# Patient Record
Sex: FEMALE | Race: WHITE | NOT HISPANIC OR LATINO | Employment: FULL TIME | URBAN - METROPOLITAN AREA
[De-identification: names, ages, dates, MRNs, and addresses within clinical notes are randomized per-mention and may not be internally consistent; named-entity substitution may affect disease eponyms.]

---

## 2017-01-11 ENCOUNTER — OFFICE VISIT (OUTPATIENT)
Dept: ORTHOPEDICS | Facility: CLINIC | Age: 42
End: 2017-01-11

## 2017-01-11 VITALS — HEIGHT: 70 IN | WEIGHT: 155 LBS | BODY MASS INDEX: 22.19 KG/M2

## 2017-01-11 DIAGNOSIS — M25.512 ACUTE PAIN OF LEFT SHOULDER: Primary | ICD-10-CM

## 2017-01-11 RX ORDER — TRAMADOL HYDROCHLORIDE 50 MG/1
50 TABLET ORAL EVERY 8 HOURS PRN
Qty: 20 TABLET | Refills: 0 | Status: SHIPPED | OUTPATIENT
Start: 2017-01-11 | End: 2017-01-31

## 2017-01-11 NOTE — PROGRESS NOTES
Subjective:   Renetta Lauren is a 41 year old female who is here for left shoulder pain since carrying son late last night felt pull in left shoulder.   Something pulled in left posterior shoulder over past 2 weeks.  3:30 a.m. Walking downstairs.  Hurts to move the arm posterior, burning at rest, took a naproxen this morning.  Helped enough for her to work.  Hurts to move the arm.  Iced this a.m.  Thought it would get better.  Mom is a doctor and thought she might have torn RC.  Son was sick and she was up a lot, children's overnight for breathing problems, 2 weeks later, whooping cough.  Out of day care for 1 month.  Left shoulder has been getting worse.  Pain killers at night.  Writing, feeling the hands are improving.  Worse if she grabs the OJ.  Taking 1 tramadol for a lot of pain.  Several nights, 1/2 tab at 2 a.m.  Wakes up and left shoulder is burning.    Background:   Date of injury: 1/11/17     PAST MEDICAL, SOCIAL, SURGICAL AND FAMILY HISTORY: She  has a past medical history of Depression (ages 18-20); Ulcer (H); UTI (lower urinary tract infection); Asthma; and Miscarriage (Nov 2011).  She  has past surgical history that includes APPENDECTOMY (1991) and Operative hysteroscopy with morcellator (N/A, 11/23/2015).  Her family history includes Coronary Artery Disease in her maternal grandfather; Family History Negative in her father and another family member; Hypertension in her mother; Other Cancer in her mother. There is no history of DIABETES, Hyperlipidemia, CEREBROVASCULAR DISEASE, Breast Cancer, Colon Cancer, Prostate Cancer, Depression, Anxiety Disorder, MENTAL ILLNESS, Substance Abuse, Anesthesia Reaction, Asthma, OSTEOPOROSIS, Genetic Disorder, Thyroid Disease, Obesity, or Unknown/Adopted.  She reports that she has never smoked. She has never used smokeless tobacco. She reports that she drinks alcohol. She reports that she does not use illicit drugs.    ALLERGIES: She is allergic to nkda.    CURRENT  "MEDICATIONS: She has a current medication list which includes the following prescription(s): tramadol, azithromycin, ibuprofen, fluoxetine, acetaminophen-codeine, naproxen sodium, diphenhydramine hcl, and fluticasone.     REVIEW OF SYSTEMS: 10 point review of systems is negative except as noted above.     Exam:   Ht 5' 10\" (1.778 m)  Wt 155 lb (70.308 kg)  BMI 22.24 kg/m2  LMP 11/21/2016           CONSTITUTIONIAL: healthy, alert, no distress and cooperative  HEAD: Normocephalic. No masses, lesions, tenderness or abnormalities  SKIN: no suspicious lesions or rashes  GAIT: normal  NEUROLOGIC: Non-focal, Normal muscle tone and strength, reflexes normal, sensation grossly normal.  PSYCHIATRIC: affect normal/bright and mentation appears normal.    MUSCULOSKELETAL: left shoulder RC    Palpation:  Non-tender SC joint, clavicle, AC joint, acromion, subacromial space, proximal bicep tendon and upper trapezius muscle   Range of Motion        Active:all normal, tenderness with arm going posterior, can do all motions        Passive: all normal, can do all motions, pain worsens if hurry movements  Strength: rotator cuff strength - painful infraspinatus, push off test  Special tests: Negative Neer's test, Negative Lyons, Negative Cross-Arm adduction         Assessment/Plan:   Pt is a 42 yo white female with PMhx of left wrist DeQuervain's tenosynovitis presenting with left shoulder pain  1. Left shoulder- early OA, tender infraspinatus, supraspinatus- PT recommended, MRI ordered  Will call with results  Refill on Tramadol #20 only  RTC 6 weeks  X-RAY INTERPRETATION:   X-Ray of the Left Shoulder: 3-view, ap, y, axillary ordered and interpreted in the office today was positive for IMPRESSION:   1. Early osteophytosis about the glenohumeral joint.  2. Prominent anterior osteophyte about the humeral head neck junction  "

## 2017-01-11 NOTE — MR AVS SNAPSHOT
After Visit Summary   1/11/2017    Renetta Lauren    MRN: [id number]           Patient Information     Date Of Birth          1975        Visit Information        Provider Department      1/11/2017 2:15 PM Orquidea Seaman MD [facility]        Today's Diagnoses     Acute pain of left shoulder    -  1        Follow-ups after your visit        Additional Services     PHYSICAL THERAPY REFERRAL (Internal)       Physical Therapy Referral                  Follow-up notes from your care team     Return in about 6 weeks (around 2/22/2017), or if symptoms worsen or fail to improve.      Future tests that were ordered for you today     Open Future Orders        Priority Expected Expires Ordered    MRI Upper extremity joint w/o contrast lt Routine  1/11/2018 1/11/2017            Who to contact     Please call your clinic at [phone] to:    Ask questions about your health    Make or cancel appointments    Discuss your medicines    Learn about your test results    Speak to your doctor   If you have compliments or concerns about an experience at your clinic, or if you wish to file a complaint, please contact [facility] Patient Relations at [phone] or email us at [email or web address]         Additional Information About Your Visit        MyChart Information     [employer] gives you secure access to your electronic health record. If you see a primary care provider, you can also send messages to your care team and make appointments. If you have questions, please call your primary care clinic.  If you do not have a primary care provider, please call [phone] and they will assist you.      [employer] is an electronic gateway that provides easy, online access to your medical records. With [employer], you can request a clinic appointment, read your test results, renew a prescription or communicate with your care team.     To access your existing  "account, please contact your [facility] or call [phone] for assistance.        Care EveryWhere ID     This is your Care EveryWhere ID. This could be used by other organizations to access your [place] medical records  [id number]        Your Vitals Were     Height BMI (Body Mass Index) Last Period             5' 10\" (1.778 m) 22.24 kg/m2 11/21/2016          Blood Pressure from Last 3 Encounters:   12/15/16 110/76   12/07/16 114/72   10/08/16 117/78    Weight from Last 3 Encounters:   01/11/17 155 lb (70.308 kg)   12/15/16 155 lb 7 oz (70.506 kg)   12/07/16 157 lb (71.215 kg)              We Performed the Following     PHYSICAL THERAPY REFERRAL (Internal)        Primary Care Provider Office Phone # Fax #    Renetta Kumar [phone]       [facility] [address] [address]        Thank you!     Thank you for choosing [facility]  for your care. Our goal is always to provide you with excellent care. Hearing back from our patients is one way we can continue to improve our services. Please take a few minutes to complete the written survey that you may receive in the mail after your visit with us. Thank you!             Your Updated Medication List - Protect others around you: Learn how to safely use, store and throw away your medicines at www.disposemymeds.org.          This list is accurate as of: 1/11/17  3:22 PM.  Always use your most recent med list.                   Brand Name Dispense Instructions for use    acetaminophen-codeine 300-30 MG per tablet    TYLENOL #3    20 tablet    Take 1-2 tablets by mouth Use as needed 1-2 times per month for migraine       azithromycin 250 MG tablet    ZITHROMAX    6 tablet    Two tablets first day, then one tablet daily for four days.       BENADRYL PO          FLUoxetine 10 MG capsule    PROzac    90 capsule    Take 1 capsule (10 mg) by mouth daily       fluticasone 50 " MCG/ACT spray    FLONASE    1 g    Spray 1-2 sprays into both nostrils nightly as needed for rhinitis or allergies       IBUPROFEN PO          NAPROXEN SODIUM PO          traMADol 50 MG tablet    ULTRAM    30 tablet    Take 1 tablet (50 mg) by mouth every 8 hours as needed for moderate pain

## 2017-01-11 NOTE — Clinical Note
1/11/2017      RE: Renetta Lauren  [address]  [place] [address]        Subjective:   Renetta Lauren is a 41 year old female who is here for left shoulder pain since carrying son late last night felt pull in left shoulder.   Something pulled in left posterior shoulder over past 2 weeks.  3:30 a.m. Walking downstairs.  Hurts to move the arm posterior, burning at rest, took a naproxen this morning.  Helped enough for her to work.  Hurts to move the arm.  Iced this a.m.  Thought it would get better.  Mom is a doctor and thought she might have torn RC.  Son was sick and she was up a lot, children's overnight for breathing problems, 2 weeks later, whooping cough.  Out of day care for 1 month.  Left shoulder has been getting worse.  Pain killers at night.  Writing, feeling the hands are improving.  Worse if she grabs the OJ.  Taking 1 tramadol for a lot of pain.  Several nights, 1/2 tab at 2 a.m.  Wakes up and left shoulder is burning.    Background:   Date of injury: 1/11/17     PAST MEDICAL, SOCIAL, SURGICAL AND FAMILY HISTORY: She  has a past medical history of Depression (ages 17-21); Ulcer (H); UTI (lower urinary tract infection); Asthma; and Miscarriage (Nov 2011).  She  has past surgical history that includes APPENDECTOMY (1991) and Operative hysteroscopy with morcellator (N/A, 11/23/2015).  Her family history includes Coronary Artery Disease in her maternal grandfather; Family History Negative in her father and another family member; Hypertension in her mother; Other Cancer in her mother. There is no history of DIABETES, Hyperlipidemia, CEREBROVASCULAR DISEASE, Breast Cancer, Colon Cancer, Prostate Cancer, Depression, Anxiety Disorder, MENTAL ILLNESS, Substance Abuse, Anesthesia Reaction, Asthma, OSTEOPOROSIS, Genetic Disorder, Thyroid Disease, Obesity, or Unknown/Adopted.  She reports that she has never smoked. She has never used smokeless tobacco. She reports that she drinks alcohol. She  "reports that she does not use illicit drugs.    ALLERGIES: She is allergic to nkda.    CURRENT MEDICATIONS: She has a current medication list which includes the following prescription(s): tramadol, azithromycin, ibuprofen, fluoxetine, acetaminophen-codeine, naproxen sodium, diphenhydramine hcl, and fluticasone.     REVIEW OF SYSTEMS: 10 point review of systems is negative except as noted above.     Exam:   Ht 5' 10\" (1.778 m)  Wt 155 lb (70.308 kg)  BMI 22.24 kg/m2  LMP 11/21/2016           CONSTITUTIONIAL: healthy, alert, no distress and cooperative  HEAD: Normocephalic. No masses, lesions, tenderness or abnormalities  SKIN: no suspicious lesions or rashes  GAIT: normal  NEUROLOGIC: Non-focal, Normal muscle tone and strength, reflexes normal, sensation grossly normal.  PSYCHIATRIC: affect normal/bright and mentation appears normal.    MUSCULOSKELETAL: left shoulder RC    Palpation:  Non-tender SC joint, clavicle, AC joint, acromion, subacromial space, proximal bicep tendon and upper trapezius muscle   Range of Motion        Active:all normal, tenderness with arm going posterior, can do all motions        Passive: all normal, can do all motions, pain worsens if hurry movements  Strength: rotator cuff strength - painful infraspinatus, push off test  Special tests: Negative Neer's test, Negative Lyons, Negative Cross-Arm adduction         Assessment/Plan:   Pt is a 40 yo white female with PMhx of left wrist DeQuervain's tenosynovitis presenting with left shoulder pain  1. Left shoulder- early OA, tender infraspinatus, supraspinatus- PT recommended, MRI ordered  Will call with results  Refill on Tramadol #20 only  RTC 6 weeks  X-RAY INTERPRETATION:   X-Ray of the Left Shoulder: 3-view, ap, y, axillary ordered and interpreted in the office today was positive for IMPRESSION:   1. Early osteophytosis about the glenohumeral joint.  2. Prominent anterior osteophyte about the humeral head neck junction    Orquidea" Eliza Seaman MD

## 2017-01-23 DIAGNOSIS — G43.009 MIGRAINE WITHOUT AURA AND WITHOUT STATUS MIGRAINOSUS, NOT INTRACTABLE: ICD-10-CM

## 2017-01-23 NOTE — TELEPHONE ENCOUNTER
SN  Controlled Substance Refill Request for Tylenol #3    Last refill: 11/30/2016 - #20    Last clinic visit: 12/15/2016     Clinic visit frequency required: Not documented  Next appt: No future OV scheduled    Controlled substance agreement on file: Yes:  Date 12/19/2016.    Documentation in problem list reviewed:  Documentation started. Needs to be completed by PCP    Processing:  Fax Rx to [address]    [place] reviewed:  reviewed- recommend provider review. Receives Tramdol from another provider ([facility])  [place]  [email or web address]  Please advise.  Thanks, Devika Canseco RN

## 2017-01-23 NOTE — TELEPHONE ENCOUNTER
acetaminophen-codeine (TYLENOL #3) 300-30 MG per tablet 20 tablet 0 12/15/2016  No      Sig: Take 1-2 tablets by mouth Use as needed 1-2 times per month for migraine     Class: Historical

## 2017-01-23 NOTE — TELEPHONE ENCOUNTER
Reason for Call:  Medication or medication refill:    Do you use a [facility]?  Name of the pharmacy and phone number for the current request:      Name of the medication requested: acetaminophen-codeine (TYLENOL #3) 300-30 MG per tablet    Other request:   Can we leave a detailed message on this number? YES    Phone number patient can be reached at: Home number on file [phone] (home)    Best Time:     Call taken on 1/23/2017 at 11:02 AM by Loreta Bill

## 2017-01-24 ENCOUNTER — THERAPY VISIT (OUTPATIENT)
Dept: PHYSICAL THERAPY | Facility: CLINIC | Age: 42
End: 2017-01-24
Payer: COMMERCIAL

## 2017-01-24 DIAGNOSIS — G89.29 CHRONIC LEFT SHOULDER PAIN: Primary | ICD-10-CM

## 2017-01-24 DIAGNOSIS — M25.512 CHRONIC LEFT SHOULDER PAIN: Primary | ICD-10-CM

## 2017-01-24 PROCEDURE — 97110 THERAPEUTIC EXERCISES: CPT | Mod: GP | Performed by: PHYSICAL THERAPIST

## 2017-01-24 PROCEDURE — 97161 PT EVAL LOW COMPLEX 20 MIN: CPT | Mod: GP | Performed by: PHYSICAL THERAPIST

## 2017-01-24 PROCEDURE — 97112 NEUROMUSCULAR REEDUCATION: CPT | Mod: GP | Performed by: PHYSICAL THERAPIST

## 2017-01-24 NOTE — PROGRESS NOTES
Subjective:    Renetta Lauren is a 41 year old female with a left shoulder condition.  Condition occurred with:  While carrying an object, repetition/overuse and lifting.  Condition occurred: at home.  This is a new and chronic condition  2016 - onset of L shoulder pain from carrying  son. Had previous R shoulder issues from carrying him on the right side, and switched to holding him on the left, which helped the right shoulder but caused the onset of the left shoulder. Then, on 17 - had 1 day of really intenst pain at night. Had been carrying son a lot more when he was sick for the month prior to that. Son is 14 months old.  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     Xray results: IMPRESSION:   1. Early osteophytosis about the glenohumeral joint.  2. Prominent anterior osteophyte about the humeral head neck junction.    Patient reports pain:  Posterior.  Radiates to:  Upper arm (occasionally down forearm into fingers).  Pain is described as aching and burning and is intermittent Pain Scale: 6-7/10 at the end of the day.  Associated with: denies N/T, no loss of motion or strength. Pain is worse in the P.M. (after doing all the activity).  Symptoms are exacerbated by carrying, lifting and other (waking up after lying on left side) and relieved by NSAID's, rest and analgesics.  Since onset symptoms are gradually improving.  Special tests:  X-ray (will have MRI in 3 days).  Previous treatment: none for this.    General health as reported by patient is excellent.                                            Objective:    Standing Alignment:      Shoulder/UE:  Rounded shoulders and superior fossa atrophy L                                       Shoulder Evaluation:  ROM:  AROM:    Flexion:  Left:  WNL    Right:  WNL    Abduction:  Left: WNL   Right:  WNL      External Rotation:  Left:  WNL, pain    Right:  WNL          Flexion/External Rotation:  Left:   T4    Right:  T4  Extension/Internal Rotation:  Left:  T7, pain    Right:  T7          Strength:  : scaption 5/5 bilat. SA: 5/5. Mid Trap: L 4/4, R 4+/5. Low Trap: L 4-/5, R 4/5.  Flexion: Left:5/5   Pain:    Right: 5/5     Pain:     Abduction:  Left: 5/5  Pain:    Right: 5/5     Pain:    Internal Rotation:  Left:5/5     Pain:    Right: 5/5     Pain:  External Rotation:   Left:5-/5     Pain:   Right:5-/5     Pain:                Special Tests:  Special tests assessed shoulder: -Empty can, -belly press, -lift off, -ER lag sign. +Lyons-Toni impingement, +coracoid impingement, -crossover impingement.  Left shoulder positive for the following special tests:  Impingement  Left shoulder negative for the following special tests:  Rotator cuff tear    Palpation:  Palpation assessed shoulder: -TTP at mm belly of supra and infra. TTP at tendon of supraspinatus.    Left shoulder tenderness not present at: Supraspinatus; Infraspinatus or Bicipital Groove                                       General     ROS    Assessment/Plan:      Patient is a 41 year old female with left side shoulder complaints.    Patient has the following significant findings with corresponding treatment plan.                Diagnosis 1:  Left Shoulder pain/RC Tendinopathy    Pain -  manual therapy, self management, education and home program  Decreased strength - therapeutic exercise, therapeutic activities and home program  Decreased function - therapeutic activities and home program  Impaired posture - neuro re-education, therapeutic activities and home program    Therapy Evaluation Codes:   1) History comprised of:   Personal factors that impact the plan of care:      Time since onset of symptoms and living environment of having to pickup and carry son.    Comorbidity factors that impact the plan of care are:      None.     Medications impacting care: None.  2) Examination of Body Systems comprised of:   Body structures and functions that impact  the plan of care:      Shoulder.   Activity limitations that impact the plan of care are:      Lifting and Reading/Computer work.  3) Clinical presentation characteristics are:   Stable/Uncomplicated.  4) Decision-Making    Low complexity using standardized patient assessment instrument and/or measureable assessment of functional outcome.  Cumulative Therapy Evaluation is: Low complexity.    Previous and current functional limitations:  (See Goal Flow Sheet for this information)    Short term and Long term goals: (See Goal Flow Sheet for this information)     Communication ability:  Patient appears to be able to clearly communicate and understand verbal and written communication and follow directions correctly.  Treatment Explanation - The following has been discussed with the patient:   RX ordered/plan of care  Anticipated outcomes  Possible risks and side effects  This patient would benefit from PT intervention to resume normal activities.   Rehab potential is good.    Frequency:  1 X week, once daily  Duration:  for 3 weeks tapering to 2 X a month over 6 weeks  Discharge Plan:  Achieve all LTG.  Independent in home treatment program.  Reach maximal therapeutic benefit.    Please refer to the daily flowsheet for treatment today, total treatment time and time spent performing 1:1 timed codes.

## 2017-01-25 NOTE — PROGRESS NOTES
Subjective:                     and reported as 2/10.                General health as reported by patient is excellent.  Pertinent medical history includes:  Migraines.    Surgical history: Appendectomy.  Current medications:  Pain medication and anti-inflammatory.  Current occupation is Writer.    Primary job tasks include:  Lifting and prolonged sitting (Computer work).                              Objective:    System    Physical Exam    General     ROS    Assessment/Plan:

## 2017-01-31 ENCOUNTER — MYC REFILL (OUTPATIENT)
Dept: ORTHOPEDICS | Facility: CLINIC | Age: 42
End: 2017-01-31

## 2017-01-31 DIAGNOSIS — M25.512 ACUTE PAIN OF LEFT SHOULDER: ICD-10-CM

## 2017-01-31 RX ORDER — TRAMADOL HYDROCHLORIDE 50 MG/1
50 TABLET ORAL EVERY 8 HOURS PRN
Qty: 20 TABLET | Refills: 0 | Status: SHIPPED | OUTPATIENT
Start: 2017-01-31 | End: 2017-02-16

## 2017-01-31 NOTE — TELEPHONE ENCOUNTER
Message from [employer]:  Original authorizing provider: Orquidea Seaman MD    Renetta LYNN Leonidas would like a refill of the following medications:  traMADol (ULTRAM) 50 MG tablet [Orquidea Seaman MD]    Preferred pharmacy: [facility]    Comment:

## 2017-02-08 ENCOUNTER — THERAPY VISIT (OUTPATIENT)
Dept: PHYSICAL THERAPY | Facility: CLINIC | Age: 42
End: 2017-02-08
Payer: COMMERCIAL

## 2017-02-08 DIAGNOSIS — M25.512 CHRONIC LEFT SHOULDER PAIN: Primary | ICD-10-CM

## 2017-02-08 DIAGNOSIS — G89.29 CHRONIC LEFT SHOULDER PAIN: Primary | ICD-10-CM

## 2017-02-08 PROCEDURE — 97110 THERAPEUTIC EXERCISES: CPT | Mod: GP | Performed by: PHYSICAL THERAPIST

## 2017-02-08 PROCEDURE — 97112 NEUROMUSCULAR REEDUCATION: CPT | Mod: GP | Performed by: PHYSICAL THERAPIST

## 2017-02-16 ENCOUNTER — MYC REFILL (OUTPATIENT)
Dept: ORTHOPEDICS | Facility: CLINIC | Age: 42
End: 2017-02-16

## 2017-02-16 DIAGNOSIS — M25.512 ACUTE PAIN OF LEFT SHOULDER: ICD-10-CM

## 2017-02-21 ENCOUNTER — TELEPHONE (OUTPATIENT)
Dept: ORTHOPEDICS | Facility: CLINIC | Age: 42
End: 2017-02-21

## 2017-02-21 RX ORDER — TRAMADOL HYDROCHLORIDE 50 MG/1
50 TABLET ORAL EVERY 8 HOURS PRN
Qty: 20 TABLET | Refills: 0 | Status: SHIPPED | OUTPATIENT
Start: 2017-02-21 | End: 2017-04-12

## 2017-02-21 NOTE — TELEPHONE ENCOUNTER
----- Message from Jeanette Bynum RN sent at 2/21/2017 12:24 PM CST -----  Regarding: Tramadol  Hi,  Pt called triage to say she would like to  her Tramadol Rx from the first floor locked box today.  brittney Reid

## 2017-02-21 NOTE — TELEPHONE ENCOUNTER
Requesting another script for Tramadol, will allow this time but we are not continuing, will need to have discussion with PMD regarding continuation.  If the wrist pain is not improving please make a follow-up appt.

## 2017-02-21 NOTE — TELEPHONE ENCOUNTER
Returned call to patient, to inform her that Dr. Seaman is working out of a different clinic today and that we wouldn't have the script until tomorrow when Dr. Seaman has clinic here at the [facility] [place]. She was okay with this and will  the script tomorrow (2/22/17) from the 1st floor lock box.

## 2017-02-21 NOTE — TELEPHONE ENCOUNTER
Left voice message stating the the tramadol was filled and Dr. Seaman currently has the script. Let patient know that she can come here to pick it up or we can mail it to her address. Also gave her the option letting her know if she is still having wrist pain to come in for a follow up appointment with Dr. Seaman. Gave patient number to contact us regarding those decisions.

## 2017-02-22 ENCOUNTER — TELEPHONE (OUTPATIENT)
Dept: ORTHOPEDICS | Facility: CLINIC | Age: 42
End: 2017-02-22

## 2017-03-01 ENCOUNTER — THERAPY VISIT (OUTPATIENT)
Dept: PHYSICAL THERAPY | Facility: CLINIC | Age: 42
End: 2017-03-01
Payer: COMMERCIAL

## 2017-03-01 DIAGNOSIS — M25.512 CHRONIC LEFT SHOULDER PAIN: ICD-10-CM

## 2017-03-01 DIAGNOSIS — G89.29 CHRONIC LEFT SHOULDER PAIN: ICD-10-CM

## 2017-03-01 PROCEDURE — 97110 THERAPEUTIC EXERCISES: CPT | Mod: GP | Performed by: PHYSICAL THERAPIST

## 2017-03-01 PROCEDURE — 97112 NEUROMUSCULAR REEDUCATION: CPT | Mod: GP | Performed by: PHYSICAL THERAPIST

## 2017-03-15 ENCOUNTER — OFFICE VISIT (OUTPATIENT)
Dept: ORTHOPEDICS | Facility: CLINIC | Age: 42
End: 2017-03-15

## 2017-03-15 VITALS — BODY MASS INDEX: 22.19 KG/M2 | WEIGHT: 155 LBS | HEIGHT: 70 IN

## 2017-03-15 DIAGNOSIS — M25.512 CHRONIC LEFT SHOULDER PAIN: ICD-10-CM

## 2017-03-15 DIAGNOSIS — M65.4 RADIAL STYLOID TENOSYNOVITIS: Primary | ICD-10-CM

## 2017-03-15 DIAGNOSIS — G89.29 CHRONIC LEFT SHOULDER PAIN: ICD-10-CM

## 2017-03-15 RX ORDER — TRAMADOL HYDROCHLORIDE 50 MG/1
50 TABLET ORAL EVERY 8 HOURS PRN
Qty: 30 TABLET | Refills: 0 | Status: SHIPPED | OUTPATIENT
Start: 2017-03-15 | End: 2017-05-25

## 2017-03-15 NOTE — MR AVS SNAPSHOT
After Visit Summary   3/15/2017    Renetta Lauren    MRN: [id number]           Patient Information     Date Of Birth          1975        Visit Information        Provider Department      3/15/2017 11:30 AM Orquidea Seaman MD [facility]        Today's Diagnoses     Radial styloid tenosynovitis    -  1    Chronic left shoulder pain           Follow-ups after your visit        Follow-up notes from your care team     Return in about 6 weeks (around 4/26/2017), or if symptoms worsen or fail to improve.      Your next 10 appointments already scheduled     Mar 23, 2017 12:50 PM CDT   ANA MARIA Extremity with Cat Pierson PT   [facility] [place] ([address] [address]                 Who to contact     Please call your clinic at [phone] to:    Ask questions about your health    Make or cancel appointments    Discuss your medicines    Learn about your test results    Speak to your doctor   If you have compliments or concerns about an experience at your clinic, or if you wish to file a complaint, please contact [facility] Patient Relations at [phone] or email us at [email or web address]         Additional Information About Your Visit        MyChart Information     [employer] gives you secure access to your electronic health record. If you see a primary care provider, you can also send messages to your care team and make appointments. If you have questions, please call your primary care clinic.  If you do not have a primary care provider, please call [phone] and they will assist you.      Hall is an electronic gateway that provides easy, online access to your medical records. With Hall, you can request a clinic appointment, read your test results, renew a prescription or communicate with your care team.     To access your existing account, please  "contact your [facility] or call [phone] for assistance.        Care EveryWhere ID     This is your Care EveryWhere ID. This could be used by other organizations to access your [facility] medical records  [id number]        Your Vitals Were     Height BMI (Body Mass Index)                5' 10\" (1.778 m) 22.24 kg/m2           Blood Pressure from Last 3 Encounters:   12/15/16 110/76   12/07/16 114/72   10/08/16 117/78    Weight from Last 3 Encounters:   03/15/17 155 lb (70.3 kg)   01/11/17 155 lb (70.3 kg)   12/15/16 155 lb 7 oz (70.5 kg)              Today, you had the following     No orders found for display         Today's Medication Changes          These changes are accurate as of: 3/15/17 12:16 PM.  If you have any questions, ask your nurse or doctor.               Stop taking these medicines if you haven't already. Please contact your care team if you have questions.     azithromycin 250 MG tablet   Commonly known as:  ZITHROMAX                Where to get your medicines      Some of these will need a paper prescription and others can be bought over the counter.  Ask your nurse if you have questions.     Bring a paper prescription for each of these medications     traMADol 50 MG tablet                Primary Care Provider Office Phone # Fax #    Renetta Kumar [phone]       [facility] [address] [address]        Thank you!     Thank you for choosing [facility]  for your care. Our goal is always to provide you with excellent care. Hearing back from our patients is one way we can continue to improve our services. Please take a few minutes to complete the written survey that you may receive in the mail after your visit with us. Thank you!             Your Updated Medication List - Protect others around you: Learn how to safely use, store and throw away your medicines at www.disposemymeds.org.        "   This list is accurate as of: 3/15/17 12:16 PM.  Always use your most recent med list.                   Brand Name Dispense Instructions for use    acetaminophen-codeine 300-30 MG per tablet    TYLENOL #3    20 tablet    Use 1-2 tablets daily as needed 1-2 times per month for migraine.  This is a 4 month supply       BENADRYL PO          FLUoxetine 10 MG capsule    PROzac    90 capsule    Take 1 capsule (10 mg) by mouth daily       fluticasone 50 MCG/ACT spray    FLONASE    1 g    Spray 1-2 sprays into both nostrils nightly as needed for rhinitis or allergies       IBUPROFEN PO          NAPROXEN SODIUM PO          * traMADol 50 MG tablet    ULTRAM    20 tablet    Take 1 tablet (50 mg) by mouth every 8 hours as needed for moderate pain       * traMADol 50 MG tablet    ULTRAM    30 tablet    Take 1 tablet (50 mg) by mouth every 8 hours as needed for moderate pain       * Notice:  This list has 2 medication(s) that are the same as other medications prescribed for you. Read the directions carefully, and ask your doctor or other care provider to review them with you.

## 2017-03-15 NOTE — LETTER
3/15/2017      RE: Renetta Lauren  [address]  [address]  [place] [address]        Subjective:   Renetta Lauren is a 41 year old female who is here for left shoulder pain.  Axillary tenderness is improving.  Working with PT.  RC- partial tear.  Worried about being in the scanner for over 5-10 min.  Son with Fever.  Cough that is keeping her up.  Up at night coughing.  Day Care germs.    Wrists have 2-3 days- advil, then 4 nights of daggers in her wrists.  The other night she sliced her fingers.  Daggers pain in left wrist, plate dropped and without thinking tried to catch the plate.  Bone spurs, thinking about elimination diet.  Carrying on occasion.  Now picking up and redirecting.  No wearing the braces, rubbing getting worse.  Left off the braces and they are fine.  Nervous about Tramadol, some dependence, nights with pain doesn't find advil effective.  Has taken for 8-10 days.  No withdrawal symptoms.  Parents are psychologists.  Needed the Tramadol 5 nights a week.  Son is 16 months now.    Background:   Date of injury: 1/11/17     PAST MEDICAL, SOCIAL, SURGICAL AND FAMILY HISTORY: She  has a past medical history of Asthma; Depression (ages 17-21); Miscarriage (Nov 2011); Ulcer (H); and UTI (lower urinary tract infection).  She  has a past surgical history that includes APPENDECTOMY (1991) and Operative hysteroscopy with morcellator (N/A, 11/23/2015).  Her family history includes Coronary Artery Disease in her maternal grandfather; Family History Negative in her father; Hypertension in her mother; Other Cancer in her mother. There is no history of DIABETES, Hyperlipidemia, CEREBROVASCULAR DISEASE, Breast Cancer, Colon Cancer, Prostate Cancer, Depression, Anxiety Disorder, MENTAL ILLNESS, Substance Abuse, Anesthesia Reaction, Asthma, OSTEOPOROSIS, Genetic Disorder, Thyroid Disease, Obesity, or Unknown/Adopted.  She reports that she has never smoked. She has never used smokeless tobacco. She reports  "that she drinks alcohol. She reports that she does not use illicit drugs.    ALLERGIES: She is allergic to nkda [no known drug allergies].    CURRENT MEDICATIONS: She has a current medication list which includes the following prescription(s): tramadol, acetaminophen-codeine, ibuprofen, fluoxetine, naproxen sodium, diphenhydramine hcl, and fluticasone.     REVIEW OF SYSTEMS: 10 point review of systems is negative except as noted above.     Exam:   Ht 5' 10\" (1.778 m)  Wt 155 lb (70.3 kg)  BMI 22.24 kg/m2           CONSTITUTIONIAL: healthy, alert, no distress and cooperative  HEAD: Normocephalic. No masses, lesions, tenderness or abnormalities  SKIN: no suspicious lesions or rashes  GAIT: normal  NEUROLOGIC: Non-focal, Normal muscle tone and strength, reflexes normal, sensation grossly normal.  PSYCHIATRIC: affect normal/bright and mentation appears normal.    MUSCULOSKELETAL: left Dequervain's, improved left shoulder pain with PT    WRIST:  Inspection: swelling - mild 1st wrist dorsal compartment  Palpation: Tender: 1st dorsal compartment  Non-tender: distal radius, distal ulna  Range of Motion: normal  Strength: no deficits  Special tests: negative Tinel's at carpal tunnel.  , negative Phalen's.  , negative Finkelstein's.      ELBOW:  elbow exam not done           Assessment/Plan:   Pt is a 40 yo white female with PMhx of left wrist DeQuervain's tenosynovitis presenting with left shoulder pain  1. Left shoulder- early OA, tender infraspinatus, supraspinatus- PT recommended  And it's improving so she's holding off on MRI  2. Left DeQuervain's- flares off and on, depending on demands for child, bracing as needed    Refill on Tramadol #30 only, has decreased overall need  RTC 6 weeks  X-RAY INTERPRETATION:   X-Ray of the Left Shoulder: 3-view, ap, y, axillary ordered and interpreted in the office today was positive for IMPRESSION:   1. Early osteophytosis about the glenohumeral joint.  2. Prominent anterior osteophyte " about the humeral head neck junction    Orquidea Seaman MD

## 2017-03-15 NOTE — PROGRESS NOTES
Subjective:   Renetta Lauren is a 41 year old female who is here for left shoulder pain.  Axillary tenderness is improving.  Working with PT.  RC- partial tear.  Worried about being in the scanner for over 5-10 min.  Son with Fever.  Cough that is keeping her up.  Up at night coughing.  Day Care germs.    Wrists have 2-3 days- advil, then 4 nights of daggers in her wrists.  The other night she sliced her fingers.  Daggers pain in left wrist, plate dropped and without thinking tried to catch the plate.  Bone spurs, thinking about elimination diet.  Carrying on occasion.  Now picking up and redirecting.  No wearing the braces, rubbing getting worse.  Left off the braces and they are fine.  Nervous about Tramadol, some dependence, nights with pain doesn't find advil effective.  Has taken for 8-10 days.  No withdrawal symptoms.  Parents are psychologists.  Needed the Tramadol 5 nights a week.  Son is 16 months now.    Background:   Date of injury: 1/11/17     PAST MEDICAL, SOCIAL, SURGICAL AND FAMILY HISTORY: She  has a past medical history of Asthma; Depression (ages 17-21); Miscarriage (Nov 2011); Ulcer (H); and UTI (lower urinary tract infection).  She  has a past surgical history that includes APPENDECTOMY (1991) and Operative hysteroscopy with morcellator (N/A, 11/23/2015).  Her family history includes Coronary Artery Disease in her maternal grandfather; Family History Negative in her father; Hypertension in her mother; Other Cancer in her mother. There is no history of DIABETES, Hyperlipidemia, CEREBROVASCULAR DISEASE, Breast Cancer, Colon Cancer, Prostate Cancer, Depression, Anxiety Disorder, MENTAL ILLNESS, Substance Abuse, Anesthesia Reaction, Asthma, OSTEOPOROSIS, Genetic Disorder, Thyroid Disease, Obesity, or Unknown/Adopted.  She reports that she has never smoked. She has never used smokeless tobacco. She reports that she drinks alcohol. She reports that she does not use illicit drugs.    ALLERGIES: She is  "allergic to nkda [no known drug allergies].    CURRENT MEDICATIONS: She has a current medication list which includes the following prescription(s): tramadol, acetaminophen-codeine, ibuprofen, fluoxetine, naproxen sodium, diphenhydramine hcl, and fluticasone.     REVIEW OF SYSTEMS: 10 point review of systems is negative except as noted above.     Exam:   Ht 5' 10\" (1.778 m)  Wt 155 lb (70.3 kg)  BMI 22.24 kg/m2           CONSTITUTIONIAL: healthy, alert, no distress and cooperative  HEAD: Normocephalic. No masses, lesions, tenderness or abnormalities  SKIN: no suspicious lesions or rashes  GAIT: normal  NEUROLOGIC: Non-focal, Normal muscle tone and strength, reflexes normal, sensation grossly normal.  PSYCHIATRIC: affect normal/bright and mentation appears normal.    MUSCULOSKELETAL: left Dequervain's, improved left shoulder pain with PT    WRIST:  Inspection: swelling - mild 1st wrist dorsal compartment  Palpation: Tender: 1st dorsal compartment  Non-tender: distal radius, distal ulna  Range of Motion: normal  Strength: no deficits  Special tests: negative Tinel's at carpal tunnel.  , negative Phalen's.  , negative Finkelstein's.      ELBOW:  elbow exam not done           Assessment/Plan:   Pt is a 40 yo white female with PMhx of left wrist DeQuervain's tenosynovitis presenting with left shoulder pain  1. Left shoulder- early OA, tender infraspinatus, supraspinatus- PT recommended  And it's improving so she's holding off on MRI  2. Left DeQuervain's- flares off and on, depending on demands for child, bracing as needed    Refill on Tramadol #30 only, has decreased overall need  RTC 6 weeks  X-RAY INTERPRETATION:   X-Ray of the Left Shoulder: 3-view, ap, y, axillary ordered and interpreted in the office today was positive for IMPRESSION:   1. Early osteophytosis about the glenohumeral joint.  2. Prominent anterior osteophyte about the humeral head neck junction  "

## 2017-03-27 ENCOUNTER — THERAPY VISIT (OUTPATIENT)
Dept: PHYSICAL THERAPY | Facility: CLINIC | Age: 42
End: 2017-03-27
Payer: COMMERCIAL

## 2017-03-27 DIAGNOSIS — G89.29 CHRONIC LEFT SHOULDER PAIN: ICD-10-CM

## 2017-03-27 DIAGNOSIS — M25.512 CHRONIC LEFT SHOULDER PAIN: ICD-10-CM

## 2017-03-27 PROCEDURE — 97112 NEUROMUSCULAR REEDUCATION: CPT | Mod: GP | Performed by: PHYSICAL THERAPIST

## 2017-03-27 PROCEDURE — 97110 THERAPEUTIC EXERCISES: CPT | Mod: GP | Performed by: PHYSICAL THERAPIST

## 2017-03-27 NOTE — MR AVS SNAPSHOT
After Visit Summary   3/27/2017    Renetta Lauren    MRN: [id number]           Patient Information     Date Of Birth          1975        Visit Information        Provider Department      3/27/2017 1:30 PM Cat Pierson PT [facility]        Today's Diagnoses     Chronic left shoulder pain           Follow-ups after your visit        Who to contact     If you have questions or need follow up information about today's clinic visit or your schedule please contact [facility] directly at No information on file..  Normal or non-critical lab and imaging results will be communicated to you by [employer], letter or phone within 4 business days after the clinic has received the results. If you do not hear from us within 7 days, please contact the clinic through [employer] or phone. If you have a critical or abnormal lab result, we will notify you by phone as soon as possible.  Submit refill requests through [employer] or call your pharmacy and they will forward the refill request to us. Please allow 3 business days for your refill to be completed.          Additional Information About Your Visit        MyChart Information     Z[employer] gives you secure access to your electronic health record. If you see a primary care provider, you can also send messages to your care team and make appointments. If you have questions, please call your primary care clinic.  If you do not have a primary care provider, please call [phone] and they will assist you.        Care EveryWhere ID     This is your Care EveryWhere ID. This could be used by other organizations to access your [place] medical records  [id number]         Blood Pressure from Last 3 Encounters:   12/15/16 110/76   12/07/16 114/72   10/08/16 117/78    Weight from Last 3 Encounters:   03/15/17 70.3 kg (155 lb)   01/11/17 70.3 kg (155 lb)   12/15/16 70.5 kg (155 lb 7 oz)              We Performed the  Following     NEUROMUSCULAR RE-EDUCATION     THERAPEUTIC EXERCISES        Primary Care Provider Office Phone # Fax #    Renetta Kumar [phone]       [facility] [address] [address]        Thank you!     Thank you for choosing [facility] [place]  for your care. Our goal is always to provide you with excellent care. Hearing back from our patients is one way we can continue to improve our services. Please take a few minutes to complete the written survey that you may receive in the mail after your visit with us. Thank you!             Your Updated Medication List - Protect others around you: Learn how to safely use, store and throw away your medicines at www.disposemymeds.org.          This list is accurate as of: 3/27/17  2:16 PM.  Always use your most recent med list.                   Brand Name Dispense Instructions for use    acetaminophen-codeine 300-30 MG per tablet    TYLENOL #3    20 tablet    Use 1-2 tablets daily as needed 1-2 times per month for migraine.  This is a 4 month supply       BENADRYL PO          FLUoxetine 10 MG capsule    PROzac    90 capsule    Take 1 capsule (10 mg) by mouth daily       fluticasone 50 MCG/ACT spray    FLONASE    1 g    Spray 1-2 sprays into both nostrils nightly as needed for rhinitis or allergies       IBUPROFEN PO          NAPROXEN SODIUM PO          * traMADol 50 MG tablet    ULTRAM    20 tablet    Take 1 tablet (50 mg) by mouth every 8 hours as needed for moderate pain       * traMADol 50 MG tablet    ULTRAM    30 tablet    Take 1 tablet (50 mg) by mouth every 8 hours as needed for moderate pain       * Notice:  This list has 2 medication(s) that are the same as other medications prescribed for you. Read the directions carefully, and ask your doctor or other care provider to review them with you.

## 2017-04-04 DIAGNOSIS — G43.009 MIGRAINE WITHOUT AURA AND WITHOUT STATUS MIGRAINOSUS, NOT INTRACTABLE: ICD-10-CM

## 2017-04-04 NOTE — TELEPHONE ENCOUNTER
Reason for call:  Patient reporting a symptom    Symptom or request: Migraine    Duration (how long have symptoms been present): 18 hours or so     Have you been treated for this before? Yes    Additional comments: Renetta LYNN Leonidas would like to speak with a nurse and see if she can get her migraine medication refilled.       Phone Number patient can be reached at:  Home number on file [phone] (home)    Best Time:  ASAP     Can we leave a detailed message on this number:  YES    Call taken on 4/4/2017 at 1:24 PM by Kassie Thompson

## 2017-04-04 NOTE — TELEPHONE ENCOUNTER
AS  Please address due to SN's absence.  Patient saw you 8/2016 for migraine  Patient states she has had a migraine since last evening.  States has vertigo with headache this time  and can not drive.  Has no one to drive her here.     Asking for refill of Tylenol #3.  Was given #20 pills 1/24/17.  Per SN notes (problem list)  #20 should last 4 months   States she ran out of the medication last week and usually will take when she feels migraine coming on.  Also has Tramadol on her medication list.  Last Rx 3/15/17 for #30 from another provider    Please advise.  Thanks, Devika Canseco, RN

## 2017-04-04 NOTE — TELEPHONE ENCOUNTER
AS  Routing back to you for Rx change to #10.  Will put [facility] report on your desk.  Thanks, Devika Canseco RN

## 2017-04-10 NOTE — TELEPHONE ENCOUNTER
Patient informed Rx for #10 will be faxed today.  Verbalizes understanding that she needs an OV for further refills.  Devika Canseco RN

## 2017-04-12 ENCOUNTER — TELEPHONE (OUTPATIENT)
Dept: ORTHOPEDICS | Facility: CLINIC | Age: 42
End: 2017-04-12

## 2017-04-12 DIAGNOSIS — M25.512 ACUTE PAIN OF LEFT SHOULDER: ICD-10-CM

## 2017-04-12 RX ORDER — TRAMADOL HYDROCHLORIDE 50 MG/1
50 TABLET ORAL EVERY 8 HOURS PRN
Qty: 20 TABLET | Refills: 0 | Status: SHIPPED | OUTPATIENT
Start: 2017-04-12 | End: 2017-05-03

## 2017-05-03 ENCOUNTER — TELEPHONE (OUTPATIENT)
Dept: ORTHOPEDICS | Facility: CLINIC | Age: 42
End: 2017-05-03

## 2017-05-03 DIAGNOSIS — M25.512 ACUTE PAIN OF LEFT SHOULDER: ICD-10-CM

## 2017-05-03 RX ORDER — TRAMADOL HYDROCHLORIDE 50 MG/1
50 TABLET ORAL EVERY 8 HOURS PRN
Qty: 20 TABLET | Refills: 0 | Status: SHIPPED | OUTPATIENT
Start: 2017-05-03 | End: 2017-05-17

## 2017-05-03 NOTE — TELEPHONE ENCOUNTER
Left voicemail for patient in regards to her Tramadol prescription request. Script is in Dr. Seaman's box in Sports Medicine clinic. Callback number was left.

## 2017-05-04 NOTE — TELEPHONE ENCOUNTER
Tried to call patient back. No answer so left a voice message stating that we can call the tramadol Rx into the pharmacy at [id number] - [place], [place] - [address] [place] [address] if she prefers. Gave call back number to let us know what her decision is.

## 2017-05-04 NOTE — TELEPHONE ENCOUNTER
----- Message from Khushboo Delaney RN sent at 5/3/2017  5:01 PM CDT -----  Regarding: call back/rx  [phone]  Please call her about her prescription.  She is returning Mattie's call

## 2017-05-08 DIAGNOSIS — G43.109 MIGRAINE WITH AURA AND WITHOUT STATUS MIGRAINOSUS, NOT INTRACTABLE: ICD-10-CM

## 2017-05-08 RX ORDER — FLUOXETINE 10 MG/1
CAPSULE ORAL
Start: 2017-05-08

## 2017-05-08 NOTE — TELEPHONE ENCOUNTER
Prozac      Last Written Prescription Date: 12/15/2016  Last Fill Quantity: 90, # refills: 3  Last Office Visit with G primary care provider:  12/15/2016        Last PHQ-9 score on record=   PHQ-9 SCORE 12/18/2015   Total Score 3

## 2017-05-17 ENCOUNTER — MYC REFILL (OUTPATIENT)
Dept: ORTHOPEDICS | Facility: CLINIC | Age: 42
End: 2017-05-17

## 2017-05-17 ENCOUNTER — MYC MEDICAL ADVICE (OUTPATIENT)
Dept: ORTHOPEDICS | Facility: CLINIC | Age: 42
End: 2017-05-17

## 2017-05-17 DIAGNOSIS — M25.512 ACUTE PAIN OF LEFT SHOULDER: ICD-10-CM

## 2017-05-18 RX ORDER — TRAMADOL HYDROCHLORIDE 50 MG/1
50 TABLET ORAL EVERY 8 HOURS PRN
Qty: 20 TABLET | Refills: 0
Start: 2017-05-18 | End: 2017-06-05

## 2017-05-22 ASSESSMENT — ENCOUNTER SYMPTOMS
MUSCLE WEAKNESS: 1
HEADACHES: 1
STIFFNESS: 1
ARTHRALGIAS: 1

## 2017-05-25 ENCOUNTER — OFFICE VISIT (OUTPATIENT)
Dept: ORTHOPEDICS | Facility: CLINIC | Age: 42
End: 2017-05-25

## 2017-05-25 VITALS
DIASTOLIC BLOOD PRESSURE: 81 MMHG | HEIGHT: 70 IN | WEIGHT: 160.3 LBS | HEART RATE: 52 BPM | RESPIRATION RATE: 18 BRPM | BODY MASS INDEX: 22.95 KG/M2 | SYSTOLIC BLOOD PRESSURE: 126 MMHG | OXYGEN SATURATION: 98 %

## 2017-05-25 DIAGNOSIS — M65.4 RADIAL STYLOID TENOSYNOVITIS OF RIGHT HAND: Primary | ICD-10-CM

## 2017-05-25 DIAGNOSIS — M25.531 RIGHT WRIST PAIN: Primary | ICD-10-CM

## 2017-05-25 NOTE — LETTER
5/25/2017       RE: Renetta Lauren  [address]  [address]  [place] [address]     Dear Colleague,    Thank you for referring your patient, Renetta Lauren, to the [facility] at [facility]. Please see a copy of my visit note below.    SUBJECTIVE: Renetta Lauren is a 41 year old female who reports right wrist pain.  Son on a walk and trying to park the stroller and foot was trapped into a grill grate.  Pt had pain last spring with DeQuervain's.  Has thumb spica brace at home.  Pt reports doesn't think it's fractured.  Irritation  Tramadol used about 1.5 tablets  Experiences migraines only around time of her period.    PAST MEDICAL, SOCIAL, SURGICAL AND FAMILY HISTORY: She  has a past medical history of Asthma; Depression (ages 18-21); Depressive disorder (ages 18-21); Miscarriage (Nov 2011); Ulcer (H); and UTI (lower urinary tract infection).  She  has a past surgical history that includes APPENDECTOMY (1991); Operative hysteroscopy with morcellator (N/A, 11/23/2015); and STOMACH SURGERY PROCEDURE UNLISTED (1990).  Her family history includes CANCER in her mother; Coronary Artery Disease in her maternal grandfather; Family History Negative in her father and another family member; Hypertension in her mother; Other Cancer in her mother. There is no history of DIABETES, Hyperlipidemia, CEREBROVASCULAR DISEASE, Breast Cancer, Colon Cancer, Prostate Cancer, Depression, Anxiety Disorder, MENTAL ILLNESS, Substance Abuse, Anesthesia Reaction, Asthma, OSTEOPOROSIS, Genetic Disorder, Thyroid Disease, Obesity, or Unknown/Adopted.  She reports that she has never smoked. She has never used smokeless tobacco. She reports that she drinks alcohol. She reports that she does not use illicit drugs.    ALLERGIES: She is allergic to nkda [no known drug allergies].    CURRENT MEDICATIONS: She has a current medication list which includes the following prescription(s): tramadol,  "acetaminophen-codeine, ibuprofen, fluoxetine, fluticasone, naproxen sodium, and diphenhydramine hcl.     REVIEW OF SYSTEMS: 10 point review of systems is negative except as noted above.    EXAM:  /81 (BP Location: Right arm, Patient Position: Chair, Cuff Size: Adult Regular)  Pulse 52  Resp 18  Ht 1.778 m (5' 10\")  Wt 72.7 kg (160 lb 4.8 oz)  SpO2 98%  BMI 23 kg/m2  CONSTITUTIONIAL: healthy, alert, no distress and cooperative  HEAD: Normocephalic. No masses, lesions, tenderness or abnormalities  ENT: ENT exam normal, no neck nodes or sinus tenderness  SKIN: no suspicious lesions or rashes  GAIT: normal  Stance: normal  NEUROLOGIC: Normal muscle tone and strength, reflexes normal, sensation grossly normal.  PSYCHIATRIC: affect normal/bright and mentation appears normal.    MUSCULOSKELETAL: right wrist pain  WRIST:  Inspection: no swelling  no effusion  Palpation: Tender: diffusely around wrist, distal radius, 1st dorsal compartment, extensor tendons  Non-tender: distal ulna, TFCC, ECU, scaphoid  Range of Motion: painful  Strength: no deficits  Special tests: negative Tinel's at carpal tunnel, negative Phalen's, positive Finkelstein's.      ELBOW:  elbow exam not done    ASSESSMENT/PLAN:  Pt is a 42 yo white female with PMHx of DeQuervain's presenting with right wrist pain after new FOOSH  1. Right wrist pain- deQuervain's exacerbated, Tramadol script filled previously, using ice and Ibuprofen, encouraged thumb spica brace  Pt met Ave, she'll call hand OT if needs brace adjustments, hand orders placed    RTC 3-4 weeks  X-RAY INTERPRETATION:   X-Ray of the Right Wrist: 3-view, ap, lateral, oblique  ordered and interpreted in the office today was negative for fracture, subluxation or joint space abnormality.    Again, thank you for allowing me to participate in the care of your patient.    Orquidea Seaman MD  "

## 2017-05-25 NOTE — NURSING NOTE
"Reason For Visit:   Chief Complaint   Patient presents with     Musculoskeletal Problem     continued right wrist pain with recent fall last week \"Tuesday or Wednesday\"       Primary MD: Renetta Kumar  Ref. MD: established patient    ?  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  Work status?  Full time.  Date of injury: most recent fall last week  Type of injury: tripped over grill.  Date of surgery: none  Smoker: No      /81 (BP Location: Right arm, Patient Position: Chair, Cuff Size: Adult Regular)  Pulse 52  Resp 18  Ht 1.778 m (5' 10\")  Wt 72.7 kg (160 lb 4.8 oz)  SpO2 98%  BMI 23 kg/m2    Pain Assessment  Patient Currently in Pain: Yes  0-10 Pain Scale: 2  Primary Pain Location: Wrist  Pain Orientation: Right  Pain Descriptors: Tightness, Pressure, Numbness  Alleviating Factors: Ice, Rest, Pain medication  Aggravating Factors: Other (comment) (lifting, use, worse at night)    "

## 2017-05-25 NOTE — MR AVS SNAPSHOT
After Visit Summary   5/25/2017    Renetta Lauren    MRN: [id number]           Patient Information     Date Of Birth          1975        Visit Information        Provider Department      5/25/2017 1:30 PM Orquidea Seaman MD [facility]        Today's Diagnoses     Radial styloid tenosynovitis of right hand    -  1       Follow-ups after your visit        Additional Services     HAND THERAPY       Hand Therapy Referral: meet briefly with Ave                  Follow-up notes from your care team     Return in about 4 weeks (around 6/22/2017), or if symptoms worsen or fail to improve.      Who to contact     Please call your clinic at [phone] to:    Ask questions about your health    Make or cancel appointments    Discuss your medicines    Learn about your test results    Speak to your doctor   If you have compliments or concerns about an experience at your clinic, or if you wish to file a complaint, please contact [facility] Patient Relations at [phone] or email us at [email or web address]         Additional Information About Your Visit        MyChart Information     [employer] gives you secure access to your electronic health record. If you see a primary care provider, you can also send messages to your care team and make appointments. If you have questions, please call your primary care clinic.  If you do not have a primary care provider, please call [phone] and they will assist you.      [employer] is an electronic gateway that provides easy, online access to your medical records. With [employer], you can request a clinic appointment, read your test results, renew a prescription or communicate with your care team.     To access your existing account, please contact your [facility] or call [phone] for assistance.        Care EveryWhere ID     This is your Care EveryWhere ID. This could be  "used by other organizations to access your [facility] medical records  [id number]        Your Vitals Were     Pulse Respirations Height Pulse Oximetry BMI (Body Mass Index)       52 18 1.778 m (5' 10\") 98% 23 kg/m2        Blood Pressure from Last 3 Encounters:   05/25/17 126/81   12/15/16 110/76   12/07/16 114/72    Weight from Last 3 Encounters:   05/25/17 72.7 kg (160 lb 4.8 oz)   03/15/17 70.3 kg (155 lb)   01/11/17 70.3 kg (155 lb)              We Performed the Following     HAND THERAPY        Primary Care Provider Office Phone # Fax #    Renetta Kumar [phone]       [facility] [address] [address]        Thank you!     Thank you for choosing [facility]  for your care. Our goal is always to provide you with excellent care. Hearing back from our patients is one way we can continue to improve our services. Please take a few minutes to complete the written survey that you may receive in the mail after your visit with us. Thank you!             Your Updated Medication List - Protect others around you: Learn how to safely use, store and throw away your medicines at www.disposemymeds.org.          This list is accurate as of: 5/25/17  2:58 PM.  Always use your most recent med list.                   Brand Name Dispense Instructions for use    acetaminophen-codeine 300-30 MG per tablet    TYLENOL #3    10 tablet    Use 1-2 tablets daily as needed 1-2 times per month for migraine.  This is a 4 month supply       BENADRYL PO          FLUoxetine 10 MG capsule    PROzac    90 capsule    Take 1 capsule (10 mg) by mouth daily       fluticasone 50 MCG/ACT spray    FLONASE    1 g    Spray 1-2 sprays into both nostrils nightly as needed for rhinitis or allergies       IBUPROFEN PO          NAPROXEN SODIUM PO          traMADol 50 MG tablet    ULTRAM    20 tablet    Take 1 tablet (50 mg) by mouth every 8 hours as needed for moderate pain         "

## 2017-05-25 NOTE — PROGRESS NOTES
SUBJECTIVE: Renetta Lauren is a 41 year old female who reports right wrist pain.  Son on a walk and trying to park the stroller and foot was trapped into a grill grate.  Pt had pain last spring with DeQuervain's.  Has thumb spica brace at home.  Pt reports doesn't think it's fractured.  Irritation  Tramadol used about 1.5 tablets  Experiences migraines only around time of her period.    PAST MEDICAL, SOCIAL, SURGICAL AND FAMILY HISTORY: She  has a past medical history of Asthma; Depression (ages 18-21); Depressive disorder (ages 18-21); Miscarriage (Nov 2011); Ulcer (H); and UTI (lower urinary tract infection).  She  has a past surgical history that includes APPENDECTOMY (1991); Operative hysteroscopy with morcellator (N/A, 11/23/2015); and STOMACH SURGERY PROCEDURE UNLISTED (1990).  Her family history includes CANCER in her mother; Coronary Artery Disease in her maternal grandfather; Family History Negative in her father and another family member; Hypertension in her mother; Other Cancer in her mother. There is no history of DIABETES, Hyperlipidemia, CEREBROVASCULAR DISEASE, Breast Cancer, Colon Cancer, Prostate Cancer, Depression, Anxiety Disorder, MENTAL ILLNESS, Substance Abuse, Anesthesia Reaction, Asthma, OSTEOPOROSIS, Genetic Disorder, Thyroid Disease, Obesity, or Unknown/Adopted.  She reports that she has never smoked. She has never used smokeless tobacco. She reports that she drinks alcohol. She reports that she does not use illicit drugs.      ALLERGIES: She is allergic to nkda [no known drug allergies].    CURRENT MEDICATIONS: She has a current medication list which includes the following prescription(s): tramadol, acetaminophen-codeine, ibuprofen, fluoxetine, fluticasone, naproxen sodium, and diphenhydramine hcl.     REVIEW OF SYSTEMS: 10 point review of systems is negative except as noted above.    EXAM:  /81 (BP Location: Right arm, Patient Position: Chair, Cuff Size: Adult Regular)  Pulse 52   "Resp 18  Ht 1.778 m (5' 10\")  Wt 72.7 kg (160 lb 4.8 oz)  SpO2 98%  BMI 23 kg/m2  CONSTITUTIONIAL: healthy, alert, no distress and cooperative  HEAD: Normocephalic. No masses, lesions, tenderness or abnormalities  ENT: ENT exam normal, no neck nodes or sinus tenderness  SKIN: no suspicious lesions or rashes  GAIT: normal  Stance: normal  NEUROLOGIC: Normal muscle tone and strength, reflexes normal, sensation grossly normal.  PSYCHIATRIC: affect normal/bright and mentation appears normal.    MUSCULOSKELETAL: right wrist pain  WRIST:  Inspection: no swelling  no effusion  Palpation: Tender: diffusely around wrist, distal radius, 1st dorsal compartment, extensor tendons  Non-tender: distal ulna, TFCC, ECU, scaphoid  Range of Motion: painful  Strength: no deficits  Special tests: negative Tinel's at carpal tunnel, negative Phalen's, positive Finkelstein's.      ELBOW:  elbow exam not done        ASSESSMENT/PLAN:  Pt is a 42 yo white female with PMHx of DeQuervain's presenting with right wrist pain after new FOOSH  1. Right wrist pain- deQuervain's exacerbated, Tramadol script filled previously, using ice and Ibuprofen, encouraged thumb spica brace  Pt met Ave, she'll call hand OT if needs brace adjustments, hand orders placed    RTC 3-4 weeks  X-RAY INTERPRETATION:   X-Ray of the Right Wrist: 3-view, ap, lateral, oblique  ordered and interpreted in the office today was negative for fracture, subluxation or joint space abnormality.  Answers for HPI/ROS submitted by the patient on 5/22/2017   General Symptoms: No  Skin Symptoms: No  HENT Symptoms: No  EYE SYMPTOMS: No  HEART SYMPTOMS: No  LUNG SYMPTOMS: No  INTESTINAL SYMPTOMS: No  URINARY SYMPTOMS: No  GYNECOLOGIC SYMPTOMS: No  BREAST SYMPTOMS: No  SKELETAL SYMPTOMS: Yes  BLOOD SYMPTOMS: No  NERVOUS SYSTEM SYMPTOMS: Yes  MENTAL HEALTH SYMPTOMS: No  Joint pain: Yes  Bone pain: Yes  Muscle weakness: Yes  Joint stiffness: Yes  Bone fracture: No  Headache: Yes    "

## 2017-06-05 DIAGNOSIS — M25.512 ACUTE PAIN OF LEFT SHOULDER: ICD-10-CM

## 2017-06-06 RX ORDER — TRAMADOL HYDROCHLORIDE 50 MG/1
50 TABLET ORAL EVERY 8 HOURS PRN
Qty: 20 TABLET | Refills: 0 | Status: SHIPPED | OUTPATIENT
Start: 2017-06-06 | End: 2017-06-22

## 2017-06-07 NOTE — TELEPHONE ENCOUNTER
Called patient to inform her that her script for Tramadol was ready and she requested that it be called in to her [facility] pharmacy on [facility]. She was informed that a nurse would be calling her to confirm once it's been done. She was okay with this plan.

## 2017-06-16 ENCOUNTER — THERAPY VISIT (OUTPATIENT)
Dept: OCCUPATIONAL THERAPY | Facility: CLINIC | Age: 42
End: 2017-06-16
Payer: COMMERCIAL

## 2017-06-16 DIAGNOSIS — M25.531 RIGHT WRIST PAIN: Primary | ICD-10-CM

## 2017-06-16 DIAGNOSIS — M65.4 RADIAL STYLOID TENOSYNOVITIS OF RIGHT HAND: ICD-10-CM

## 2017-06-16 PROCEDURE — 29125 APPL SHORT ARM SPLINT STATIC: CPT | Mod: GO | Performed by: OCCUPATIONAL THERAPIST

## 2017-06-16 PROCEDURE — 97110 THERAPEUTIC EXERCISES: CPT | Mod: GO | Performed by: OCCUPATIONAL THERAPIST

## 2017-06-16 PROCEDURE — 97165 OT EVAL LOW COMPLEX 30 MIN: CPT | Mod: GO | Performed by: OCCUPATIONAL THERAPIST

## 2017-06-16 NOTE — PROGRESS NOTES
Hand Therapy Initial Evaluation  Current Date:  6/16/2017    Subjective:  Reentta Lauren is a 41 year old right hand dominant female.    Diagnosis:  Right DeQuervain's  DOI:  MD order 5/25/17  Post:  > 1 year since initial onset of symptoms    Patient reports symptoms of pain, stiffness and swelling of the right wrist which occurred due to above diagnosis. Patient reports tripping and exacerbating things recently. Since onset symptoms are unchanged.  Special tests:  Xray.  Previous treatment: hand therapy, cortisone injection.    General health as reported by patient is excellent.  Pertinent medical history includes:  depression (past), migraines/headaches, concussions.  Medical allergies: none.  Surgical history: appendectomy.  Medication history: pain, anti-depressants    Current occupation is Writer/Teacher  Currently working in normal job without restrictions  Job Tasks:  Prolonged sitting, lifting/carrying, computer work  Barriers include:  none  Prior functional level:  independent     Red flags:  none    Additional Occupational Profile Information (patterns of daily living, interests, values and needs): Patient has a 1.5 year old son, has pain with lifting/transferring out of crib and pack n play. Lives alone.    Functional Outcome Measure:   Upper Extremity Functional Index Score:  SCORE:   Column Totals: /80: 65   (A lower score indicates greater disability.)    Objective:    Pain Report:  VAS(0-10) 6/16/17   At Rest: 3/10   With Use: 8/10   Location:  R Radial Wrist  Description:  Stabbing pain, burning at rest  Frequency:  Constant and intermittent    Pain is worse:  During the day with use  Pain is exacerbated by:  Picking up son, opening jars, opening windows  Pain is relieved by:  Rest, ice, splint, tramadol  Progression since onset:  unchanged    Functional Exam:  - no pain, + mild, ++ moderate, +++ severe  ROM:  Wrist 6/16/17 6/16/17   AROM(PROM) Left right   Extension 60 75   Flexion 90 82   RD 15  16   UD 40 35+   UD with th flex 20 25+      and Pinch Strength (pounds)  6/16 Date: 6/16   Left    Side  Right   49        # 1                # 2                # 3         Average 42   13  3 Point  # 1               # 2               # 3        Average 9   13  Lateral  # 1                # 2                # 3         Average 15+     MMT:  Date 6/16/17    Right   Thumb EPL     Thumb EPB     Thumb APL    Radial Deviation     Ulnar Deviation     Wrist Ext    Wrist Flex       Palpation:   6/16/17   Radial Styloid  +   1st dorsal comp.  +     Special Tests:     6/16/17   Finkelstein   ++   Radial Nerve Tinels Neg, however patient reports increased sensory disturbance in dorsal hand a few weeks ago     Appearance: Swelling/nodule noted on radial wrist over 1st DC that is tender to palpation    Assessment:  Patient presents with symptoms consistent with diagnosis of DeQuervains tendonitis, with conservative intervention.   Patient's limitations or Problem List includes:  Pain, Decreased ROM/motion, Weakness, Decreased  and pinch strength and tightness in musculature of the right wrist which interferes with the patient's ability to perform Self Care Tasks (dressing, eating, bathing, hygiene/toileting), Work Tasks, Sleep Patterns, Recreational Activities, Household Chores and Driving  as compared to previous level of function.    Rehab Potential:  Excellent - Return to full activity, no limitations    Patient will benefit from skilled Occupational Therapy to increase wrist and thumb ROM, flexibility,  strength and pinch strength and decrease pain to return to previous activity level and resume normal daily tasks and to reach their rehab potential.    Barriers to Learning:  No barrier    Communication Issues:  Patient appears to be able to clearly communicate and understand verbal and written communication and follow directions correctly.    Assessment of Occupational Performance:  5 or more Performance  Deficits  Identified Performance Deficits: bathing/showering, hygiene and grooming, care of others, child rearing, home establishment and management, meal preparation and cleanup and sleep      Clinical Decision Making (Complexity): Low complexity    Treatment Explanation:  The following has been discussed with the patient:    RX ordered/plan of care  Anticipated outcomes  Possible risks and side effects    Plan:  Frequency:  1 X week, once daily  Duration:  for 6 weeks    Treatment Plan:    Modalities:    US   Therapeutic Exercise:   AROM of wrist and thumb  PROM with stretch to wrist and thumb extensors   Manual Techniques:   Friction massage over 1st dorsal compartment  Myofascial release of the thumb extensors and flexors  Neuromuscular:   Radial nerve gliding   Kinesiotaping  Orthotics:    Forearm based thumb spica orthosis  Self Care:   Ergonomic consideration   Diagnostic education  Discharge Plan:    Achieve all LTG.  Independent in home treatment program.  Reach maximal therapeutic benefit.    Home Program:   Warmth for stiffness  Ice after activity for pain  Self TFM to 1st dorsal compartment  Self MFR to EPB/ABL  AROM of wrist and thumb  FABTSS  Avoid activities that exacerbate pain in the wrist or thumb    Next Visit:  Check splint  US  FM/MFR  Add Dequervnnamdi's stretch and RN glides  K-tape

## 2017-06-16 NOTE — MR AVS SNAPSHOT
After Visit Summary   6/16/2017    Renetta Lauren    MRN: [id number]           Patient Information     Date Of Birth          1975        Visit Information        Provider Department      6/16/2017 1:30 PM Amelia Barksdale OT [facility] Hand Therapy        Today's Diagnoses     Right wrist pain    -  1    Radial styloid tenosynovitis of right hand           Follow-ups after your visit        Who to contact     If you have questions or need follow up information about today's clinic visit or your schedule please contact [facility] directly at [phone].  Normal or non-critical lab and imaging results will be communicated to you by [email or web address], letter or phone within 4 business days after the clinic has received the results. If you do not hear from us within 7 days, please contact the clinic through [employer] or phone. If you have a critical or abnormal lab result, we will notify you by phone as soon as possible.  Submit refill requests through [employer] or call your pharmacy and they will forward the refill request to us. Please allow 3 business days for your refill to be completed.          Additional Information About Your Visit        MyChart Information     [employer] gives you secure access to your electronic health record. If you see a primary care provider, you can also send messages to your care team and make appointments. If you have questions, please call your primary care clinic.  If you do not have a primary care provider, please call [phone] and they will assist you.        Care EveryWhere ID     This is your Care EveryWhere ID. This could be used by other organizations to access your [facility] medical records  [id number]         Blood Pressure from Last 3 Encounters:   05/25/17 126/81   12/15/16 110/76   12/07/16 114/72    Weight from Last 3 Encounters:   05/25/17 72.7 kg (160 lb 4.8 oz)   03/15/17 70.3 kg (155 lb)   01/11/17 70.3 kg (155 lb)              We Performed the  Following     APPLY FOREARM SPLINT,STATIC     OT Eval, Low Complexity (19696)     THERAPEUTIC EXERCISES        Primary Care Provider Office Phone # Fax #    Renetta Kumar [phone]       [facility] [address]   [facility] [address]        Thank you!     Thank you for choosing [facility]  for your care. Our goal is always to provide you with excellent care. Hearing back from our patients is one way we can continue to improve our services. Please take a few minutes to complete the written survey that you may receive in the mail after your visit with us. Thank you!             Your Updated Medication List - Protect others around you: Learn how to safely use, store and throw away your medicines at www.disposemymeds.org.          This list is accurate as of: 6/16/17  3:29 PM.  Always use your most recent med list.                   Brand Name Dispense Instructions for use    acetaminophen-codeine 300-30 MG per tablet    TYLENOL #3    10 tablet    Use 1-2 tablets daily as needed 1-2 times per month for migraine.  This is a 4 month supply       BENADRYL PO          FLUoxetine 10 MG capsule    PROzac    90 capsule    Take 1 capsule (10 mg) by mouth daily       fluticasone 50 MCG/ACT spray    FLONASE    1 g    Spray 1-2 sprays into both nostrils nightly as needed for rhinitis or allergies       IBUPROFEN PO          NAPROXEN SODIUM PO          traMADol 50 MG tablet    ULTRAM    20 tablet    Take 1 tablet (50 mg) by mouth every 8 hours as needed for moderate pain

## 2017-06-22 ENCOUNTER — MYC REFILL (OUTPATIENT)
Dept: ORTHOPEDICS | Facility: CLINIC | Age: 42
End: 2017-06-22

## 2017-06-22 DIAGNOSIS — M25.512 ACUTE PAIN OF LEFT SHOULDER: ICD-10-CM

## 2017-06-22 NOTE — TELEPHONE ENCOUNTER
Message from [employer]:  Original authorizing provider: Orquidea Seaman MD    Renetta LYNNSrinivas Lauren would like a refill of the following medications:  traMADol (ULTRAM) 50 MG tablet [Orquidea Seaman MD]    Preferred pharmacy: [facility]/PH[facility]ACY #[id number] [address]    Comment:  Hi Dr. Seaman, I'm including a note in my refill request, because I know we discussed trying to focus on getting a new brace, icing and ibuprofen--all fine with me, and the new brace is more comfortable. However, although I took a break from meds to make sure that I wasn't having any dependence symptoms (fortunately, all fine on that front) I am still really struggling with pain management in evenings  wrist is visibly more inflamed than it has ever been, and I'm not sure why. Thank you, Renetta

## 2017-06-27 RX ORDER — TRAMADOL HYDROCHLORIDE 50 MG/1
50 TABLET ORAL EVERY 8 HOURS PRN
Qty: 20 TABLET | Refills: 0 | Status: SHIPPED | OUTPATIENT
Start: 2017-06-27 | End: 2017-07-12

## 2017-07-12 ENCOUNTER — MYC REFILL (OUTPATIENT)
Dept: ORTHOPEDICS | Facility: CLINIC | Age: 42
End: 2017-07-12

## 2017-07-12 DIAGNOSIS — M25.512 ACUTE PAIN OF LEFT SHOULDER: ICD-10-CM

## 2017-07-14 PROBLEM — G89.29 CHRONIC LEFT SHOULDER PAIN: Status: RESOLVED | Noted: 2017-01-24 | Resolved: 2017-07-14

## 2017-07-14 PROBLEM — M25.512 CHRONIC LEFT SHOULDER PAIN: Status: RESOLVED | Noted: 2017-01-24 | Resolved: 2017-07-14

## 2017-07-14 NOTE — PROGRESS NOTES
Patient did not return to Physical Therapy to complete their plan of care, thus, full DC status is unknown. Please refer to the SOAP note dated 3/27/17 as well as subjective and objective reports copied below from the last visit for discharge information.   Subjective: Saw Dr. Seaman 2 weeks ago. reports posterior shoulder pain is nicely improved. but her wrists are worsened, and has felt more weakness. Pain is more manageable during the day, and the shoulder is not as bothered with lfting. lifting is more bothersome to wrists.  nights are the worst for the shoulder and when she has the most pain. can even be 7/10 at night and needs pain meds. during the day 2/10 pain is the most  Objective: L scapular winging/increased medial scap border prominence during dynamic hug  Their bout of care ranged from 1/24/17 to 3/27/17. Discharge patient from PT at this time.

## 2017-07-18 NOTE — TELEPHONE ENCOUNTER
Message from [employer]:  Original authorizing provider: MD Renetta Connolly would like a refill of the following medications:  traMADol (ULTRAM) 50 MG tablet [Orquidea Seaman MD]    Preferred pharmacy: [facility]/PHARMACY #[id number] [address]    Comment:  Helkarlene Seaman, I'm going to make an appointment for hand therapy on my right hand, but was wondering if I could also renew the order for therapy on the left as well? (Ultrasound, etc.--that was somewhat helpful at pain relief for a few days.) I have been religiously wearing the RH brace but my left wrist/thumb pain is starting to act up again and both sides continue to wake me up at night with moderate pain so I think I'll try the ultrasound treatments again.

## 2017-07-25 RX ORDER — TRAMADOL HYDROCHLORIDE 50 MG/1
50 TABLET ORAL EVERY 8 HOURS PRN
Qty: 20 TABLET | Refills: 0 | Status: SHIPPED | OUTPATIENT
Start: 2017-07-25 | End: 2017-12-07

## 2017-07-26 ENCOUNTER — TELEPHONE (OUTPATIENT)
Dept: ORTHOPEDICS | Facility: CLINIC | Age: 42
End: 2017-07-26

## 2017-08-10 ENCOUNTER — OFFICE VISIT (OUTPATIENT)
Dept: ORTHOPEDICS | Facility: CLINIC | Age: 42
End: 2017-08-10

## 2017-08-10 VITALS
OXYGEN SATURATION: 99 % | TEMPERATURE: 98.8 F | HEIGHT: 70 IN | SYSTOLIC BLOOD PRESSURE: 123 MMHG | HEART RATE: 67 BPM | BODY MASS INDEX: 22.9 KG/M2 | DIASTOLIC BLOOD PRESSURE: 75 MMHG | WEIGHT: 160 LBS

## 2017-08-10 DIAGNOSIS — M25.531 RIGHT WRIST PAIN: Primary | ICD-10-CM

## 2017-08-10 DIAGNOSIS — M25.532 LEFT WRIST PAIN: ICD-10-CM

## 2017-08-10 RX ORDER — TRAMADOL HYDROCHLORIDE 50 MG/1
50 TABLET ORAL EVERY 6 HOURS PRN
Qty: 30 TABLET | Refills: 3 | Status: SHIPPED | OUTPATIENT
Start: 2017-08-10 | End: 2017-10-25

## 2017-08-10 NOTE — MR AVS SNAPSHOT
After Visit Summary   8/10/2017    Renetta Lauren    MRN: [id number]           Patient Information     Date Of Birth          1975        Visit Information        Provider Department      8/10/2017 1:45 PM Orquidea Seaman MD [facility]        Today's Diagnoses     Right wrist pain    -  1    Left wrist pain           Follow-ups after your visit        Additional Services     HAND THERAPY       Hand Therapy Referral: symptoms of DeQuervain's on left wrist            ORTHOPEDICS ADULT REFERRAL       Your provider has referred you to: PREFERRED PROVIDERS: hand- right wrist DeQuervain's    Please be aware that coverage of these services is subject to the terms and limitations of your health insurance plan.  Call member services at your health plan with any benefit or coverage questions.      Please bring the following to your appointment:    >>   Any x-rays, CTs or MRIs which have been performed.  Contact the facility where they were done to arrange for  prior to your scheduled appointment.    >>   List of current medications   >>   This referral request   >>   Any documents/labs given to you for this referral                  Follow-up notes from your care team     Return in about 4 weeks (around 9/7/2017), or if symptoms worsen or fail to improve.      Your next 10 appointments already scheduled     Aug 16, 2017  9:00 AM CDT   ANA MARIA Hand with Amelia Barksdale OT   [facility] ([facility])    [address] [place] [address] [phone]              Who to contact     Please call your clinic at [phone] to:    Ask questions about your health    Make or cancel appointments    Discuss your medicines    Learn about your test results    Speak to your doctor   If you have compliments or concerns about an experience at your clinic, or if you wish to file a complaint, please contact [facility]  "[place] [facility] Patient Relations at [phone] or email us at [email or web address]         Additional Information About Your Visit        [employer] Information     [employer] gives you secure access to your electronic health record. If you see a primary care provider, you can also send messages to your care team and make appointments. If you have questions, please call your primary care clinic.  If you do not have a primary care provider, please call [phone] and they will assist you.      [employer] is an electronic gateway that provides easy, online access to your medical records. With [employer], you can request a clinic appointment, read your test results, renew a prescription or communicate with your care team.     To access your existing account, please contact your [facility] or call [phone] for assistance.        Care EveryWhere ID     This is your Care EveryWhere ID. This could be used by other organizations to access your [place] medical records  [id number]        Your Vitals Were     Pulse Temperature Height Pulse Oximetry BMI (Body Mass Index)       67 98.8  F (37.1  C) (Oral) 1.778 m (5' 10\") 99% 22.96 kg/m2        Blood Pressure from Last 3 Encounters:   08/10/17 123/75   05/25/17 126/81   12/15/16 110/76    Weight from Last 3 Encounters:   08/10/17 72.6 kg (160 lb)   05/25/17 72.7 kg (160 lb 4.8 oz)   03/15/17 70.3 kg (155 lb)              We Performed the Following     HAND THERAPY     ORTHOPEDICS ADULT REFERRAL          Today's Medication Changes          These changes are accurate as of: 8/10/17  4:13 PM.  If you have any questions, ask your nurse or doctor.               These medicines have changed or have updated prescriptions.        Dose/Directions    * traMADol 50 MG tablet   Commonly known as:  ULTRAM   This may have changed:  Another medication with the same name was added. Make sure you understand how and when to take each.   Used for:  " Acute pain of left shoulder   Changed by:  Orquidea Seaman MD        Dose:  50 mg   Take 1 tablet (50 mg) by mouth every 8 hours as needed for moderate pain   Quantity:  20 tablet   Refills:  0       * traMADol 50 MG tablet   Commonly known as:  ULTRAM   This may have changed:  You were already taking a medication with the same name, and this prescription was added. Make sure you understand how and when to take each.   Used for:  Right wrist pain   Changed by:  Orquidea Seaman MD        Dose:  50 mg   Take 1 tablet (50 mg) by mouth every 6 hours as needed for moderate pain   Quantity:  30 tablet   Refills:  3       * Notice:  This list has 2 medication(s) that are the same as other medications prescribed for you. Read the directions carefully, and ask your doctor or other care provider to review them with you.         Where to get your medicines      Some of these will need a paper prescription and others can be bought over the counter.  Ask your nurse if you have questions.     Bring a paper prescription for each of these medications     traMADol 50 MG tablet                Primary Care Provider Office Phone # Fax #    [place]Mai Kumar [phone] [address] [address]        Equal Access to Services     ANNETTE BACON : Joel Suarez, waperryda moise, qaybta kaalmada adegerardo, erickson martinez. So [facility] [phone].    ATENCIÓN: Si habla español, tiene a george disposición servicios gratuitos de asistencia lingüística. Llame al [phone].    We comply with applicable federal civil rights laws and [place] laws. We do not discriminate on the basis of race, color, national origin, age, disability sex, sexual orientation or gender identity.            Thank you!     Thank you for choosing [facility]  for your care. Our goal is always to provide you with excellent care. Hearing back  from our patients is one way we can continue to improve our services. Please take a few minutes to complete the written survey that you may receive in the mail after your visit with us. Thank you!             Your Updated Medication List - Protect others around you: Learn how to safely use, store and throw away your medicines at www.disposemymeds.org.          This list is accurate as of: 8/10/17  4:13 PM.  Always use your most recent med list.                   Brand Name Dispense Instructions for use Diagnosis    BENADRYL PO           FLUoxetine 10 MG capsule    PROzac    90 capsule    Take 1 capsule (10 mg) by mouth daily    Migraine with aura and without status migrainosus, not intractable       fluticasone 50 MCG/ACT spray    FLONASE    1 g    Spray 1-2 sprays into both nostrils nightly as needed for rhinitis or allergies    Post-nasal drip       IBUPROFEN PO           * traMADol 50 MG tablet    ULTRAM    20 tablet    Take 1 tablet (50 mg) by mouth every 8 hours as needed for moderate pain    Acute pain of left shoulder       * traMADol 50 MG tablet    ULTRAM    30 tablet    Take 1 tablet (50 mg) by mouth every 6 hours as needed for moderate pain    Right wrist pain       * Notice:  This list has 2 medication(s) that are the same as other medications prescribed for you. Read the directions carefully, and ask your doctor or other care provider to review them with you.

## 2017-08-10 NOTE — NURSING NOTE
"Reason For Visit:   Chief Complaint   Patient presents with     RECHECK     Pt. states that she is here today for follow up injury of the right wrist. DOI: 05/2017. She mention that the last 10 days her pain had increase.        Pain Assessment  Patient Currently in Pain: Yes  0-10 Pain Scale: 4  Primary Pain Location: Wrist  Pain Orientation: Right  Pain Descriptors: Discomfort  Alleviating Factors: NSAIDS, Pain medication, Rest  Aggravating Factors: Movement, Reaching, Exercise, Stretching               HEIGHT: 5' 10\", WEIGHT: 160 lbs 0 oz, BMI: Body mass index is 22.96 kg/(m^2).      Current Outpatient Prescriptions   Medication Sig Dispense Refill     traMADol (ULTRAM) 50 MG tablet Take 1 tablet (50 mg) by mouth every 8 hours as needed for moderate pain 20 tablet 0     IBUPROFEN PO        FLUoxetine (PROZAC) 10 MG capsule Take 1 capsule (10 mg) by mouth daily 90 capsule 3     DiphenhydrAMINE HCl (BENADRYL PO)        fluticasone (FLONASE) 50 MCG/ACT nasal spray Spray 1-2 sprays into both nostrils nightly as needed for rhinitis or allergies 1 g 3          Allergies   Allergen Reactions     Nkda [No Known Drug Allergies]                "

## 2017-08-10 NOTE — LETTER
8/10/2017       RE: Renetta Lauren  [address]    [facility] [address]     Dear Colleague,    Thank you for referring your patient, Renetta Lauren, to the [facility] at [facility]. Please see a copy of my visit note below.    SUBJECTIVE: Renetta Lauren is a 42 year old female who reports that her wrist brace melted.  Has an appt on Wed.  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  Sons day care closed, 10 days, carrying son through the woods.  Part of the reason it's flared.  Makes notes on wrist.  10 days without need for Advil.  Ice and 400mg Advil, Tramadol.    Pain is so bad, not getting better.  More hand therapy appts, worried it's not getting better.  Tramadol worn off, taking minimum at night.  Now pain up to flexors, tingling, burning sensation.  Not her fingers, bottom of hand up to right elbow.    Pain during the day, using the brace and tensing the arm more.    Wrist is weak, cake was heavy, right wrist is weak.  Carrying son on left side.  Tumeric BID- midwife suggested it.    PAST MEDICAL, SOCIAL, SURGICAL AND FAMILY HISTORY: She  has a past medical history of Asthma; Depression (ages 18-21); Depressive disorder (ages 18-21); Miscarriage (Nov 2011); Ulcer (H); and UTI (lower urinary tract infection).  She  has a past surgical history that includes APPENDECTOMY (1991); Operative hysteroscopy with morcellator (N/A, 11/23/2015); and STOMACH SURGERY PROCEDURE UNLISTED (1990).  Her family history includes CANCER in her mother; Coronary Artery Disease in her maternal grandfather; Family History Negative in her father and another family member; Hypertension in her mother; Other Cancer in her mother. There is no history of DIABETES, Hyperlipidemia, CEREBROVASCULAR DISEASE, Breast Cancer, Colon Cancer, Prostate Cancer, Depression, Anxiety Disorder, MENTAL ILLNESS, Substance Abuse, Anesthesia Reaction, Asthma, OSTEOPOROSIS,  "Genetic Disorder, Thyroid Disease, Obesity, or Unknown/Adopted.  She reports that she has never smoked. She has never used smokeless tobacco. She reports that she drinks alcohol. She reports that she does not use illicit drugs.    ALLERGIES: She is allergic to nkda [no known drug allergies].    CURRENT MEDICATIONS: She has a current medication list which includes the following prescription(s): tramadol, ibuprofen, fluoxetine, diphenhydramine hcl, and fluticasone.     REVIEW OF SYSTEMS: 10 point review of systems is negative except as noted above.    EXAM:  /75  Pulse 67  Temp 98.8  F (37.1  C) (Oral)  Ht 1.778 m (5' 10\")  Wt 72.6 kg (160 lb)  SpO2 99%  BMI 22.96 kg/m2  CONSTITUTIONIAL: healthy, alert, no distress and cooperative  HEAD: Normocephalic. No masses, lesions, tenderness or abnormalities  ENT: ENT exam normal, no neck nodes or sinus tenderness  SKIN: no suspicious lesions or rashes  GAIT: normal  Stance: normal  NEUROLOGIC: Normal muscle tone and strength, reflexes normal, sensation grossly normal.  PSYCHIATRIC: affect normal/bright and mentation appears normal.    MUSCULOSKELETAL: DeQuervain's  WRIST:  Inspection: no swelling  no effusion  Palpation: Tender: 1st dorsal compartment  Non-tender: distal radius, distal ulna, scaphoid  Range of Motion: normal  Strength: no deficits  Special tests: positive Finkelstein's.      ELBOW:  elbow exam : Inspection: no swelling, no ecchymosis, no olecranon bursa swelling  Tender: no elbow tenderness  Non-tender: common extensor tendon and common flexor tendon  Range of Motion: all normal  Strength: elbow strength full  Special tests: no pain with resisted wrist extension, no pain with resisted wrist flexion:     ASSESSMENT/PLAN:  Pt is a 41 yo white female with PMHx of migraines presenting with bilateral deQuervain's tenosynovitis  1. Right DeQuervain's tenosynovitis- braced, hand therapy, past injection, over 1 year and not improving, wants surgery " opinion  2. Left DeQuervain's symptoms- how the right side started, pt has referral for hand therapy  RTC 4 weeks  X-RAY INTERPRETATION:   None today    Orquidea Seaman MD

## 2017-08-10 NOTE — PROGRESS NOTES
SUBJECTIVE: Renetta Lauren is a 42 year old female who reports that her wrist brace melted.  Has an appt on Wed.  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  Sons day care closed, 10 days, carrying son through the woods.  Part of the reason it's flared.  Makes notes on wrist.  10 days without need for Advil.  Ice and 400mg Advil, Tramadol.    Pain is so bad, not getting better.  More hand therapy appts, worried it's not getting better.  Tramadol worn off, taking minimum at night.  Now pain up to flexors, tingling, burning sensation.  Not her fingers, bottom of hand up to right elbow.    Pain during the day, using the brace and tensing the arm more.    Wrist is weak, cake was heavy, right wrist is weak.  Carrying son on left side.  Tumeric BID- midwife suggested it.    PAST MEDICAL, SOCIAL, SURGICAL AND FAMILY HISTORY: She  has a past medical history of Asthma; Depression (ages 18-21); Depressive disorder (ages 18-21); Miscarriage (Nov 2011); Ulcer (H); and UTI (lower urinary tract infection).  She  has a past surgical history that includes APPENDECTOMY (1991); Operative hysteroscopy with morcellator (N/A, 11/23/2015); and STOMACH SURGERY PROCEDURE UNLISTED (1990).  Her family history includes CANCER in her mother; Coronary Artery Disease in her maternal grandfather; Family History Negative in her father and another family member; Hypertension in her mother; Other Cancer in her mother. There is no history of DIABETES, Hyperlipidemia, CEREBROVASCULAR DISEASE, Breast Cancer, Colon Cancer, Prostate Cancer, Depression, Anxiety Disorder, MENTAL ILLNESS, Substance Abuse, Anesthesia Reaction, Asthma, OSTEOPOROSIS, Genetic Disorder, Thyroid Disease, Obesity, or Unknown/Adopted.  She reports that she has never smoked. She has never used smokeless tobacco. She reports that she drinks alcohol. She reports that she does not use illicit drugs.        ALLERGIES: She is allergic to nkda [no known drug  "allergies].    CURRENT MEDICATIONS: She has a current medication list which includes the following prescription(s): tramadol, ibuprofen, fluoxetine, diphenhydramine hcl, and fluticasone.     REVIEW OF SYSTEMS: 10 point review of systems is negative except as noted above.    EXAM:  /75  Pulse 67  Temp 98.8  F (37.1  C) (Oral)  Ht 1.778 m (5' 10\")  Wt 72.6 kg (160 lb)  SpO2 99%  BMI 22.96 kg/m2  CONSTITUTIONIAL: healthy, alert, no distress and cooperative  HEAD: Normocephalic. No masses, lesions, tenderness or abnormalities  ENT: ENT exam normal, no neck nodes or sinus tenderness  SKIN: no suspicious lesions or rashes  GAIT: normal  Stance: normal  NEUROLOGIC: Normal muscle tone and strength, reflexes normal, sensation grossly normal.  PSYCHIATRIC: affect normal/bright and mentation appears normal.    MUSCULOSKELETAL: DeQuervain's  WRIST:  Inspection: no swelling  no effusion  Palpation: Tender: 1st dorsal compartment  Non-tender: distal radius, distal ulna, scaphoid  Range of Motion: normal  Strength: no deficits  Special tests: positive Finkelstein's.      ELBOW:  elbow exam : Inspection: no swelling, no ecchymosis, no olecranon bursa swelling  Tender: no elbow tenderness  Non-tender: common extensor tendon and common flexor tendon  Range of Motion: all normal  Strength: elbow strength full  Special tests: no pain with resisted wrist extension, no pain with resisted wrist flexion:         ASSESSMENT/PLAN:  Pt is a 41 yo white female with PMHx of migraines presenting with bilateral deQuervain's tenosynovitis  1. Right DeQuervain's tenosynovitis- braced, hand therapy, past injection, over 1 year and not improving, wants surgery opinion  2. Left DeQuervain's symptoms- how the right side started, pt has referral for hand therapy  RTC 4 weeks  X-RAY INTERPRETATION:   None today  Answers for HPI/ROS submitted by the patient on 8/10/2017   General Symptoms: No  Skin Symptoms: No  HENT Symptoms: No  EYE " SYMPTOMS: No  HEART SYMPTOMS: No  LUNG SYMPTOMS: No  INTESTINAL SYMPTOMS: No  URINARY SYMPTOMS: No  GYNECOLOGIC SYMPTOMS: No  BREAST SYMPTOMS: No  SKELETAL SYMPTOMS: No  BLOOD SYMPTOMS: No  NERVOUS SYSTEM SYMPTOMS: No  MENTAL HEALTH SYMPTOMS: No

## 2017-08-16 ENCOUNTER — THERAPY VISIT (OUTPATIENT)
Dept: OCCUPATIONAL THERAPY | Facility: CLINIC | Age: 42
End: 2017-08-16
Payer: COMMERCIAL

## 2017-08-16 DIAGNOSIS — M65.4 RADIAL STYLOID TENOSYNOVITIS OF RIGHT HAND: ICD-10-CM

## 2017-08-16 DIAGNOSIS — M25.531 RIGHT WRIST PAIN: ICD-10-CM

## 2017-08-16 PROCEDURE — 97112 NEUROMUSCULAR REEDUCATION: CPT | Mod: GO | Performed by: OCCUPATIONAL THERAPIST

## 2017-08-16 PROCEDURE — 97035 APP MDLTY 1+ULTRASOUND EA 15: CPT | Mod: GO | Performed by: OCCUPATIONAL THERAPIST

## 2017-08-16 PROCEDURE — 97140 MANUAL THERAPY 1/> REGIONS: CPT | Mod: GO | Performed by: OCCUPATIONAL THERAPIST

## 2017-08-16 NOTE — MR AVS SNAPSHOT
After Visit Summary   8/16/2017    Renetta Lauren    MRN: [id number]           Patient Information     Date Of Birth          1975        Visit Information        Provider Department      8/16/2017 9:00 AM Amelia Barksdale OT [facility]        Today's Diagnoses     Right wrist pain        Radial styloid tenosynovitis of right hand           Follow-ups after your visit        Your next 10 appointments already scheduled     Aug 23, 2017  9:00 AM CDT   ANA MARIA Hand with [facility] ([facility])    [address] [place] [address]  4th [facility] [address] [phone]              Who to contact     If you have questions or need follow up information about today's clinic visit or your schedule please contact [facility] directly at [phone].  Normal or non-critical lab and imaging results will be communicated to you by [employer], letter or phone within 4 business days after the clinic has received the results. If you do not hear from us within 7 days, please contact the clinic through [employer] or phone. If you have a critical or abnormal lab result, we will notify you by phone as soon as possible.  Submit refill requests through [employer] or call your pharmacy and they will forward the refill request to us. Please allow 3 business days for your refill to be completed.          Additional Information About Your Visit        MyChart Information     [employer] gives you secure access to your electronic health record. If you see a primary care provider, you can also send messages to your care team and make appointments. If you have questions, please call your primary care clinic.  If you do not have a primary care provider, please call [phone] and they will assist you.        Care EveryWhere ID     This is your Care EveryWhere ID. This could be used by other organizations to access your [facility]  records  [id number]         Blood Pressure from Last 3 Encounters:   08/10/17 123/75   05/25/17 126/81   12/15/16 110/76    Weight from Last 3 Encounters:   08/10/17 72.6 kg (160 lb)   05/25/17 72.7 kg (160 lb 4.8 oz)   03/15/17 70.3 kg (155 lb)              We Performed the Following     MANUAL THER TECH     NEUROMUSCULAR RE-EDUCATION     ULTRASOUND THERAPY        Primary Care Provider Office Phone # Fax #    Renetta Kumar [phone] [address] [place] [address]        Equal Access to Services     [facility] HospitalSTANTON : Hadii hebert Suarez, aquilino phipps, cinthia laramamaru douglass, erickson morse . So [facility] [phone].    ATENCIÓN: Si habla español, tiene a george disposición servicios gratuitos de asistencia lingüística. Llame al [phone].    We comply with applicable federal civil rights laws and [place] laws. We do not discriminate on the basis of race, color, national origin, age, disability sex, sexual orientation or gender identity.            Thank you!     Thank you for choosing [facility]  for your care. Our goal is always to provide you with excellent care. Hearing back from our patients is one way we can continue to improve our services. Please take a few minutes to complete the written survey that you may receive in the mail after your visit with us. Thank you!             Your Updated Medication List - Protect others around you: Learn how to safely use, store and throw away your medicines at www.disposemymeds.org.          This list is accurate as of: 8/16/17 11:01 AM.  Always use your most recent med list.                   Brand Name Dispense Instructions for use Diagnosis    BENADRYL PO           FLUoxetine 10 MG capsule    PROzac    90 capsule    Take 1 capsule (10 mg) by mouth daily    Migraine with aura and without status migrainosus, not intractable       fluticasone 50 MCG/ACT spray    FLONASE    1 g     Spray 1-2 sprays into both nostrils nightly as needed for rhinitis or allergies    Post-nasal drip       IBUPROFEN PO           * traMADol 50 MG tablet    ULTRAM    20 tablet    Take 1 tablet (50 mg) by mouth every 8 hours as needed for moderate pain    Acute pain of left shoulder       * traMADol 50 MG tablet    ULTRAM    30 tablet    Take 1 tablet (50 mg) by mouth every 6 hours as needed for moderate pain    Right wrist pain       * Notice:  This list has 2 medication(s) that are the same as other medications prescribed for you. Read the directions carefully, and ask your doctor or other care provider to review them with you.

## 2017-08-16 NOTE — PROGRESS NOTES
Hand Therapy Progress Note  Reporting Period is 6/16/2017 through 8/16/2017    Diagnosis:  Right DeQuervain's   DOI:  MD order 5/25/17  Post:  > 1 year since initial onset of symptoms    Initial History:  Patient reports symptoms of pain, stiffness and swelling of the right wrist which occurred due to above diagnosis. Patient reports tripping and exacerbating things recently. Since onset symptoms are unchanged.  Special tests:  Xray.  Previous treatment: hand therapy, cortisone injection.    General health as reported by patient is excellent.  Pertinent medical history includes:  depression (past), migraines/headaches, concussions.  Medical allergies: none.  Surgical history: appendectomy.  Medication history: pain, anti-depressants    Current occupation is Writer/Teacher  Currently working in normal job without restrictions  Job Tasks:  Prolonged sitting, lifting/carrying, computer work  Barriers include:  none  Prior functional level:  independent     Red flags:  none    Additional Occupational Profile Information (patterns of daily living, interests, values and needs): Patient has a 1.5 year old son, has pain with lifting/transferring out of crib and pack n play. Lives alone.    S:  Subjective changes as noted by patient: the wrist has still been really painful. I have a surgery consult scheduled next month just to discuss my options. I left my splint in the car and some of it deformed/changed shape.   Functional changes noted by patient: No Change to Self Care Tasks (dressing, eating, bathing, hygiene/toileting) and Household Chores  Response to previous treatment: good  Patient has noted adverse reaction to: None    Objective:    Pain Report:  VAS(0-10) 6/16/17 8/16/17   At Rest: 3/10 R: 3/10   With Use: 8/10 R:6-7/10   Location:  R Radial Wrist, now also over dorsal wrist  Description:  Aching and tingling, burning with use  Frequency:  Constant and intermittent    Pain is worse:  During the day with use  Pain  is exacerbated by:  Picking up son, opening jars, opening windows  Pain is relieved by:  Rest, ice, splint, tramadol  Progression since onset:  unchanged    Functional Exam:  - no pain, + mild, ++ moderate, +++ severe  ROM:  Wrist 6/16/17 6/16/17 8/16/17   AROM(PROM) Left right right   Extension 60 75 73   Flexion 90 82 85   RD 15 16 15   UD 40 35+ 20++   UD with th flex 20 25+ 20++      and Pinch Strength (pounds)  6/16 Date: 6/16 8/16   Left    Side  Right Right   49        # 1                # 2                # 3         Average 42 54   13  3 Point  # 1               # 2               # 3        Average 9 10   13  Lateral  # 1                # 2                # 3         Average 15+ 13     MMT:  Date 8/16/17    Right   Thumb EPL  ++   Thumb EPB  +   Thumb APL ++   Radial Deviation  +   Ulnar Deviation  +   Wrist Ext +   Wrist Flex  -     Palpation:   6/16/17 8/16/17   Radial Styloid  + R: -   1st dorsal comp.  + R: -     Special Tests:     6/16/17 8/16/17   Finkelstein   ++ R: ++     Radial Nerve Tinels Neg, however patient reports increased sensory disturbance in dorsal hand a few weeks ago R: neg, painful over DRSN with tinels       Appearance: Swelling/nodule noted on radial wrist over 1st DC that is tender to palpation     Assessment:  Response to therapy has been lack of progress in:  Pain:  Ongoing pain in R wrist. Patient reports that splint became deformed in the heat and has not been able to wear it for 2 weeks which has exacerbated pain.   Overall Assessment:  Patient would benefit from continued therapy to achieve rehab potential  STG/LTG:  See goal sheet for details and updates of remaining functional limitations.     Plan:  I have re-evaluated this patient and find that the nature, scope, duration and intensity of the therapy is appropriate for the medical condition of the patient.  Frequency:  1 X week, once daily  Duration:  for 6 additional weeks     Home Program:   Warmth for  stiffness  Ice after activity for pain  Self TFM to 1st dorsal compartment  Self MFR to EPB/ABL  AROM of wrist and thumb  FABTSS  Avoid activities that exacerbate pain in the wrist or thumb  K-tape to Radial wrist  RN glides    Next Visit:  Fix splint  US  FM/MFR  Review RN glides   Add DeQuervnnamdi's stretch  K-tape

## 2017-08-30 ENCOUNTER — THERAPY VISIT (OUTPATIENT)
Dept: OCCUPATIONAL THERAPY | Facility: CLINIC | Age: 42
End: 2017-08-30
Payer: COMMERCIAL

## 2017-08-30 DIAGNOSIS — M65.4 RADIAL STYLOID TENOSYNOVITIS OF RIGHT HAND: ICD-10-CM

## 2017-08-30 DIAGNOSIS — M25.531 RIGHT WRIST PAIN: ICD-10-CM

## 2017-08-30 PROCEDURE — 97110 THERAPEUTIC EXERCISES: CPT | Mod: GO | Performed by: OCCUPATIONAL THERAPIST

## 2017-08-30 PROCEDURE — 97140 MANUAL THERAPY 1/> REGIONS: CPT | Mod: GO | Performed by: OCCUPATIONAL THERAPIST

## 2017-08-30 PROCEDURE — 97112 NEUROMUSCULAR REEDUCATION: CPT | Mod: GO | Performed by: OCCUPATIONAL THERAPIST

## 2017-09-13 ENCOUNTER — THERAPY VISIT (OUTPATIENT)
Dept: OCCUPATIONAL THERAPY | Facility: CLINIC | Age: 42
End: 2017-09-13
Payer: COMMERCIAL

## 2017-09-13 DIAGNOSIS — M25.531 RIGHT WRIST PAIN: ICD-10-CM

## 2017-09-13 DIAGNOSIS — M65.4 RADIAL STYLOID TENOSYNOVITIS OF RIGHT HAND: ICD-10-CM

## 2017-09-13 PROCEDURE — 29125 APPL SHORT ARM SPLINT STATIC: CPT | Mod: GO | Performed by: OCCUPATIONAL THERAPIST

## 2017-09-13 PROCEDURE — 97140 MANUAL THERAPY 1/> REGIONS: CPT | Mod: GO | Performed by: OCCUPATIONAL THERAPIST

## 2017-09-13 PROCEDURE — 97035 APP MDLTY 1+ULTRASOUND EA 15: CPT | Mod: GO | Performed by: OCCUPATIONAL THERAPIST

## 2017-09-25 ENCOUNTER — ALLIED HEALTH/NURSE VISIT (OUTPATIENT)
Dept: NURSING | Facility: CLINIC | Age: 42
End: 2017-09-25
Payer: COMMERCIAL

## 2017-09-25 ENCOUNTER — PRE VISIT (OUTPATIENT)
Dept: ORTHOPEDICS | Facility: CLINIC | Age: 42
End: 2017-09-25

## 2017-09-25 DIAGNOSIS — Z23 NEED FOR PROPHYLACTIC VACCINATION AND INOCULATION AGAINST INFLUENZA: Primary | ICD-10-CM

## 2017-09-25 PROCEDURE — 90686 IIV4 VACC NO PRSV 0.5 ML IM: CPT | Mod: SL

## 2017-09-25 PROCEDURE — 99207 ZZC NO CHARGE NURSE ONLY: CPT

## 2017-09-25 PROCEDURE — 90471 IMMUNIZATION ADMIN: CPT

## 2017-09-25 NOTE — PROGRESS NOTES
Injectable Influenza Immunization Documentation    1.  Is the person to be vaccinated sick today?   No    2. Does the person to be vaccinated have an allergy to a component   of the vaccine?   No    3. Has the person to be vaccinated ever had a serious reaction   to influenza vaccine in the past?   No    4. Has the person to be vaccinated ever had Guillain-Barré syndrome?   No    Form completed by self

## 2017-09-25 NOTE — MR AVS SNAPSHOT
After Visit Summary   9/25/2017    Renetta Lauren    MRN: [id number]           Patient Information     Date Of Birth          1975        Visit Information        Provider Department      9/25/2017 9:55 AM CARE COORDINATOR [facility]        Today's Diagnoses     Need for prophylactic vaccination and inoculation against influenza    -  1       Follow-ups after your visit        Your next 10 appointments already scheduled     Oct 02, 2017 10:30 AM CDT   (Arrive by 10:15 AM)   NEW HAND with Brian Meyers MD   [facility] ([facility])    [address] [place] [address]  4th [facility] [address] [phone]              Who to contact     If you have questions or need follow up information about today's clinic visit or your schedule please contact [facility] directly at [phone].  Normal or non-critical lab and imaging results will be communicated to you by Mobile Multimediahart, letter or phone within 4 business days after the clinic has received the results. If you do not hear from us within 7 days, please contact the clinic through Mobile Multimediahart or phone. If you have a critical or abnormal lab result, we will notify you by phone as soon as possible.  Submit refill requests through Skift or call your pharmacy and they will forward the refill request to us. Please allow 3 business days for your refill to be completed.          Additional Information About Your Visit        MyChart Information     Skift gives you secure access to your electronic health record. If you see a primary care provider, you can also send messages to your care team and make appointments. If you have questions, please call your primary care clinic.  If you do not have a primary care provider, please call [phone] and they will assist you.        Care EveryWhere ID     This is your Care EveryWhere ID. This could be used by  other organizations to access your [place] medical records  [id number]         Blood Pressure from Last 3 Encounters:   08/10/17 123/75   05/25/17 126/81   12/15/16 110/76    Weight from Last 3 Encounters:   08/10/17 160 lb (72.6 kg)   05/25/17 160 lb 4.8 oz (72.7 kg)   03/15/17 155 lb (70.3 kg)              We Performed the Following     FLU VAC, SPLIT VIRUS IM > 3 YO (QUADRIVALENT) [81043]     Vaccine Administration, Initial [76044]        Primary Care Provider Office Phone # Fax #    MarshallMai Kumar [phone] [address] [address]        Equal Access to Services     GAYATHRI BACON : Hadii aad ku hadasho Sominh, waaxda luqadaha, qaybta kaalmada adegerardo, erickson morse . So [facility] [phone].    ATENCIÓN: Si habla español, tiene a george disposición servicios gratuitos de asistencia lingüística. Llame al [phone].    We comply with applicable federal civil rights laws and [place] laws. We do not discriminate on the basis of race, color, national origin, age, disability sex, sexual orientation or gender identity.            Thank you!     Thank you for choosing [facility]  for your care. Our goal is always to provide you with excellent care. Hearing back from our patients is one way we can continue to improve our services. Please take a few minutes to complete the written survey that you may receive in the mail after your visit with us. Thank you!             Your Updated Medication List - Protect others around you: Learn how to safely use, store and throw away your medicines at www.disposemymeds.org.          This list is accurate as of: 9/25/17 10:44 AM.  Always use your most recent med list.                   Brand Name Dispense Instructions for use Diagnosis    BENADRYL PO           FLUoxetine 10 MG capsule    PROzac    90 capsule    Take 1 capsule (10 mg) by mouth daily    Migraine with aura and  without status migrainosus, not intractable       fluticasone 50 MCG/ACT spray    FLONASE    1 g    Spray 1-2 sprays into both nostrils nightly as needed for rhinitis or allergies    Post-nasal drip       IBUPROFEN PO           * traMADol 50 MG tablet    ULTRAM    20 tablet    Take 1 tablet (50 mg) by mouth every 8 hours as needed for moderate pain    Acute pain of left shoulder       * traMADol 50 MG tablet    ULTRAM    30 tablet    Take 1 tablet (50 mg) by mouth every 6 hours as needed for moderate pain    Right wrist pain       * Notice:  This list has 2 medication(s) that are the same as other medications prescribed for you. Read the directions carefully, and ask your doctor or other care provider to review them with you.

## 2017-09-25 NOTE — TELEPHONE ENCOUNTER
1.  Date/reason for appt: 10/2/17- Right wrist injury, discuss surgery     2.  Referring provider: Dr. Seaman - internal referral     3.  Call to patient (Yes / No - short description): No -pt is referred. All records and imaging in Epic/PACS.     4.  Previous care at / records requested from:     [address] [place] [address] [facility] - 5/25/17, 8/10/17     -Xray Wrist 5/25/17 in PACS

## 2017-09-29 DIAGNOSIS — M79.642 BILATERAL HAND PAIN: Primary | ICD-10-CM

## 2017-09-29 DIAGNOSIS — M79.641 BILATERAL HAND PAIN: Primary | ICD-10-CM

## 2017-10-02 ENCOUNTER — OFFICE VISIT (OUTPATIENT)
Dept: ORTHOPEDICS | Facility: CLINIC | Age: 42
End: 2017-10-02

## 2017-10-02 VITALS — WEIGHT: 157.1 LBS | HEIGHT: 70 IN | BODY MASS INDEX: 22.49 KG/M2

## 2017-10-02 DIAGNOSIS — M65.4 RADIAL STYLOID TENOSYNOVITIS: Primary | ICD-10-CM

## 2017-10-02 ASSESSMENT — ENCOUNTER SYMPTOMS
NECK MASS: 0
TASTE DISTURBANCE: 0
SMELL DISTURBANCE: 0
SINUS CONGESTION: 1
SINUS PAIN: 1

## 2017-10-02 NOTE — MR AVS SNAPSHOT
After Visit Summary   10/2/2017    Renetta Lauren    MRN: [id number]           Patient Information     Date Of Birth          1975        Visit Information        Provider Department      10/2/2017 10:00 AM Brian Meyers MD [facility]        Today's Diagnoses     Radial styloid tenosynovitis    -  1       Follow-ups after your visit        Additional Services     HAND THERAPY       Modify thumb spica splint for comfort and to reduce carpal tunnel symptoms.   Hand Therapy Referral                  Who to contact     Please call your clinic at [phone] to:    Ask questions about your health    Make or cancel appointments    Discuss your medicines    Learn about your test results    Speak to your doctor   If you have compliments or concerns about an experience at your clinic, or if you wish to file a complaint, please contact [facility] Patient Relations at [phone] or email us at [email or web address]         Additional Information About Your Visit        MyChart Information     [employer]t gives you secure access to your electronic health record. If you see a primary care provider, you can also send messages to your care team and make appointments. If you have questions, please call your primary care clinic.  If you do not have a primary care provider, please call [phone] and they will assist you.      [employer] is an electronic gateway that provides easy, online access to your medical records. With Satellier, you can request a clinic appointment, read your test results, renew a prescription or communicate with your care team.     To access your existing account, please contact your [facility] or call [phone] for assistance.        Care EveryWhere ID     This is your Care EveryWhere ID. This could be used by other organizations to access your [place] medical records  [id number]        Your  "Vitals Were     Height BMI (Body Mass Index)                1.778 m (5' 10\") 22.54 kg/m2           Blood Pressure from Last 3 Encounters:   08/10/17 123/75   05/25/17 126/81   12/15/16 110/76    Weight from Last 3 Encounters:   10/02/17 71.3 kg (157 lb 1.6 oz)   08/10/17 72.6 kg (160 lb)   05/25/17 72.7 kg (160 lb 4.8 oz)              We Performed the Following     HAND THERAPY        Primary Care Provider Office Phone # Fax #    ForrestonMai Kumar [phone] [address] [place] [address]        Equal Access to Services     GAYATHRI BACON : Joel garciao Sominh, waaxda luqadaha, qaybta kaalmada adeegyada, erickson morse . So [facility] [phone].    ATENCIÓN: Si habla español, tiene a george disposición servicios gratuitos de asistencia lingüística. Laura al [phone].    We comply with applicable federal civil rights laws and [place] laws. We do not discriminate on the basis of race, color, national origin, age, disability, sex, sexual orientation, or gender identity.            Thank you!     Thank you for choosing [facility]  for your care. Our goal is always to provide you with excellent care. Hearing back from our patients is one way we can continue to improve our services. Please take a few minutes to complete the written survey that you may receive in the mail after your visit with us. Thank you!             Your Updated Medication List - Protect others around you: Learn how to safely use, store and throw away your medicines at www.disposemymeds.org.          This list is accurate as of: 10/2/17 11:59 PM.  Always use your most recent med list.                   Brand Name Dispense Instructions for use Diagnosis    BENADRYL PO           FLUoxetine 10 MG capsule    PROzac    90 capsule    Take 1 capsule (10 mg) by mouth daily    Migraine with aura and without status migrainosus, not intractable       fluticasone 50 " MCG/ACT spray    FLONASE    1 g    Spray 1-2 sprays into both nostrils nightly as needed for rhinitis or allergies    Post-nasal drip       IBUPROFEN PO           * traMADol 50 MG tablet    ULTRAM    20 tablet    Take 1 tablet (50 mg) by mouth every 8 hours as needed for moderate pain    Acute pain of left shoulder       * traMADol 50 MG tablet    ULTRAM    30 tablet    Take 1 tablet (50 mg) by mouth every 6 hours as needed for moderate pain    Right wrist pain       * Notice:  This list has 2 medication(s) that are the same as other medications prescribed for you. Read the directions carefully, and ask your doctor or other care provider to review them with you.

## 2017-10-02 NOTE — LETTER
"10/2/2017       RE: Renetta Lauren  [address]    [place] [address]     Dear Colleague,    Thank you for referring your patient, Renetta Lauren, to the [facility] at [facility]. Please see a copy of my visit note below.    Chief complaint: Right hand pain     HPI: Renetta is a 33-year-old female presenting with a chief complaint of right hand pain. This is been going on for about a year and a half. Symptoms began around the time when she had her child. She had a prolonged period of bedrest prior to delivery which feels may have caused her to become weak and susceptible to injury. She works as a . At first, she just noticed pain at night. However her symptoms have progressed and now she has pain throughout the day. It is quite bothersome at work. She has been [facility] at [facility] tor her discomfort, which she finds troublesome. She has done occupational therapy and uses a soft brace for driving. However, she is unable to wear it while typing. She has also tried massage and ice. She had an injection in the wrist 3/25/16 by Dr. Seaman  which she says improved her symptoms about 30% but caused her to have \"atrophy\". At first the pain localized to the first dorsal wrist compartment but now she also has pain up the center of the forearm volarly. She has had intermittent similar sides on the opposite side but the left side is not particularly bothersome to her at the moment. She also reports having numbness and tingling into thumb index and middle fingers that occurs after writing.     A 13 point review of system was performed and is included below.     Past Medical History:   Diagnosis Date     Asthma      Depression ages [age]    estrogen induced from OCP     Depressive disorder ages [age]     Miscarriage Nov 2011    1st trimester     Ulcer (H)      UTI (lower urinary tract infection)        Past Surgical History:   Procedure Laterality " "Date     C APPENDECTOMY  1991     C STOMACH SURGERY PROCEDURE UNLISTED  1990    Appendectomy     OPERATIVE HYSTEROSCOPY WITH MORCELLATOR N/A 11/23/2015    Procedure: OPERATIVE HYSTEROSCOPY WITH MORCELLATOR;  Surgeon: Reyna Marshall MD;  Location: [facility] OR       Current Outpatient Prescriptions   Medication     traMADol (ULTRAM) 50 MG tablet     traMADol (ULTRAM) 50 MG tablet     IBUPROFEN PO     FLUoxetine (PROZAC) 10 MG capsule     DiphenhydrAMINE HCl (BENADRYL PO)     fluticasone (FLONASE) 50 MCG/ACT nasal spray     No current facility-administered medications for this visit.        Allergies   Allergen Reactions     Nkda [No Known Drug Allergies]      Social history:   Works as [employer]. Denies tobacco use. Drinks alcohol occasionally.       Family History   Problem Relation Age of Onset     Family History Negative Other      Hypertension Mother      Other Cancer Mother      CANCER Mother      Family History Negative Father      Coronary Artery Disease Maternal Grandfather      DIABETES No family hx of      Hyperlipidemia No family hx of      CEREBROVASCULAR DISEASE No family hx of      ,     Breast Cancer No family hx of      Colon Cancer No family hx of      Prostate Cancer No family hx of      Depression No family hx of      Anxiety Disorder No family hx of      MENTAL ILLNESS No family hx of      Substance Abuse No family hx of      Anesthesia Reaction No family hx of      Asthma No family hx of      OSTEOPOROSIS No family hx of      Genetic Disorder No family hx of      Thyroid Disease No family hx of      Obesity No family hx of      Unknown/Adopted No family hx of        Physical exam:   Ht 1.778 m (5' 10\")  Wt 71.3 kg (157 lb 1.6 oz)  BMI 22.54 kg/m2  :  R hand  level 1 force: 18.1 kg (40 lb)  R hand  level 3 force: 20.9 kg (46 lb)  R hand  level 5 force: 2.722 kg (6 lb)  L hand   level 1 force: 19.1 kg (42 lb)  L hand  level 3 force: 20.4 kg (45 lb)  L hand  level 5 " force: 16.8 kg (37 lb  Quickdash: 50  Well-developed, well-nourished in no acute distress  [facility].   Right upper extremity:   Skin clean dry and intact  Marked swelling of first dorsal compartment, also present but to a lesser degree contralaterally   Tender over first dorsal compartment  No pain over thumb CMC  No pain with CMC grind  Positive Finkelstein's  Pain with thumb extension vs. Resistance  Negative Tinel's at carpal or cubital tunnel  Negative Phalens  Positive carpal compression test  Negative elbow flexion compression test    X-rays were reviewed from 5/25/17 of the right wrist and are unremarkable.    Assessment: right dequervain's tenosynovitis, possible carpal tunnel syndrome.     Plan: I discussed the diagnosis with Renetta. Her Adrian's symptoms are quite severe. Treatment options include, splinting, injecting, therapy and surgery. She is very averse to any sort of surgery or repeat injection at this point. Therefore I think it is reasonable to attempt another course of splinting. She has a thumb spica splint but has worn infrequently because she finds it uncomfortable. She also finds it makes her numbness and tingling worse. Therefore I'd like her to go back to therapy to get the splint modified to hopefully make it more comfortable for her. I would recommend her wearing it 23 hours a day.  I would also like her to get an EMG to evaluate her numbness and tingling. I will see her back in 2 months to see how she is doing.     Brian Meyers MD

## 2017-10-02 NOTE — PROGRESS NOTES
"Chief complaint: Right hand pain     HPI: Renetta is a 33-year-old female presenting with a chief complaint of right hand pain. This is been going on for about a year and a half. Symptoms began around the time when she had her child. She had a prolonged period of bedrest prior to delivery which feels may have caused her to become weak and susceptible to injury. She works as a . At first, she just noticed pain at night. However her symptoms have progressed and now she has pain throughout the day. It is quite bothersome at work. She has been taking tramadol at [facility] tor her discomfort, which she finds troublesome. She has done occupational therapy and uses a soft brace for driving. However, she is unable to wear it while typing. She has also tried massage and ice. She had an injection in the wrist 3/25/16 by Dr. Seaman  which she says improved her symptoms about 30% but caused her to have \"atrophy\". At first the pain localized to the first dorsal wrist compartment but now she also has pain up the center of the forearm volarly. She has had intermittent similar sides on the opposite side but the left side is not particularly bothersome to her at the moment. She also reports having numbness and tingling into thumb index and middle fingers that occurs after writing.     A 13 point review of system was performed and is included below.     Past Medical History:   Diagnosis Date     Asthma      Depression ages [age]    estrogen induced from OCP     Depressive disorder ages [age]     Miscarriage Nov 2011    1st trimester     Ulcer (H)      UTI (lower urinary tract infection)        Past Surgical History:   Procedure Laterality Date     C APPENDECTOMY  1991     C STOMACH SURGERY PROCEDURE UNLISTED  1990    Appendectomy     OPERATIVE HYSTEROSCOPY WITH MORCELLATOR N/A 11/23/2015    Procedure: OPERATIVE HYSTEROSCOPY WITH MORCELLATOR;  Surgeon: Reyna Marshall MD;  Location: [facility] OR       Current Outpatient " "Prescriptions   Medication     traMADol (ULTRAM) 50 MG tablet     traMADol (ULTRAM) 50 MG tablet     IBUPROFEN PO     FLUoxetine (PROZAC) 10 MG capsule     DiphenhydrAMINE HCl (BENADRYL PO)     fluticasone (FLONASE) 50 MCG/ACT nasal spray     No current facility-administered medications for this visit.        Allergies   Allergen Reactions     Nkda [No Known Drug Allergies]      Social history:   Works as . Denies tobacco use. Drinks alcohol occasionally.       Family History   Problem Relation Age of Onset     Family History Negative Other      Hypertension Mother      Other Cancer Mother      CANCER Mother      Family History Negative Father      Coronary Artery Disease Maternal Grandfather      DIABETES No family hx of      Hyperlipidemia No family hx of      CEREBROVASCULAR DISEASE No family hx of      ,     Breast Cancer No family hx of      Colon Cancer No family hx of      Prostate Cancer No family hx of      Depression No family hx of      Anxiety Disorder No family hx of      MENTAL ILLNESS No family hx of      Substance Abuse No family hx of      Anesthesia Reaction No family hx of      Asthma No family hx of      OSTEOPOROSIS No family hx of      Genetic Disorder No family hx of      Thyroid Disease No family hx of      Obesity No family hx of      Unknown/Adopted No family hx of        Physical exam:   Ht 1.778 m (5' 10\")  Wt 71.3 kg (157 lb 1.6 oz)  BMI 22.54 kg/m2  :  R hand  level 1 force: 18.1 kg (40 lb)  R hand  level 3 force: 20.9 kg (46 lb)  R hand  level 5 force: 2.722 kg (6 lb)  L hand   level 1 force: 19.1 kg (42 lb)  L hand  level 3 force: 20.4 kg (45 lb)  L hand  level 5 force: 16.8 kg (37 lb  Quickdash: 50  Well-developed, well-nourished in no acute distress  Alert and oriented to surroudnings.   Right upper extremity:   Skin clean dry and intact  Marked swelling of first dorsal compartment, also present but to a lesser degree contralaterally   Tender " over first dorsal compartment  No pain over thumb CMC  No pain with CMC grind  Positive Finkelstein's  Pain with thumb extension vs. Resistance  Negative Tinel's at carpal or cubital tunnel  Negative Phalens  Positive carpal compression test  Negative elbow flexion compression test    X-rays were reviewed from 5/25/17 of the right wrist and are unremarkable.    Assessment: right dequervain's tenosynovitis, possible carpal tunnel syndrome.     Plan: I discussed the diagnosis with Renetta. Her DeQuervain's symptoms are quite severe. Treatment options include, splinting, injecting, therapy and surgery. She is very averse to any sort of surgery or repeat injection at this point. Therefore I think it is reasonable to attempt another course of splinting. She has a thumb spica splint but has worn infrequently because she finds it uncomfortable. She also finds it makes her numbness and tingling worse. Therefore I'd like her to go back to therapy to get the splint modified to hopefully make it more comfortable for her. I would recommend her wearing it 23 hours a day.  I would also like her to get an EMG to evaluate her numbness and tingling. I will see her back in 2 months to see how she is doing.     Answers for HPI/ROS submitted by the patient on 10/2/2017   General Symptoms: No  Skin Symptoms: No  HENT Symptoms: Yes  EYE SYMPTOMS: No  HEART SYMPTOMS: No  LUNG SYMPTOMS: No  INTESTINAL SYMPTOMS: No  URINARY SYMPTOMS: No  GYNECOLOGIC SYMPTOMS: No  BREAST SYMPTOMS: No  SKELETAL SYMPTOMS: No  BLOOD SYMPTOMS: No  NERVOUS SYSTEM SYMPTOMS: No  MENTAL HEALTH SYMPTOMS: No  Congestion: Yes  Sinus pain: Yes  Tooth pain: No  Gum tenderness: No  Bleeding gums: No  Change in taste: No  Change in sense of smell: No  Dry mouth: No  Hearing aid used: No  Neck lump: No

## 2017-10-02 NOTE — NURSING NOTE
"Reason For Visit:   Chief Complaint   Patient presents with     Consult     Pt. states that she is here today for Right Wrist Pain and Numbness. She mention that she is currently wearing a Wrist Brace and attending Physical Therapy once a month.        Primary MD: Renetta Kumar      Age: 42 year old      Ht 1.778 m (5' 10\")  Wt 71.3 kg (157 lb 1.6 oz)  BMI 22.54 kg/m2      Pain Assessment  Patient Currently in Pain: Yes  0-10 Pain Scale: 3  Primary Pain Location: Wrist  Pain Orientation: Right  Pain Descriptors: Discomfort, Aching, Numbness  Alleviating Factors: Rest, NSAIDS, Pain medication  Aggravating Factors: Movement, Bending    Hand Dominance Evaluation  Hand Dominance: Right      force  R hand  level 1 force: 18.1 kg (40 lb)  R hand  level 3 force: 20.9 kg (46 lb)  R hand  level 5 force: 2.722 kg (6 lb)  L hand   level 1 force: 19.1 kg (42 lb)  L hand  level 3 force: 20.4 kg (45 lb)  L hand  level 5 force: 16.8 kg (37 lb)    QuickDASH Assessment  QuickDASH Main 10/2/2017   1.Open a tight or new jar. Severe difficulty   2. Do heavy household chores (e.g., wash walls, floors) Moderate difficulty   3. Carry a shopping bag or briefcase. Mild difficulty   4. Wash your back. No difficulty   5. Use a knife to cut food. Mild difficulty   6. Recreational activities in which you take some force or impact through your arm, shoulder or hand (e.g., golf, hammering, tennis, etc.). Unable   7. During the past week, to what extent has your arm, shoulder or hand problem interfered with your normal social activities with family, friends, neighbours or groups? Quite a bit   8. During the past week, were you limited in your work or other regular daily activities as a result of your arm, shoulder or hand problem? Moderately limited   9. Arm, shoulder or hand pain. Moderate   10.Tingling (pins and needles) in your arm,shoulder or hand. Moderate   11. During the past week, how much difficulty " have you had sleeping because of the pain in your arm, shoulder or hand? ([place] number) Moderate difficulty   Quickdash Ability Score 50   1. Open a tight or new jar. -   2. Do heavy household chores (e.g., wash walls, floors). -   3. Carry a shopping bag or briefcase. -   4. Wash your back. -   5. Use a knife to cut food. -   6. Recreational activities in which you take some force or impact through your arm, shoulder or hand (e.g., golf, hammering, tennis, etc.). -   7. During the past week, to what extent has your arm, shoulder or hand problem interfered with your normal social activities with family, friends, neighbours or groups? -   8. During the past week, were you limited in your work or other regular daily activities as a result of your arm, shoulder or hand problem? -   9. Arm, shoulder or hand pain. -   10. Tingling (pins and needles) in your arm,shoulder or hand. -   11. During the past week, how much difficulty have you had sleeping because of the pain in your arm, shoulder or hand? ([place] number) -   SUM: -          Current Outpatient Prescriptions   Medication Sig Dispense Refill     traMADol (ULTRAM) 50 MG tablet Take 1 tablet (50 mg) by mouth every 6 hours as needed for moderate pain 30 tablet 3     traMADol (ULTRAM) 50 MG tablet Take 1 tablet (50 mg) by mouth every 8 hours as needed for moderate pain 20 tablet 0     IBUPROFEN PO        FLUoxetine (PROZAC) 10 MG capsule Take 1 capsule (10 mg) by mouth daily 90 capsule 3     DiphenhydrAMINE HCl (BENADRYL PO)        fluticasone (FLONASE) 50 MCG/ACT nasal spray Spray 1-2 sprays into both nostrils nightly as needed for rhinitis or allergies 1 g 3       Allergies   Allergen Reactions     Nkda [No Known Drug Allergies]        Olena Betancourt MA

## 2017-10-25 ENCOUNTER — MYC REFILL (OUTPATIENT)
Dept: ORTHOPEDICS | Facility: CLINIC | Age: 42
End: 2017-10-25

## 2017-10-25 ENCOUNTER — TELEPHONE (OUTPATIENT)
Dept: ORTHOPEDICS | Facility: CLINIC | Age: 42
End: 2017-10-25

## 2017-10-25 DIAGNOSIS — M25.531 RIGHT WRIST PAIN: ICD-10-CM

## 2017-10-25 RX ORDER — TRAMADOL HYDROCHLORIDE 50 MG/1
50 TABLET ORAL EVERY 6 HOURS PRN
Qty: 30 TABLET | Refills: 3 | Status: SHIPPED | OUTPATIENT
Start: 2017-10-25 | End: 2018-01-23

## 2017-10-25 NOTE — TELEPHONE ENCOUNTER
Message from [place]:  Rhiannon Morales LPN Wed Oct 25, 2017 2:58 PM        ----- Message -----   From: Renetta Lauren   Sent: 10/25/2017 2:44 PM   To: Sports Med Triage-[facility]  Subject: Medication Renewal Request     Original authorizing provider: MD Renetta Connolly would like a refill of the following medications:  traMADol (ULTRAM) 50 MG tablet [Orquidea Seaman MD]    Preferred pharmacy: [facility]/PHARMACY #[id number] [address]    Comment:

## 2017-10-26 NOTE — TELEPHONE ENCOUNTER
Talked to patient about the tramadol prescription. She requested to pick it up at the clinic. She stated that she would get it on 10/27/17. All questions were answered and Rx was put on 1st floor.

## 2017-12-07 ENCOUNTER — OFFICE VISIT (OUTPATIENT)
Dept: FAMILY MEDICINE | Facility: CLINIC | Age: 42
End: 2017-12-07
Payer: MEDICAID

## 2017-12-07 VITALS
OXYGEN SATURATION: 99 % | SYSTOLIC BLOOD PRESSURE: 98 MMHG | TEMPERATURE: 99 F | WEIGHT: 156.4 LBS | BODY MASS INDEX: 22.39 KG/M2 | HEART RATE: 95 BPM | HEIGHT: 70 IN | DIASTOLIC BLOOD PRESSURE: 64 MMHG

## 2017-12-07 DIAGNOSIS — J01.90 ACUTE SINUSITIS WITH SYMPTOMS > 10 DAYS: Primary | ICD-10-CM

## 2017-12-07 PROCEDURE — 99214 OFFICE O/P EST MOD 30 MIN: CPT | Performed by: PREVENTIVE MEDICINE

## 2017-12-07 RX ORDER — CODEINE PHOSPHATE AND GUAIFENESIN 10; 100 MG/5ML; MG/5ML
1-2 SOLUTION ORAL EVERY 4 HOURS PRN
Qty: 240 ML | Refills: 0 | Status: SHIPPED | OUTPATIENT
Start: 2017-12-07 | End: 2018-01-05

## 2017-12-07 NOTE — NURSING NOTE
"Chief Complaint   Patient presents with     Sinus Problem     x 2 weeks, cough is worse at night-but all day long, productive cough-worsening in the past week     BP 98/64  Pulse 95  Temp 99  F (37.2  C) (Tympanic)  Ht 5' 10\" (1.778 m)  Wt 156 lb 6.4 oz (70.9 kg)  SpO2 99%  BMI 22.44 kg/m2 Estimated body mass index is 22.44 kg/(m^2) as calculated from the following:    Height as of this encounter: 5' 10\" (1.778 m).    Weight as of this encounter: 156 lb 6.4 oz (70.9 kg).  Medication Reconciliation: complete      Health Maintenance due pending provider review:  Pt informed due for ricky Tabares CMA  "

## 2017-12-07 NOTE — PROGRESS NOTES
"SUBJECTIVE:  Renetta Lauren, a 42 year old female scheduled an appointment to discuss the following issues:  Acute sinusitis with symptoms > 10 days  Pt has pain above and below her eyes for 2 weeks.  Also post nasal drip and cough  Was getting better but now seems to have gotten worse over past 2-3 days.  +fevers and chills  +runny nose  +productive cough  Medical, social, surgical, and family histories reviewed.    ROS:  C: NEGATIVE for fever, chills  E: NEGATIVE for vision changes   R: NEGATIVE for significant cough or SOB  CV: NEGATIVE for chest pain, palpitations   GI: NEGATIVE for nausea, abdominal pain, heartburn, or change in bowel habits  : NEGATIVE for frequency, dysuria, or hematuria  M: NEGATIVE for significant arthralgias or myalgia  N: NEGATIVE for weakness, dizziness or paresthesias or headache    OBJECTIVE:  BP 98/64  Pulse 95  Temp 99  F (37.2  C) (Tympanic)  Ht 5' 10\" (1.778 m)  Wt 156 lb 6.4 oz (70.9 kg)  SpO2 99%  BMI 22.44 kg/m2  EXAM:  GENERAL APPEARANCE: healthy, alert and no distress  EYES: EOMI,  PERRL  HENT: ear canals and TM's normal and nose and mouth without ulcers or lesions  RESP: lungs clear to auscultation - no rales, rhonchi or wheezes  CV: regular rates and rhythm, normal S1 S2, no S3 or S4 and no murmur, click or rub -  ABDOMEN:  soft, nontender, no HSM or masses and bowel sounds normal    ASSESSMENT/PLAN:  (J01.90) Acute sinusitis with symptoms > 10 days  (primary encounter diagnosis)  Plan: amoxicillin-clavulanate (AUGMENTIN) 875-125 MG         per tablet, guaiFENesin-codeine (ROBITUSSIN AC)        100-10 MG/5ML SOLN solution  Given duration of symptoms, likely bacterial component  Will treat with augmentin 875 mg two times per day  Also advise saline rinse  Pt also prescribed robitussin ac for cough  Follow up if not improving.    25 minutes spent with patient, over 50% time counseling, coordinating care and explaining about nature of the patient's conditions.    All " risks, benefits of treatment and further evaluation was reviewed with patient.  Pt expressed understanding.  Pt was in agreement with this plan.  Leroy Lauren MD

## 2017-12-07 NOTE — MR AVS SNAPSHOT
"              After Visit Summary   12/7/2017    Renetta Lauren    MRN: [id number]           Patient Information     Date Of Birth          1975        Visit Information        Provider Department      12/7/2017 11:00 AM Leroy Lauren MD [facility]         Follow-ups after your visit        Who to contact     If you have questions or need follow up information about today's clinic visit or your schedule please contact [facility] directly at [phone].  Normal or non-critical lab and imaging results will be communicated to you by MyChart, letter or phone within 4 business days after the clinic has received the results. If you do not hear from us within 7 days, please contact the clinic through [employer] or phone. If you have a critical or abnormal lab result, we will notify you by phone as soon as possible.  Submit refill requests through [employer] or call your pharmacy and they will forward the refill request to us. Please allow 3 business days for your refill to be completed.          Additional Information About Your Visit        MyChart Information     [employer] gives you secure access to your electronic health record. If you see a primary care provider, you can also send messages to your care team and make appointments. If you have questions, please call your primary care clinic.  If you do not have a primary care provider, please call [phone] and they will assist you.        Care EveryWhere ID     This is your Care EveryWhere ID. This could be used by other organizations to access your [facility] medical records  [id number]        Your Vitals Were     Pulse Temperature Height Pulse Oximetry BMI (Body Mass Index)       95 99  F (37.2  C) (Tympanic) 5' 10\" (1.778 m) 99% 22.44 kg/m2        Blood Pressure from Last 3 Encounters:   12/07/17 98/64   08/10/17 123/75   05/25/17 126/81    Weight from Last 3 Encounters:   12/07/17 156 lb 6.4 oz (70.9 kg)   10/02/17 157 " lb 1.6 oz (71.3 kg)   08/10/17 160 lb (72.6 kg)              Today, you had the following     No orders found for display         Today's Medication Changes          These changes are accurate as of: 12/7/17 11:14 AM.  If you have any questions, ask your nurse or doctor.               Stop taking these medicines if you haven't already. Please contact your care team if you have questions.     BENADRYL PO   Stopped by:  Leroy Lauren MD                    Primary Care Provider Office Phone # Fax #    [place] Kumar [phone] [address] [address]        Equal Access to Services     [facility]TE [facility] : Hadii hebert garciao Sominh, waaxda moise, qaybta kaalmada rafat, erickson morse . So [facility] [phone].    ATENCIÓN: Si habla español, tiene a george disposición servicios gratuitos de asistencia lingüística. Llame al [phone].    We comply with applicable federal civil rights laws and [place] laws. We do not discriminate on the basis of race, color, national origin, age, disability, sex, sexual orientation, or gender identity.            Thank you!     Thank you for choosing [facility]  for your care. Our goal is always to provide you with excellent care. Hearing back from our patients is one way we can continue to improve our services. Please take a few minutes to complete the written survey that you may receive in the mail after your visit with us. Thank you!             Your Updated Medication List - Protect others around you: Learn how to safely use, store and throw away your medicines at www.disposemymeds.org.          This list is accurate as of: 12/7/17 11:14 AM.  Always use your most recent med list.                   Brand Name Dispense Instructions for use Diagnosis    FLUoxetine 10 MG capsule    PROzac    90 capsule    Take 1 capsule (10 mg) by mouth daily    Migraine with aura and without  status migrainosus, not intractable       fluticasone 50 MCG/ACT spray    FLONASE    1 g    Spray 1-2 sprays into both nostrils nightly as needed for rhinitis or allergies    Post-nasal drip       IBUPROFEN PO           traMADol 50 MG tablet    ULTRAM    30 tablet    Take 1 tablet (50 mg) by mouth every 6 hours as needed for moderate pain    Right wrist pain

## 2017-12-29 DIAGNOSIS — G43.109 MIGRAINE WITH AURA AND WITHOUT STATUS MIGRAINOSUS, NOT INTRACTABLE: ICD-10-CM

## 2018-01-02 NOTE — TELEPHONE ENCOUNTER
Routing refill request to provider for review/approval because:  Drug not on the [facility] refill protocol for dx of migraine  Pended 1 month - due for physical  Adrianne WAGONER RN    Requested Prescriptions   Pending Prescriptions Disp Refills     FLUoxetine (PROZAC) 10 MG capsule [Pharmacy Med Name: FLUOXETINE HCL 10 MG CAPSULE] 90 capsule 2     Sig: TAKE 1 CAPSULE (10 MG) BY MOUTH DAILY    SSRIs Protocol Failed    12/29/2017  2:50 PM       Failed - PHQ-9 score less than 5 in past 6 months    The PHQ-9 criteria is meant to fail. It requires a PHQ-9 score review         Passed - Recent or future visit with authorizing provider    Patient had office visit in the last year or has a visit in the next 30 days with authorizing provider.  See chart review.              Passed - Patient is age 18 or older       Passed - No active pregnancy on record       Passed - No positive pregnancy test in last 12 months       Passed - Recent (6 mo) or future visit with authorizing provider's specialty    Patient had office visit in the last 6 months or has a visit in the next 30 days with authorizing provider.  See chart review.

## 2018-01-04 RX ORDER — FLUOXETINE 10 MG/1
CAPSULE ORAL
Qty: 30 CAPSULE | Refills: 0 | Status: SHIPPED | OUTPATIENT
Start: 2018-01-04 | End: 2018-01-05

## 2018-01-04 NOTE — TELEPHONE ENCOUNTER
Spoke with Radha from [facility] is calling to get an update on the Refill request  Ph. [phone]

## 2018-01-05 ENCOUNTER — OFFICE VISIT (OUTPATIENT)
Dept: FAMILY MEDICINE | Facility: CLINIC | Age: 43
End: 2018-01-05
Payer: COMMERCIAL

## 2018-01-05 VITALS
HEART RATE: 60 BPM | HEIGHT: 70 IN | BODY MASS INDEX: 22.62 KG/M2 | TEMPERATURE: 99.3 F | SYSTOLIC BLOOD PRESSURE: 114 MMHG | OXYGEN SATURATION: 95 % | RESPIRATION RATE: 14 BRPM | WEIGHT: 158 LBS | DIASTOLIC BLOOD PRESSURE: 74 MMHG

## 2018-01-05 DIAGNOSIS — G43.009 MIGRAINE WITHOUT AURA AND WITHOUT STATUS MIGRAINOSUS, NOT INTRACTABLE: ICD-10-CM

## 2018-01-05 DIAGNOSIS — R19.7 DIARRHEA, UNSPECIFIED TYPE: Primary | ICD-10-CM

## 2018-01-05 PROCEDURE — 99213 OFFICE O/P EST LOW 20 MIN: CPT | Performed by: FAMILY MEDICINE

## 2018-01-05 RX ORDER — FLUOXETINE 10 MG/1
CAPSULE ORAL
Qty: 30 CAPSULE | Refills: 0 | Status: SHIPPED | OUTPATIENT
Start: 2018-01-05 | End: 2018-02-23

## 2018-01-05 ASSESSMENT — ANXIETY QUESTIONNAIRES
3. WORRYING TOO MUCH ABOUT DIFFERENT THINGS: NOT AT ALL
7. FEELING AFRAID AS IF SOMETHING AWFUL MIGHT HAPPEN: NOT AT ALL
1. FEELING NERVOUS, ANXIOUS, OR ON EDGE: SEVERAL DAYS
IF YOU CHECKED OFF ANY PROBLEMS ON THIS QUESTIONNAIRE, HOW DIFFICULT HAVE THESE PROBLEMS MADE IT FOR YOU TO DO YOUR WORK, TAKE CARE OF THINGS AT HOME, OR GET ALONG WITH OTHER PEOPLE: SOMEWHAT DIFFICULT
6. BECOMING EASILY ANNOYED OR IRRITABLE: NOT AT ALL
GAD7 TOTAL SCORE: 2
2. NOT BEING ABLE TO STOP OR CONTROL WORRYING: NOT AT ALL
5. BEING SO RESTLESS THAT IT IS HARD TO SIT STILL: NOT AT ALL

## 2018-01-05 ASSESSMENT — PATIENT HEALTH QUESTIONNAIRE - PHQ9
SUM OF ALL RESPONSES TO PHQ QUESTIONS 1-9: 1
5. POOR APPETITE OR OVEREATING: SEVERAL DAYS

## 2018-01-05 NOTE — PATIENT INSTRUCTIONS
Diet and Lifestyle Tips for Irritable Bowel Syndrome (IBS)    Your healthcare professional may suggest some lifestyle changes to help control your IBS. Changing your diet and managing stress are two of the most important. Follow your healthcare provider s instructions and try some of the suggestions below.  Change your diet  Your diet may be an important cause of IBS symptoms. You may want to try the following:    Pay attention to what foods bother you, and avoid them. For example, dairy products are hard for some people to digest.    Drink 6 to 8 glasses of water a day.    Avoid caffeine and tobacco. These are muscle stimulants and can affect the working of your digestive tract.    Avoid alcohol, which can irritate your digestive tract and make your symptoms worse.    Eat more fiber if constipation is a problem. Fiber makes the stool softer and easier to pass through the colon.  Reduce stress  If stress or anxiety makes your IBS symptoms worse, learning how to manage stress may help you feel better. Try these tips:    Identify the causes of stress in your life.    Learn new ways to cope with them.    Regular exercise is a great way to relieve stress. It can also help ease constipation.  Date Last Reviewed: 7/1/2016 2000-2017 [employer]. [address] [place] [address], [place] [address]. All rights reserved. This information is not intended as a substitute for professional medical care. Always follow your healthcare professional's instructions.

## 2018-01-05 NOTE — NURSING NOTE
"Chief Complaint   Patient presents with     Abdominal Pain     stomach pain, gas issues       Initial /74  Pulse 60  Temp 99.3  F (37.4  C) (Tympanic)  Resp 14  Ht 5' 10\" (1.778 m)  Wt 158 lb (71.7 kg)  LMP 12/15/2017  SpO2 95%  BMI 22.67 kg/m2 Estimated body mass index is 22.67 kg/(m^2) as calculated from the following:    Height as of this encounter: 5' 10\" (1.778 m).    Weight as of this encounter: 158 lb (71.7 kg).  BP completed using cuff size: regular    Health Maintenance that is potentially due pending provider review:  Health Maintenance Due   Topic Date Due     URINE DRUG SCREEN Q1 YR  06/19/1989     ANDREW QUESTIONNAIRE 1 YEAR  08/15/2013     PHQ-9 Q1YR  12/18/2016         PHQ/ACT/ANDREW--Gave pt questionnare  "

## 2018-01-05 NOTE — PROGRESS NOTES
SUBJECTIVE:   Renetta Lauren is a 42 year old female who presents to clinic today for the following health issues:    Abdominal Pain      Duration: 1 month    Description (location/character/radiation): intermittent-alternating constipation and diarrhea       Associated flank pain: yes    Intensity:  moderate    Accompanying signs and symptoms:        Fever/Chills: YES- sometimes chills       Gas/Bloating: YES-  both       Nausea/vomitting: no        Diarrhea: YES       Dysuria or Hematuria: no     History (previous similar pain/trauma/previous testing): had dysentery 15 yrs ago    Precipitating or alleviating factors:       Pain worse with eating/BM/urination: yes with eating sometimes       Pain relieved by BM: YES- sometimes    Therapies tried and outcome: pepto-bismol, imodium, citricel, eating more fiber    LMP:  12-15-17  The patient presents to clinic for abdominal concerns. She reports that over the past one month, she has been having alternating episodes of constipation and diarrhea. Symptoms started one month. She received a prescription of amoxicillin which she is concerned that it might be co-related to her current symptoms. Number of episodes in the last 24 hours are 6. Loose clear/yellow bowel movements. She started to treat the diarrhea with pepto-bismol but had constipation.     Problem list and histories reviewed & adjusted, as indicated.  Additional history: as documented    Patient Active Problem List   Diagnosis     CARDIOVASCULAR SCREENING; LDL GOAL LESS THAN 160     PPROM Elevated Risk Factor     Need for Tdap vaccination      premature rupture of membranes (PPROM) with onset of labor after 24 hours of rupture in second trimester, antepartum     Right wrist pain     Radial styloid tenosynovitis of right hand     Post-nasal drip     Screening for cervical cancer     Migraine without aura and without status migrainosus, not intractable     Human papilloma virus (HPV) infection     Past  "Surgical History:   Procedure Laterality Date     C APPENDECTOMY  1991     C STOMACH SURGERY PROCEDURE UNLISTED  1990    Appendectomy     OPERATIVE HYSTEROSCOPY WITH MORCELLATOR N/A 11/23/2015    Procedure: OPERATIVE HYSTEROSCOPY WITH MORCELLATOR;  Surgeon: Reyna Marshall MD;  Location:  OR       Social History   Substance Use Topics     Smoking status: Never Smoker     Smokeless tobacco: Never Used     Alcohol use 0.0 oz/week      Comment: 1-2 glasses of wine per week     Family History   Problem Relation Age of Onset     Family History Negative Other      Hypertension Mother      Other Cancer Mother      CANCER Mother      Family History Negative Father      Coronary Artery Disease Maternal Grandfather      DIABETES No family hx of      Hyperlipidemia No family hx of      CEREBROVASCULAR DISEASE No family hx of      ,     Breast Cancer No family hx of      Colon Cancer No family hx of      Prostate Cancer No family hx of      Depression No family hx of      Anxiety Disorder No family hx of      MENTAL ILLNESS No family hx of      Substance Abuse No family hx of      Anesthesia Reaction No family hx of      Asthma No family hx of      OSTEOPOROSIS No family hx of      Genetic Disorder No family hx of      Thyroid Disease No family hx of      Obesity No family hx of      Unknown/Adopted No family hx of              Reviewed and updated as needed this visit by clinical staff     Reviewed and updated as needed this visit by Provider         ROS:  Constitutional, HEENT, cardiovascular, pulmonary, gi and gu systems are negative, except as otherwise noted.      OBJECTIVE:   /74  Pulse 60  Temp 99.3  F (37.4  C) (Tympanic)  Resp 14  Ht 5' 10\" (1.778 m)  Wt 158 lb (71.7 kg)  LMP 12/15/2017  SpO2 95%  BMI 22.67 kg/m2  Body mass index is 22.67 kg/(m^2).  GENERAL: healthy, alert and no distress  RESP: lungs clear to auscultation - no rales, rhonchi or wheezes  CV: regular rate and rhythm, normal S1 " S2, no S3 or S4, no murmur, click or rub, no peripheral edema and peripheral pulses strong  ABDOMEN: soft, nontender, no hepatosplenomegaly, no masses and bowel sounds normal    Diagnostic Test Results:  none     ASSESSMENT/PLAN:   1. Diarrhea, unspecified type  Diarrhea with prolonged course.  Ddx inc viral gastroenteritis, food poisoning,  irritable bowel     Less likely includes inflammatory bowel disease,protists such as giardia, amoebas, enteric bacterial infections,     Continue over-the-counter treatment, and anticipatory guidance.    Consider additional workup if does not improve  Reviewed the criteria for diagnosing irritable bowel syndrome is primarily a diagnosis of exclusion  The pathophysiology of gas is discussed; likely accommodation of swallowed air and fermentation by bacteria, producing methane, or intolerance to certain undigested sugars  measures such as slower eating, probiotics such as align, reduction of fermentation substrate such as lactose and oligosaccharides, the use of lactase and/or Beano, and elimination diet      2. Migraine without aura and without status migrainosus, not intractable  Assessment: Patient has a hx of migraines. Desires refill of medications.   Plan:  - acetaminophen-codeine (TYLENOL #3) 300-30 MG per tablet; Take 1 tablet by mouth every 14 days Use 1 tablet every 2 weeks for migraine.  This is a 4 month supply  Dispense: 10 tablet; Refill: 0    Kendall Garza MD  [facility]  PAGER: [phone]

## 2018-01-06 ASSESSMENT — ANXIETY QUESTIONNAIRES: GAD7 TOTAL SCORE: 2

## 2018-01-23 ENCOUNTER — MYC REFILL (OUTPATIENT)
Dept: ORTHOPEDICS | Facility: CLINIC | Age: 43
End: 2018-01-23

## 2018-01-23 DIAGNOSIS — M25.531 RIGHT WRIST PAIN: ICD-10-CM

## 2018-01-23 RX ORDER — TRAMADOL HYDROCHLORIDE 50 MG/1
50 TABLET ORAL EVERY 6 HOURS PRN
Qty: 30 TABLET | Refills: 3 | Status: SHIPPED | OUTPATIENT
Start: 2018-01-23 | End: 2018-05-07

## 2018-01-23 NOTE — TELEPHONE ENCOUNTER
Message from [employer]:  Original authorizing provider: MD Renetta Connolly would like a refill of the following medications:  traMADol (ULTRAM) 50 MG tablet [Orquidea Seaman MD]    Preferred pharmacy: [facility]/PHARMACY #[id number] [address]    Comment:

## 2018-02-23 DIAGNOSIS — K58.2 IRRITABLE BOWEL SYNDROME WITH BOTH CONSTIPATION AND DIARRHEA: Primary | ICD-10-CM

## 2018-02-23 NOTE — TELEPHONE ENCOUNTER
Reason for Call:  Other Refax medication    Detailed comments: FLUoxetine (PROZAC) 10 MG capsule  The pharmacy has never received it  Can we resend this medication please    Phone Number Patient can be reached at: Home number on file [phone] (home)    Best Time: anytime    Can we leave a detailed message on this number? YES    Call taken on 2/23/2018 at 2:40 PM by Savi Piedra

## 2018-02-23 NOTE — TELEPHONE ENCOUNTER
Tried to call pt - rang once, then silence.  Sent MyChart to pt instead  Last Rx from 1/5/2018  Needs appt with PCP - last 12/15/2016  Adrianne WAGONER RN

## 2018-02-28 RX ORDER — FLUOXETINE 10 MG/1
CAPSULE ORAL
Qty: 30 CAPSULE | Refills: 0 | Status: SHIPPED | OUTPATIENT
Start: 2018-02-28 | End: 2018-03-20

## 2018-02-28 NOTE — TELEPHONE ENCOUNTER
Patient has not read [employer] message  Called her - wants to see Dr Garza as PCP    Dr Garza,   Please advise on refill  Unsure of which dx to attach to this med  No dx of depression or anxiety on problem list  Thanks,  Adrianne WAGONER RN    Next 5 appointments (look out 90 days)     Mar 20, 2018  2:00 PM CDT   Office Visit with Kendall Garza MD   [facility] ([facility])    [address]  [facility] [address] [phone]

## 2018-03-20 ENCOUNTER — OFFICE VISIT (OUTPATIENT)
Dept: FAMILY MEDICINE | Facility: CLINIC | Age: 43
End: 2018-03-20
Payer: COMMERCIAL

## 2018-03-20 VITALS
WEIGHT: 158.5 LBS | DIASTOLIC BLOOD PRESSURE: 72 MMHG | SYSTOLIC BLOOD PRESSURE: 128 MMHG | BODY MASS INDEX: 22.69 KG/M2 | HEART RATE: 74 BPM | TEMPERATURE: 97.8 F | HEIGHT: 70 IN | OXYGEN SATURATION: 96 %

## 2018-03-20 DIAGNOSIS — Z00.00 ROUTINE GENERAL MEDICAL EXAMINATION AT A HEALTH CARE FACILITY: Primary | ICD-10-CM

## 2018-03-20 PROCEDURE — 99396 PREV VISIT EST AGE 40-64: CPT | Performed by: FAMILY MEDICINE
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
## 2018-03-20 NOTE — MR AVS SNAPSHOT
After Visit Summary   3/20/2018    Renetta Lauren    MRN: [id number]           Patient Information     Date Of Birth          1975        Visit Information        Provider Department      3/20/2018 2:00 PM Kendall Garza MD [facility]        Care Instructions      Preventive Health Recommendations  Female Ages 40 to 49    Yearly exam:     See your health care provider every year in order to  1. Review health changes.   2. Discuss preventive care.    3. Review your medicines if your doctor prescribed any.      Get a Pap test every three years (unless you have an abnormal result and your provider advises testing more often).      If you get Pap tests with HPV test, you only need to test every 5 years, unless you have an abnormal result. You do not need a Pap test if your uterus was removed (hysterectomy) and you have not had cancer.      You should be tested each year for STDs (sexually transmitted diseases), if you're at risk.       Ask your doctor if you should have a mammogram.      Have a colonoscopy (test for colon cancer) if someone in your family has had colon cancer or polyps before age 50.       Have a cholesterol test every 5 years.       Have a diabetes test (fasting glucose) after age 45. If you are at risk for diabetes, you should have this test every 3 years.    Shots: Get a flu shot each year. Get a tetanus shot every 10 years.     Nutrition:     Eat at least 5 servings of fruits and vegetables each day.    Eat whole-grain bread, whole-wheat pasta and brown rice instead of white grains and rice.    Talk to your provider about Calcium and Vitamin D.     Lifestyle    Exercise at least 150 minutes a week (an average of 30 minutes a day, 5 days a week). This will help you control your weight and prevent disease.    Limit alcohol to one drink per day.    No smoking.     Wear sunscreen to prevent skin cancer.    See your dentist every six months for an exam and  "cleaning.          Follow-ups after your visit        Follow-up notes from your care team     Return in about 1 year (around 3/20/2019), or if symptoms worsen or fail to improve.      Your next 10 appointments already scheduled     Apr 11, 2018  2:00 PM CDT   (Arrive by 1:45 PM)   MA SCREENING DIGITAL BILATERAL with SHBCMA2   [facility] ([facility])    [address] [place] [address], [address]  [facility] [address] [phone]           Do not use any powder, lotion or deodorant under your arms or on your breast. If you do, we will ask you to remove it before your exam.  Wear comfortable, two-piece clothing.  If you have any allergies, tell your care team.  Bring any previous mammograms from other facilities or have them mailed to the breast center. Three-dimensional (3D) mammograms are available at [place] locations in [facility], [place], [facility], [place], [place], and [place]. [facility] locations include [place] and [facility] in [place]. Benefits of 3D mammograms include: - Improved rate of cancer detection - Decreases your chance of having to go back for more tests, which means fewer: - \"False-positive\" results (This means that there is an abnormal area but it isn't cancer.) - Invasive testing procedures, such as a biopsy or surgery - Can provide clearer images of the breast if you have dense breast tissue. 3D mammography is an optional exam that anyone can have with a 2D mammogram. It doesn't replace or take the place of a 2D mammogram. 2D mammograms remain an effective screening test for all women.  Not all insurance companies cover the cost of a 3D mammogram. Check with your insurance.              Future tests that were ordered for you today     Open Future Orders        Priority Expected Expires Ordered    *MA Screening Digital Bilateral Routine  3/20/2019 3/20/2018            Who to contact     If you have questions or need " "follow up information about today's clinic visit or your schedule please contact [facility] directly at [phone].  Normal or non-critical lab and imaging results will be communicated to you by MyChart, letter or phone within 4 business days after the clinic has received the results. If you do not hear from us within 7 days, please contact the clinic through [employer] or phone. If you have a critical or abnormal lab result, we will notify you by phone as soon as possible.  Submit refill requests through [employer] or call your pharmacy and they will forward the refill request to us. Please allow 3 business days for your refill to be completed.          Additional Information About Your Visit        [employer] Information     [employer] gives you secure access to your electronic health record. If you see a primary care provider, you can also send messages to your care team and make appointments. If you have questions, please call your primary care clinic.  If you do not have a primary care provider, please call [phone] and they will assist you.        Care EveryWhere ID     This is your Care EveryWhere ID. This could be used by other organizations to access your [facility] medical records  [id number]        Your Vitals Were     Pulse Temperature Height Last Period Pulse Oximetry Breastfeeding?    74 97.8  F (36.6  C) (Oral) 5' 10\" (1.778 m) 03/12/2018 (Exact Date) 96% No    BMI (Body Mass Index)                   22.74 kg/m2            Blood Pressure from Last 3 Encounters:   03/20/18 128/72   01/05/18 114/74   12/07/17 98/64    Weight from Last 3 Encounters:   03/20/18 158 lb 8 oz (71.9 kg)   01/05/18 158 lb (71.7 kg)   12/07/17 156 lb 6.4 oz (70.9 kg)              Today, you had the following     No orders found for display         Today's Medication Changes          These changes are accurate as of 3/20/18  2:49 PM.  If you have any questions, ask your nurse or doctor.               These medicines have " changed or have updated prescriptions.        Dose/Directions    FLUoxetine 20 MG capsule   Commonly known as:  PROzac   This may have changed:  medication strength   Changed by:  Kendall Garza MD        TAKE 1 CAPSULE (10 MG) BY MOUTH DAILY   Quantity:  90 capsule   Refills:  1            Where to get your medicines      These medications were sent to [facility]/pharmacy #[id number] - [place], [place] - [address]  [address] [facility] [address]     Phone:  [phone]     FLUoxetine 20 MG capsule                Primary Care Provider Office Phone # Fax #    Kendall Garza [phone] [address] [facility]  [place] [address]        Equal Access to Services     AN[facility]TE [facility] Center[facility]ANTON : Hadii hebert kennedy Sominh, waaxda moise, qaybta kaalmada rafat, erickson morse . So [facility] [phone].    ATENCIÓN: Si habla español, tiene a george disposición servicios gratuitos de asistencia lingüística. Llame al [phone].    We comply with applicable federal civil rights laws and [place] laws. We do not discriminate on the basis of race, color, national origin, age, disability, sex, sexual orientation, or gender identity.            Thank you!     Thank you for choosing [facility]  for your care. Our goal is always to provide you with excellent care. Hearing back from our patients is one way we can continue to improve our services. Please take a few minutes to complete the written survey that you may receive in the mail after your visit with us. Thank you!             Your Updated Medication List - Protect others around you: Learn how to safely use, store and throw away your medicines at www.disposemymeds.org.          This list is accurate as of 3/20/18  2:49 PM.  Always use your most recent med list.                   Brand Name Dispense Instructions for use Diagnosis    acetaminophen-codeine 300-30 MG per tablet    TYLENOL #3    10 tablet     Take 1 tablet by mouth every 14 days Use 1 tablet every 2 weeks for migraine.  This is a 4 month supply    Migraine without aura and without status migrainosus, not intractable       FLUoxetine 20 MG capsule    PROzac    90 capsule    TAKE 1 CAPSULE (10 MG) BY MOUTH DAILY        fluticasone 50 MCG/ACT spray    FLONASE    1 g    Spray 1-2 sprays into both nostrils nightly as needed for rhinitis or allergies    Post-nasal drip       IBUPROFEN PO           traMADol 50 MG tablet    ULTRAM    30 tablet    Take 1 tablet (50 mg) by mouth every 6 hours as needed for moderate pain    Right wrist pain

## 2018-04-05 ENCOUNTER — OFFICE VISIT (OUTPATIENT)
Dept: FAMILY MEDICINE | Facility: CLINIC | Age: 43
End: 2018-04-05
Payer: COMMERCIAL

## 2018-04-05 VITALS
HEART RATE: 72 BPM | DIASTOLIC BLOOD PRESSURE: 77 MMHG | HEIGHT: 70 IN | WEIGHT: 158.4 LBS | BODY MASS INDEX: 22.68 KG/M2 | TEMPERATURE: 99.2 F | SYSTOLIC BLOOD PRESSURE: 114 MMHG | OXYGEN SATURATION: 100 %

## 2018-04-05 DIAGNOSIS — J20.9 ACUTE BRONCHITIS, UNSPECIFIED ORGANISM: Primary | ICD-10-CM

## 2018-04-05 PROCEDURE — 99213 OFFICE O/P EST LOW 20 MIN: CPT | Performed by: FAMILY MEDICINE

## 2018-04-05 RX ORDER — CODEINE PHOSPHATE AND GUAIFENESIN 10; 100 MG/5ML; MG/5ML
1 SOLUTION ORAL EVERY 4 HOURS PRN
Qty: 120 ML | Refills: 0 | Status: SHIPPED | OUTPATIENT
Start: 2018-04-05 | End: 2018-09-20

## 2018-04-05 RX ORDER — AZITHROMYCIN 250 MG/1
TABLET, FILM COATED ORAL
Qty: 6 TABLET | Refills: 0 | Status: SHIPPED | OUTPATIENT
Start: 2018-04-05 | End: 2018-08-08

## 2018-04-05 NOTE — PROGRESS NOTES
SUBJECTIVE:   Renetta Lauren is a 42 year old female who presents to clinic today for the following health issues:    RESPIRATORY SYMPTOMS      Duration: 6 days    Description  cough, fever and chills    Severity: moderate    Accompanying signs and symptoms: temp of 100.0 to 101.5    History (predisposing factors):  Pneumonia and Bronchitis couple times a year     Precipitating or alleviating factors: Teacher     Therapies tried and outcome:  Tylenol,Ibuprofen for fever and decongestant/cough drops    Renetta presents to clinic for evaluation of worsening upper respiratory tract symptoms.  Over the past 1 week she has been having recurrent fevers and chills.  Worsening productive cough.  Green/yellow thick sputum.  She denies any chest pain or shortness of breath.  Denies any palpitations.  Had one episode of night sweats.  She denies any recent travel.  Denies any exposure to tuberculosis.    Problem list and histories reviewed & adjusted, as indicated.  Additional history: as documented    Patient Active Problem List   Diagnosis     CARDIOVASCULAR SCREENING; LDL GOAL LESS THAN 160     PPROM Elevated Risk Factor     Need for Tdap vaccination      premature rupture of membranes (PPROM) with onset of labor after 24 hours of rupture in second trimester, antepartum     Right wrist pain     Radial styloid tenosynovitis of right hand     Post-nasal drip     Screening for cervical cancer     Migraine without aura and without status migrainosus, not intractable     Human papilloma virus (HPV) infection     Past Surgical History:   Procedure Laterality Date     C APPENDECTOMY       C STOMACH SURGERY PROCEDURE UNLISTED      Appendectomy     OPERATIVE HYSTEROSCOPY WITH MORCELLATOR N/A 2015    Procedure: OPERATIVE HYSTEROSCOPY WITH MORCELLATOR;  Surgeon: Reyna Marshall MD;  Location:  OR       Social History   Substance Use Topics     Smoking status: Never Smoker     Smokeless tobacco: Never  "Used     Alcohol use 0.0 oz/week      Comment: 1-2 glasses of wine per week     Family History   Problem Relation Age of Onset     Family History Negative Other      Hypertension Mother      Other Cancer Mother      CANCER Mother      Family History Negative Father      Coronary Artery Disease Maternal Grandfather            Reviewed and updated as needed this visit by clinical staff       ROS:  Constitutional, HEENT, cardiovascular, pulmonary, gi and gu systems are negative, except as otherwise noted.    OBJECTIVE:     /77  Pulse 72  Temp 99.2  F (37.3  C) (Oral)  Ht 5' 10\" (1.778 m)  Wt 158 lb 6.4 oz (71.8 kg)  LMP 03/12/2018 (Exact Date)  SpO2 100%  Breastfeeding? No  BMI 22.73 kg/m2  Body mass index is 22.73 kg/(m^2).  GENERAL: healthy, alert and no distress  RESP: lungs clear to auscultation - no rales, rhonchi or wheezes  CV: regular rate and rhythm, normal S1 S2, no S3 or S4, no murmur, click or rub, no peripheral edema and peripheral pulses strong  ABDOMEN: soft, nontender, no hepatosplenomegaly, no masses and bowel sounds normal    Diagnostic Test Results:  none     ASSESSMENT/PLAN:   1. Acute bronchitis, unspecified organism  Assessment: exam consistent with bronchitis. Advised to return to clinic if symptoms fails to improve. Will obtain Chest X-ray at that time.   Plan:  - guaiFENesin-codeine (ROBITUSSIN AC) 100-10 MG/5ML SOLN solution; Take 5 mLs by mouth every 4 hours as needed for cough  Dispense: 120 mL; Refill: 0  - azithromycin (ZITHROMAX) 250 MG tablet; Two tablets first day, then one tablet daily for four days.  Dispense: 6 tablet; Refill: 0    Kendall Garza MD  [facility]  "

## 2018-04-05 NOTE — PATIENT INSTRUCTIONS

## 2018-04-05 NOTE — MR AVS SNAPSHOT
After Visit Summary   4/5/2018    Renetta Lauren    MRN: [id number]           Patient Information     Date Of Birth          1975        Visit Information        Provider Department      4/5/2018 1:30 PM Kendall Garza MD [facility]        Today's Diagnoses     Acute bronchitis, unspecified organism    -  1      Care Instructions      Acute Bronchitis  Your healthcare provider has told you that you have acute bronchitis. Bronchitis is infection or inflammation of the bronchial tubes (airways in the lungs). Normally, air moves easily in and out of the airways. Bronchitis narrows the airways, making it harder for air to flow in and out of the lungs. This causes symptoms such as shortness of breath, coughing up yellow or green mucus, and wheezing. Bronchitis can be acute or chronic. Acute means the condition comes on quickly and goes away in a short time, usually within 3 to 10 days. Chronic means a condition lasts a long time and often comes back.    What causes acute bronchitis?  Acute bronchitis almost always starts as a viral respiratory infection, such as a cold or the flu. Certain factors make it more likely for a cold or flu to turn into bronchitis. These include being very young, being elderly, having a heart or lung problem, or having a weak immune system. Cigarette smoking also makes bronchitis more likely.  When bronchitis develops, the airways become swollen. The airways may also become infected with bacteria. This is known as a secondary infection.  Diagnosing acute bronchitis  Your healthcare provider will examine you and ask about your symptoms and health history. You may also have a sputum culture to test the fluid in your lungs. Chest X-rays may be done to look for infection in the lungs.  Treating acute bronchitis  Bronchitis usually clears up as the cold or flu goes away. You can help feel better faster by doing the following:    Take medicine as directed. You  may be told to take ibuprofen or other over-the-counter medicines. These help relieve inflammation in your bronchial tubes. Your healthcare provider may prescribe an inhaler to help open up the bronchial tubes. Most of the time, acute bronchitis is caused by a viral infection. Antibiotics are usually not prescribed for viral infections.    Drink plenty of fluids, such as water, juice, or warm soup. Fluids loosen mucus so that you can cough it up. This helps you breathe more easily. Fluids also prevent dehydration.    Make sure you get plenty of rest.    Do not smoke. Do not allow anyone else to smoke in your home.  Recovery and follow-up  Follow up with your doctor as you are told. You will likely feel better in a week or two. But a dry cough can linger beyond that time. Let your doctor know if you still have symptoms (other than a dry cough) after 2 weeks, or if you re prone to getting bronchial infections. Take steps to protect yourself from future infections. These steps include stopping smoking and avoiding tobacco smoke, washing your hands often, and getting a yearly flu shot.  When to call your healthcare provider  Call the healthcare provider if you have any of the following:    Fever of 100.4 F (38.0 C) or higher, or as advised    Symptoms that get worse, or new symptoms    Trouble breathing    Symptoms that don t start to improve within a week, or within 3 days of taking antibiotics   Date Last Reviewed: 12/1/2016 2000-2017 [employer]. [address] [place], [place] [address]. All rights reserved. This information is not intended as a substitute for professional medical care. Always follow your healthcare professional's instructions.                Follow-ups after your visit        Your next 10 appointments already scheduled     Apr 11, 2018  2:00 PM CDT   (Arrive by 1:45 PM)   MA SCREENING DIGITAL BILATERAL with SHBCMA2   [facility] ([facility])     "[address], [address]  [facility] [address] [phone]           Do not use any powder, lotion or deodorant under your arms or on your breast. If you do, we will ask you to remove it before your exam.  Wear comfortable, two-piece clothing.  If you have any allergies, tell your care team.  Bring any previous mammograms from other facilities or have them mailed to the breast center. Three-dimensional (3D) mammograms are available at [facility] locations in [facility], [facility], and [place]. [facility] locations include [place] and [facility] in [place]. Benefits of 3D mammograms include: - Improved rate of cancer detection - Decreases your chance of having to go back for more tests, which means fewer: - \"False-positive\" results (This means that there is an abnormal area but it isn't cancer.) - Invasive testing procedures, such as a biopsy or surgery - Can provide clearer images of the breast if you have dense breast tissue. 3D mammography is an optional exam that anyone can have with a 2D mammogram. It doesn't replace or take the place of a 2D mammogram. 2D mammograms remain an effective screening test for all women.  Not all insurance companies cover the cost of a 3D mammogram. Check with your insurance.              Who to contact     If you have questions or need follow up information about today's clinic visit or your schedule please contact [facility] directly at [phone].  Normal or non-critical lab and imaging results will be communicated to you by MyChart, letter or phone within 4 business days after the clinic has received the results. If you do not hear from us within 7 days, please contact the clinic through [employer] or phone. If you have a critical or abnormal lab result, we will notify you by phone as soon as possible.  Submit refill requests through [employer] or call your pharmacy and they will forward the " "refill request to us. Please allow 3 business days for your refill to be completed.          Additional Information About Your Visit        [employer] Information     [employer] gives you secure access to your electronic health record. If you see a primary care provider, you can also send messages to your care team and make appointments. If you have questions, please call your primary care clinic.  If you do not have a primary care provider, please call [phone] and they will assist you.        Care EveryWhere ID     This is your Care EveryWhere ID. This could be used by other organizations to access your [facility] medical records  [id number]        Your Vitals Were     Pulse Temperature Height Last Period Pulse Oximetry Breastfeeding?    72 99.2  F (37.3  C) (Oral) 5' 10\" (1.778 m) 03/12/2018 (Exact Date) 100% No    BMI (Body Mass Index)                   22.73 kg/m2            Blood Pressure from Last 3 Encounters:   04/05/18 114/77   03/20/18 128/72   01/05/18 114/74    Weight from Last 3 Encounters:   04/05/18 158 lb 6.4 oz (71.8 kg)   03/20/18 158 lb 8 oz (71.9 kg)   01/05/18 158 lb (71.7 kg)              Today, you had the following     No orders found for display         Today's Medication Changes          These changes are accurate as of 4/5/18  2:00 PM.  If you have any questions, ask your nurse or doctor.               Start taking these medicines.        Dose/Directions    azithromycin 250 MG tablet   Commonly known as:  ZITHROMAX   Used for:  Acute bronchitis, unspecified organism   Started by:  Kendall Garza MD        Two tablets first day, then one tablet daily for four days.   Quantity:  6 tablet   Refills:  0       guaiFENesin-codeine 100-10 MG/5ML Soln solution   Commonly known as:  ROBITUSSIN AC   Used for:  Acute bronchitis, unspecified organism   Started by:  Kendall Garza MD        Dose:  1 tsp.   Take 5 mLs by mouth every 4 hours as needed for cough   Quantity:  120 mL   Refills:  " 0            Where to get your medicines      These medications were sent to [id number] - [place], [place] - [address]  [address] [address]     Phone:  [phone]     azithromycin 250 MG tablet         Some of these will need a paper prescription and others can be bought over the counter.  Ask your nurse if you have questions.     Bring a paper prescription for each of these medications     guaiFENesin-codeine 100-10 MG/5ML Soln solution                Primary Care Provider Office Phone # Fax #    Kendall Jack Garza [phone] [address]  [place] [address]        Equal Access to Services     [facility]: Hadii heebrt gonzáles hadasho Sominh, waaxda luqadaha, qaybta kaalmada adepaulyada, erickson morse . So [facility] [phone].    ATENCIÓN: Si habla español, tiene a george disposición servicios gratuitos de asistencia lingüística. [facility] [phone].    We comply with applicable federal civil rights laws and [place] laws. We do not discriminate on the basis of race, color, national origin, age, disability, sex, sexual orientation, or gender identity.            Thank you!     Thank you for choosing [facility]  for your care. Our goal is always to provide you with excellent care. Hearing back from our patients is one way we can continue to improve our services. Please take a few minutes to complete the written survey that you may receive in the mail after your visit with us. Thank you!             Your Updated Medication List - Protect others around you: Learn how to safely use, store and throw away your medicines at www.disposemymeds.org.          This list is accurate as of 4/5/18  2:00 PM.  Always use your most recent med list.                   Brand Name Dispense Instructions for use Diagnosis    acetaminophen-codeine 300-30 MG per tablet    TYLENOL #3    10 tablet    Take 1 tablet by mouth every 14  days Use 1 tablet every 2 weeks for migraine.  This is a 4 month supply    Migraine without aura and without status migrainosus, not intractable       azithromycin 250 MG tablet    ZITHROMAX    6 tablet    Two tablets first day, then one tablet daily for four days.    Acute bronchitis, unspecified organism       FLUoxetine 20 MG capsule    PROzac    90 capsule    TAKE 1 CAPSULE (10 MG) BY MOUTH DAILY        fluticasone 50 MCG/ACT spray    FLONASE    1 g    Spray 1-2 sprays into both nostrils nightly as needed for rhinitis or allergies    Post-nasal drip       guaiFENesin-codeine 100-10 MG/5ML Soln solution    ROBITUSSIN AC    120 mL    Take 5 mLs by mouth every 4 hours as needed for cough    Acute bronchitis, unspecified organism       IBUPROFEN PO           traMADol 50 MG tablet    ULTRAM    30 tablet    Take 1 tablet (50 mg) by mouth every 6 hours as needed for moderate pain    Right wrist pain

## 2018-04-18 ENCOUNTER — HOSPITAL ENCOUNTER (OUTPATIENT)
Dept: MAMMOGRAPHY | Facility: CLINIC | Age: 43
Discharge: HOME | End: 2018-04-18
Attending: FAMILY MEDICINE | Admitting: FAMILY MEDICINE
Payer: COMMERCIAL

## 2018-04-18 DIAGNOSIS — Z12.31 SCREENING MAMMOGRAM, ENCOUNTER FOR: ICD-10-CM

## 2018-04-18 PROCEDURE — 77067 SCR MAMMO BI INCL CAD: CPT

## 2018-04-25 ENCOUNTER — OFFICE VISIT (OUTPATIENT)
Dept: ORTHOPEDICS | Facility: CLINIC | Age: 43
End: 2018-04-25
Payer: COMMERCIAL

## 2018-04-25 VITALS — RESPIRATION RATE: 16 BRPM | BODY MASS INDEX: 22.62 KG/M2 | WEIGHT: 158 LBS | HEIGHT: 70 IN

## 2018-04-25 DIAGNOSIS — M65.4 RADIAL STYLOID TENOSYNOVITIS: Primary | ICD-10-CM

## 2018-04-25 RX ORDER — TRAMADOL HYDROCHLORIDE 50 MG/1
50 TABLET ORAL EVERY 6 HOURS PRN
Qty: 20 TABLET | Refills: 0 | Status: SHIPPED | OUTPATIENT
Start: 2018-04-25 | End: 2018-05-07

## 2018-04-25 NOTE — MR AVS SNAPSHOT
After Visit Summary   4/25/2018    Renetta Lauren    MRN: [id number]           Patient Information     Date Of Birth          1975        Visit Information        Provider Department      4/25/2018 2:15 PM Orquidea Seaman MD [facility]        Today's Diagnoses     Radial styloid tenosynovitis    -  1       Follow-ups after your visit        Additional Services     [facility]       Please call [phone] to make an appointment. Clinic is located: [facility] [address] [place] [address] [id number] [place] [address]      Please complete the following questions:    Procedure/Referral: Referral Only -  Comprehensive Evaluation and Management    What is your diagnosis for the patient's pain? DeQuervains, right    What are your specific questions for the pain specialist? Options for pain control, right arm    Are there any red flags that may impact the assessment or management of the patient? none                  Your next 10 appointments already scheduled     May 07, 2018 10:20 AM CDT   (Arrive by 10:05 AM)   New Patient Visit with Beverly Kebede MD   [facility] ([facility])    [address] [place] [id number] [phone]            May 08, 2018  1:45 PM CDT   (Arrive by 1:30 PM)   EMG with Dipak Lua MD   [facility] ([facility])    [address] [place] [id number] [phone]           Do not use lotions or creams on the area to be tested. If you are on blood thinners (Warfarin, Coumadin, Lovenox, etc), please contact your primary care physician to check if it is safe to stop them 3 days prior to testing. If you have anxiety, please check with your referring physician to obtain anti-anxiety medication for the procedure.              Future tests  "that were ordered for you today     Open Future Orders        Priority Expected Expires Ordered    EMG ([facility]) Routine  5/25/2018 4/25/2018            Who to contact     Please call your clinic at [phone] to:    Ask questions about your health    Make or cancel appointments    Discuss your medicines    Learn about your test results    Speak to your doctor            Additional Information About Your Visit        [employer] Information     [employer] gives you secure access to your electronic health record. If you see a primary care provider, you can also send messages to your care team and make appointments. If you have questions, please call your primary care clinic.  If you do not have a primary care provider, please call [phone] and they will assist you.      [employer] is an electronic gateway that provides easy, online access to your medical records. With [employer], you can request a clinic appointment, read your test results, renew a prescription or communicate with your care team.     To access your existing account, please contact your [facility] or call [phone] for assistance.        Care EveryWhere ID     This is your Care EveryWhere ID. This could be used by other organizations to access your [facility] medical records  [id number]        Your Vitals Were     Respirations Height BMI (Body Mass Index)             16 5' 10\" (1.778 m) 22.67 kg/m2          Blood Pressure from Last 3 Encounters:   04/05/18 114/77   03/20/18 128/72   01/05/18 114/74    Weight from Last 3 Encounters:   04/25/18 158 lb (71.7 kg)   04/05/18 158 lb 6.4 oz (71.8 kg)   03/20/18 158 lb 8 oz (71.9 kg)              We Performed the Following     [facility]          Today's Medication Changes          These changes are accurate as of 4/25/18  3:24 PM.  If you have any questions, ask your nurse or doctor.               These medicines have changed or have " updated prescriptions.        Dose/Directions    * traMADol 50 MG tablet   Commonly known as:  ULTRAM   This may have changed:  Another medication with the same name was added. Make sure you understand how and when to take each.   Used for:  Right wrist pain        Dose:  50 mg   Take 1 tablet (50 mg) by mouth every 6 hours as needed for moderate pain   Quantity:  30 tablet   Refills:  3       * traMADol 50 MG tablet   Commonly known as:  ULTRAM   This may have changed:  You were already taking a medication with the same name, and this prescription was added. Make sure you understand how and when to take each.   Used for:  Radial styloid tenosynovitis        Dose:  50 mg   Take 1 tablet (50 mg) by mouth every 6 hours as needed for severe pain   Quantity:  20 tablet   Refills:  0       * Notice:  This list has 2 medication(s) that are the same as other medications prescribed for you. Read the directions carefully, and ask your doctor or other care provider to review them with you.         Where to get your medicines      Some of these will need a paper prescription and others can be bought over the counter.  Ask your nurse if you have questions.     Bring a paper prescription for each of these medications     traMADol 50 MG tablet               Information about OPIOIDS     PRESCRIPTION OPIOIDS: WHAT YOU NEED TO KNOW   You have a prescription for an opioid (narcotic) pain medicine. Opioids can cause addiction. If you have a history of chemical dependency of any type, you are at a higher risk of becoming addicted to opioids. Only take this medicine after all other options have been tried. Take it for as short a time and as few doses as possible.     Do not:    Drive. If you drive while taking these medicines, you could be arrested for driving under the influence (DUI).    Operate heavy machinery    Do any other dangerous activities while taking these medicines.     Drink any alcohol while taking these medicines.       Take with any other medicines that contain acetaminophen. Read all labels carefully. Look for the word  acetaminophen  or  Tylenol.  Ask your pharmacist if you have questions or are unsure.    Store your pills in a secure place, locked if possible. We will not replace any lost or stolen medicine. If you don t finish your medicine, please throw away (dispose) as directed by your pharmacist. The [place] Pollution Control Agency has more information about safe disposal: https://www.pca.[facility].[place].us/living-green/managing-unwanted-medications    All opioids tend to cause constipation. Drink plenty of water and eat foods that have a lot of fiber, such as fruits, vegetables, prune juice, apple juice and high-fiber cereal. Take a laxative (Miralax, milk of magnesia, Colace, Senna) if you don t move your bowels at least every other day.          Primary Care Provider Office Phone # Fax #    Felicitaws Jack Garza [phone] [address] [address]        Equal Access to Services     [facility] : Hadii hebert ku hadasho Soomaali, waaxda luqadaha, qaybta kaalmada adeegyamaru, erickson morse . So [facility] [phone].    ATENCIÓN: Si habla español, tiene a george disposición servicios gratuitos de asistencia lingüística. Llame al [phone].    We comply with applicable federal civil rights laws and [place] laws. We do not discriminate on the basis of race, color, national origin, age, disability, sex, sexual orientation, or gender identity.            Thank you!     Thank you for choosing [facility]  for your care. Our goal is always to provide you with excellent care. Hearing back from our patients is one way we can continue to improve our services. Please take a few minutes to complete the written survey that you may receive in the mail after your visit with us. Thank you!             Your Updated Medication List - Protect others around you: Learn  how to safely use, store and throw away your medicines at www.disposemymeds.org.          This list is accurate as of 4/25/18  3:24 PM.  Always use your most recent med list.                   Brand Name Dispense Instructions for use Diagnosis    acetaminophen-codeine 300-30 MG per tablet    TYLENOL #3    10 tablet    Take 1 tablet by mouth every 14 days Use 1 tablet every 2 weeks for migraine.  This is a 4 month supply    Migraine without aura and without status migrainosus, not intractable       azithromycin 250 MG tablet    ZITHROMAX    6 tablet    Two tablets first day, then one tablet daily for four days.    Acute bronchitis, unspecified organism       FLUoxetine 20 MG capsule    PROzac    90 capsule    TAKE 1 CAPSULE (10 MG) BY MOUTH DAILY        fluticasone 50 MCG/ACT spray    FLONASE    1 g    Spray 1-2 sprays into both nostrils nightly as needed for rhinitis or allergies    Post-nasal drip       guaiFENesin-codeine 100-10 MG/5ML Soln solution    ROBITUSSIN AC    120 mL    Take 5 mLs by mouth every 4 hours as needed for cough    Acute bronchitis, unspecified organism       IBUPROFEN PO           * traMADol 50 MG tablet    ULTRAM    30 tablet    Take 1 tablet (50 mg) by mouth every 6 hours as needed for moderate pain    Right wrist pain       * traMADol 50 MG tablet    ULTRAM    20 tablet    Take 1 tablet (50 mg) by mouth every 6 hours as needed for severe pain    Radial styloid tenosynovitis       * Notice:  This list has 2 medication(s) that are the same as other medications prescribed for you. Read the directions carefully, and ask your doctor or other care provider to review them with you.

## 2018-04-25 NOTE — LETTER
4/25/2018      RE: Renetta Lauren  [address]  [facility] [address]        Subjective:   Renetta Lauren is a 42 year old female who is here following up on right wrist pain.  She is here wanting to get an EMG.  Not getting stabbing pain any longer, now dull throbbing pain.  Improving in the severity.  Thought maybe carpal tunnel.  Tried to call for EMG order, thought she would come in and see if she could get a carpal tunnel order.  When she tried to schedule there was no order.  Lifting child in the car, car seat, seem different than left hand.  Seems to still look different.  Wearing brace, doing dance for easy wrist work-outs  Testing in office ok, but opening jar is hard.  Moving in at parents.  Left wrist was as bad as right for a while but now that's completely resolved.  Tramadol used- using for pain relief  1.5 years on Tramadol, not affecting energy  Stopped meat, vegan- decreased, anti-inflammitory       Date of injury: NA  Date last seen: Visit date not found  Following Therapeutic Plan: Yes Saw  October 2017  Pain: Improving  Function: Unchanged  Interval History:  Seen by surgery, doing hypnosis CD, research chronic pain    PAST MEDICAL, SOCIAL, SURGICAL AND FAMILY HISTORY: She  has a past medical history of Asthma; Depression (ages 18-21); Depressive disorder (ages 18-21); Miscarriage (Nov 2011); Ulcer (H); and UTI (lower urinary tract infection).  She  has a past surgical history that includes APPENDECTOMY (1991); Operative hysteroscopy with morcellator (N/A, 11/23/2015); and STOMACH SURGERY PROCEDURE UNLISTED (1990).  Her family history includes CANCER in her mother; Coronary Artery Disease in her maternal grandfather; Family History Negative in her father and another family member; Hypertension in her mother; Other Cancer in her mother.  She reports that she has never smoked. She has never used smokeless tobacco. She reports that she drinks alcohol. She reports that she does  "not use illicit drugs.    ALLERGIES: She is allergic to nkda [no known drug allergies].    CURRENT MEDICATIONS: She has a current medication list which includes the following prescription(s): acetaminophen-codeine, azithromycin, fluoxetine, fluticasone, guaifenesin-codeine, ibuprofen, and tramadol.     REVIEW OF SYSTEMS: 10 point review of systems is negative except as noted above.     Exam:   Resp 16  Ht 1.778 m (5' 10\")  Wt 71.7 kg (158 lb)  BMI 22.67 kg/m2           CONSTITUTIONAL: healthy, alert, no distress and cooperative  HEAD: Normocephalic. No masses, lesions, tenderness or abnormalities  SKIN: no suspicious lesions or rashes  GAIT: normal  NEUROLOGIC: Non-focal  PSYCHIATRIC: affect normal/bright and mentation appears normal.    MUSCULOSKELETAL: right wrist pain  WRIST:  Inspection: no swelling  no effusion  Palpation: Tender: flexor tendons, sometimes 3-5 finger  Non-tender: distal radius, distal ulna, scaphoid, lunate  Range of Motion: normal  Strength: no deficits  Special tests: positive Phalen's.  , positive Finkelstein's.  , radial, ulnar and median nerve function intact    ELBOW:  elbow exam : Inspection: no swelling, no ecchymosis, no olecranon bursa swelling  Tender: medial epicondyle  Non-tender: lateral epicondyle and medial epicondyle  Range of Motion: all normal  Strength: elbow strength full  Special tests: normal stability           Assessment/Plan:   Pt is a 43 yo white female with PMHx of right wrist pain presenting with 1.5 years of pain  1. Right wrist pain- DeQuervaines, possible carpal tunnel  EMG ordered  Consider pain clinic, still requires Tramadol for pain  Taking less of it but still have child cares to do, single parent      X-RAY INTERPRETATION:   none    Orquidea Seaman MD    "

## 2018-04-25 NOTE — PROGRESS NOTES
Subjective:   Renetta Lauren is a 42 year old female who is here following up on right wrist pain.  She is here wanting to get an EMG.  Not getting stabbing pain any longer, now dull throbbing pain.  Improving in the severity.  Thought maybe carpal tunnel.  Tried to call for EMG order, thought she would come in and see if she could get a carpal tunnel order.  When she tried to schedule there was no order.  Lifting child in the car, car seat, seem different than left hand.  Seems to still look different.  Wearing brace, doing dance for easy wrist work-outs  Testing in office ok, but opening jar is hard.  Moving in at parents.  Left wrist was as bad as right for a while but now that's completely resolved.  Tramadol used- using for pain relief  1.5 years on Tramadol, not affecting energy  Stopped meat, vegan- decreased, anti-inflammitory       Date of injury: NA  Date last seen: Visit date not found  Following Therapeutic Plan: Yes Saw  October 2017  Pain: Improving  Function: Unchanged  Interval History:  Seen by surgery, doing hypnosis CD, research chronic pain    PAST MEDICAL, SOCIAL, SURGICAL AND FAMILY HISTORY: She  has a past medical history of Asthma; Depression (ages 18-21); Depressive disorder (ages 17-21); Miscarriage (Nov 2011); Ulcer (H); and UTI (lower urinary tract infection).  She  has a past surgical history that includes APPENDECTOMY (1991); Operative hysteroscopy with morcellator (N/A, 11/23/2015); and STOMACH SURGERY PROCEDURE UNLISTED (1990).  Her family history includes CANCER in her mother; Coronary Artery Disease in her maternal grandfather; Family History Negative in her father and another family member; Hypertension in her mother; Other Cancer in her mother.  She reports that she has never smoked. She has never used smokeless tobacco. She reports that she drinks alcohol. She reports that she does not use illicit drugs.    ALLERGIES: She is allergic to nkda [no known drug  "allergies].    CURRENT MEDICATIONS: She has a current medication list which includes the following prescription(s): acetaminophen-codeine, azithromycin, fluoxetine, fluticasone, guaifenesin-codeine, ibuprofen, and tramadol.     REVIEW OF SYSTEMS: 10 point review of systems is negative except as noted above.     Exam:   Resp 16  Ht 1.778 m (5' 10\")  Wt 71.7 kg (158 lb)  BMI 22.67 kg/m2           CONSTITUTIONAL: healthy, alert, no distress and cooperative  HEAD: Normocephalic. No masses, lesions, tenderness or abnormalities  SKIN: no suspicious lesions or rashes  GAIT: normal  NEUROLOGIC: Non-focal  PSYCHIATRIC: affect normal/bright and mentation appears normal.    MUSCULOSKELETAL: right wrist pain  WRIST:  Inspection: no swelling  no effusion  Palpation: Tender: flexor tendons, sometimes 3-5 finger  Non-tender: distal radius, distal ulna, scaphoid, lunate  Range of Motion: normal  Strength: no deficits  Special tests: positive Phalen's.  , positive Finkelstein's.  , radial, ulnar and median nerve function intact    ELBOW:  elbow exam : Inspection: no swelling, no ecchymosis, no olecranon bursa swelling  Tender: medial epicondyle  Non-tender: lateral epicondyle and medial epicondyle  Range of Motion: all normal  Strength: elbow strength full  Special tests: normal stability           Assessment/Plan:   Pt is a 41 yo white female with PMHx of right wrist pain presenting with 1.5 years of pain  1. Right wrist pain- [place], possible carpal tunnel  EMG ordered  Consider pain clinic, still requires Tramadol for pain  Taking less of it but still have child cares to do, single parent      X-RAY INTERPRETATION:   none  "

## 2018-05-01 NOTE — TELEPHONE ENCOUNTER
FUTURE VISIT INFORMATION      FUTURE VISIT INFORMATION:    Date: 5/7/18    Time: 10:20    [place] [facility]  REFERRAL INFORMATION:    Referring provider:  Orquidea Seaman    Referring providers clinic:  [facility]    Reason for visit/diagnosis  radial styloid tenosynovitis    RECORDS REQUESTED FROM:       Clinic name Comments Records Status Imaging Status    Orquidea Seaman referring internal internal                                   RECORDS STATUS

## 2018-05-07 ENCOUNTER — PRE VISIT (OUTPATIENT)
Dept: ANESTHESIOLOGY | Facility: CLINIC | Age: 43
End: 2018-05-07

## 2018-05-07 ENCOUNTER — OFFICE VISIT (OUTPATIENT)
Dept: ANESTHESIOLOGY | Facility: CLINIC | Age: 43
End: 2018-05-07
Payer: COMMERCIAL

## 2018-05-07 VITALS
DIASTOLIC BLOOD PRESSURE: 88 MMHG | RESPIRATION RATE: 16 BRPM | BODY MASS INDEX: 22.62 KG/M2 | SYSTOLIC BLOOD PRESSURE: 129 MMHG | WEIGHT: 158 LBS | HEIGHT: 70 IN | HEART RATE: 82 BPM

## 2018-05-07 DIAGNOSIS — M65.4 RADIAL STYLOID TENOSYNOVITIS: ICD-10-CM

## 2018-05-07 DIAGNOSIS — F11.90 CHRONIC, CONTINUOUS USE OF OPIOIDS: Primary | ICD-10-CM

## 2018-05-07 DIAGNOSIS — M25.531 RIGHT WRIST PAIN: ICD-10-CM

## 2018-05-07 RX ORDER — TRAMADOL HYDROCHLORIDE 50 MG/1
50 TABLET ORAL
Qty: 30 TABLET | Refills: 0 | Status: SHIPPED | OUTPATIENT
Start: 2018-05-07 | End: 2018-06-01

## 2018-05-07 RX ORDER — TRAMADOL HYDROCHLORIDE 50 MG/1
50 TABLET ORAL
Qty: 30 TABLET | Refills: 0 | Status: SHIPPED | OUTPATIENT
Start: 2018-05-07 | End: 2018-09-20

## 2018-05-07 ASSESSMENT — PAIN SCALES - GENERAL: PAINLEVEL: MILD PAIN (2)

## 2018-05-07 ASSESSMENT — ANXIETY QUESTIONNAIRES
5. BEING SO RESTLESS THAT IT IS HARD TO SIT STILL: NOT AT ALL
3. WORRYING TOO MUCH ABOUT DIFFERENT THINGS: NOT AT ALL
4. TROUBLE RELAXING: SEVERAL DAYS
6. BECOMING EASILY ANNOYED OR IRRITABLE: NOT AT ALL
GAD7 TOTAL SCORE: 1
7. FEELING AFRAID AS IF SOMETHING AWFUL MIGHT HAPPEN: NOT AT ALL
GAD7 TOTAL SCORE: 1
1. FEELING NERVOUS, ANXIOUS, OR ON EDGE: NOT AT ALL
2. NOT BEING ABLE TO STOP OR CONTROL WORRYING: NOT AT ALL
GAD7 TOTAL SCORE: 1
7. FEELING AFRAID AS IF SOMETHING AWFUL MIGHT HAPPEN: NOT AT ALL

## 2018-05-07 ASSESSMENT — ENCOUNTER SYMPTOMS
HOT FLASHES: 0
DECREASED LIBIDO: 1

## 2018-05-07 NOTE — NURSING NOTE
Patient stated that she took Tramadol yesterday (05/06/2018) evening.      Natasha Broderick, CMA

## 2018-05-07 NOTE — MR AVS SNAPSHOT
After Visit Summary   5/7/2018    Renetta Luaren    MRN: [id number]           Patient Information     Date Of Birth          1975        Visit Information        Provider Department      5/7/2018 10:20 AM Beverly Kebede MD [facility]        Today's Diagnoses     Chronic, continuous use of opioids    -  1    Right wrist pain        Radial styloid tenosynovitis          Care Instructions    1. Make an appointment with physical therapy for hand therapy and for how to use a TENS unit.     2. Dr. Kebede will prescribe tramadol for you, going forward    3. Urine drug screen, sign contract    4. Referral to health psychology     To make an appointment for health psychology, call any of these providers to make an appointment:    Dr. Jordan Chen [phone]  Dr. Shirin Gonzalez [phone]  Dr. Virginia Odonnell [phone]  Dr. Flor Solorio [phone]  Dr. Ni Garces [phone]  Dr. Puja Marc [phone]      Follow up:    4 weeks    To speak with a nurse, schedule/reschedule/cancel a clinic appointment, or request a medication refill call: [phone]     You can also reach us by [employer]: [email or web address]    For refills, please call on Monday, 1 week before your medication runs out. The doctors are not always in clinic, so this gives us time to get your prescriptions ready.  Please let us know the name of the medication you are requesting a refill of.                                     Follow-ups after your visit        Additional Services     [facility] PT, HAND, AND CHIROPRACTIC REFERRAL       **This order will print in the [facility] Scheduling Office**    Physical Therapy, Hand Therapy and Chiropractic Care are available through:    *[facility]  *[facility]  *[facility]    Call one number to schedule at any of the above locations: [phone].    Your provider has  "referred you to: Hand Therapy    Indication/Reason for Referral: Wrist Pain  Onset of Illness: 2 years  Therapy Orders: Evaluate and Treat  Special Programs: None  Special Request: None    Harvey Gabriel      Additional Comments for the Therapist or Chiropractor: None    Please be aware that coverage of these services is subject to the terms and limitations of your health insurance plan.  Call member services at your health plan with any benefit or coverage questions.      Please bring the following to your appointment:    *Your personal calendar for scheduling future appointments  *Comfortable clothing            PHYSICAL THERAPY REFERRAL       *This therapy referral will be filtered to a centralized scheduling office at [facility] and the patient will receive a call to schedule an appointment at a [place] location most convenient for them. *     [facility] provides Physical Therapy evaluation and treatment and many specialty services across the [place] system.  If requesting a specialty program, please choose from the list below.    If you have not heard from the scheduling office within 2 business days, please call [phone] for all locations, with the exception of [place], please call [phone] and [facility], please call [phone]  Treatment: Evaluation & Treatment  Special Instructions/Modalities: hand therapy   Special Programs: None    Please be aware that coverage of these services is subject to the terms and limitations of your health insurance plan.  Call member services at your health plan with any benefit or coverage questions.      **Note to Provider:  If you are referring outside of [place] for the therapy appointment, please list the name of the location in the \"special instructions\" above, print the referral and give to the patient to schedule the appointment.            PHYSICAL THERAPY REFERRAL       TENS Unit distribution and instruction.   "          PSYCHOLOGY REFERRAL       Your provider has referred you to:      To make an appointment for health psychology, call any of these providers to make an appointmen:    Dr. Jordan Chen [phone]  Dr. Shirin Gonzalez [phone]  Dr. Virginia Odonnell [phone]  Dr. Flor Solorio [phone]  Dr. Ni Garces [phone]  Dr. Puja Marc [phone]    Please be aware that coverage of these services is subject to the terms and limitations of your health insurance plan.  Call member services at your health plan with any benefit or coverage questions.      Please bring the following to your appointment:    >>   Any x-rays, CTs or MRIs which have been performed.  Contact the facility where they were done to arrange for  prior to your scheduled appointment.   >>   List of current medications   >>   This referral request   >>   Any documents/labs given to you for this referral                  Your next 10 appointments already scheduled     May 08, 2018  1:45 PM CDT   (Arrive by 1:30 PM)   EMG with Dipak Lua MD   [facility] ([facility])    [address] [place] [id number] [phone]           Do not use lotions or creams on the area to be tested. If you are on blood thinners (Warfarin, Coumadin, Lovenox, etc), please contact your primary care physician to check if it is safe to stop them 3 days prior to testing. If you have anxiety, please check with your referring physician to obtain anti-anxiety medication for the procedure.              Who to contact     Please call your clinic at [phone] to:    Ask questions about your health    Make or cancel appointments    Discuss your medicines    Learn about your test results    Speak to your doctor            Additional Information About Your Visit        [employer] Information     [employer] gives you secure access to your electronic health record. If you see a primary care provider,  "you can also send messages to your care team and make appointments. If you have questions, please call your primary care clinic.  If you do not have a primary care provider, please call [phone] and they will assist you.      [employer] is an electronic gateway that provides easy, online access to your medical records. With [employer], you can request a clinic appointment, read your test results, renew a prescription or communicate with your care team.     To access your existing account, please contact your [facility] or call [phone] for assistance.        Care EveryWhere ID     This is your Care EveryWhere ID. This could be used by other organizations to access your [facility] medical records  [id number]        Your Vitals Were     Pulse Respirations Height BMI (Body Mass Index)          82 16 1.778 m (5' 10\") 22.67 kg/m2         Blood Pressure from Last 3 Encounters:   05/07/18 129/88   04/05/18 114/77   03/20/18 128/72    Weight from Last 3 Encounters:   05/07/18 71.7 kg (158 lb)   04/25/18 71.7 kg (158 lb)   04/05/18 71.8 kg (158 lb 6.4 oz)              We Performed the Following     Drug Screen Comprehensive , Urine with Reported Meds (Pain Care Package)     [facility], HAND, AND CHIROPRACTIC REFERRAL     PHYSICAL THERAPY REFERRAL     PHYSICAL THERAPY REFERRAL     PSYCHOLOGY REFERRAL          Today's Medication Changes          These changes are accurate as of 5/7/18 11:35 AM.  If you have any questions, ask your nurse or doctor.               Start taking these medicines.        Dose/Directions    order for DME   Used for:  Radial styloid tenosynovitis   Started by:  Beverly Kebede MD        Equipment being ordered: TENS   Quantity:  1 Device   Refills:  0         These medicines have changed or have updated prescriptions.        Dose/Directions    * traMADol 50 MG tablet   Commonly known as:  ULTRAM   This may have changed:  when to take this   Used for:  Right wrist " pain   Changed by:  Beverly Kebede MD        Dose:  50 mg   Take 1 tablet (50 mg) by mouth nightly as needed for moderate pain   Quantity:  30 tablet   Refills:  0       * traMADol 50 MG tablet   Commonly known as:  ULTRAM   This may have changed:  when to take this   Used for:  Radial styloid tenosynovitis   Changed by:  Beverly Kebede MD        Dose:  50 mg   Take 1 tablet (50 mg) by mouth nightly as needed for severe pain   Quantity:  30 tablet   Refills:  0       * Notice:  This list has 2 medication(s) that are the same as other medications prescribed for you. Read the directions carefully, and ask your doctor or other care provider to review them with you.         Where to get your medicines      Some of these will need a paper prescription and others can be bought over the counter.  Ask your nurse if you have questions.     Bring a paper prescription for each of these medications     order for DME    traMADol 50 MG tablet    traMADol 50 MG tablet               Information about OPIOIDS     PRESCRIPTION OPIOIDS: WHAT YOU NEED TO KNOW   You have a prescription for an opioid (narcotic) pain medicine. Opioids can cause addiction. If you have a history of chemical dependency of any type, you are at a higher risk of becoming addicted to opioids. Only take this medicine after all other options have been tried. Take it for as short a time and as few doses as possible.     Do not:    Drive. If you drive while taking these medicines, you could be arrested for driving under the influence (DUI).    Operate heavy machinery    Do any other dangerous activities while taking these medicines.     Drink any alcohol while taking these medicines.      Take with any other medicines that contain acetaminophen. Read all labels carefully. Look for the word  acetaminophen  or  Tylenol.  Ask your pharmacist if you have questions or are unsure.    Store your pills in a secure place, locked if possible. We will not  replace any lost or stolen medicine. If you don t finish your medicine, please throw away (dispose) as directed by your pharmacist. The [place] Pollution Control Agency has more information about safe disposal: https://www.pca.[facility].mn.us/living-green/managing-unwanted-medications    All opioids tend to cause constipation. Drink plenty of water and eat foods that have a lot of fiber, such as fruits, vegetables, prune juice, apple juice and high-fiber cereal. Take a laxative (Miralax, milk of magnesia, Colace, Senna) if you don t move your bowels at least every other day.          Primary Care Provider Office Phone # Fax #    Kendall Jack Garza [phone] [address] [address]        Equal Access to Services     GAYATHRI BACON : Joel Suarez, waaxmaru phipps, qaybta kaalrhiannon douglass, erickson morse . So [facility] [phone].    ATENCIÓN: Si habla español, tiene a george disposición servicios gratuitos de asistencia lingüística. Laura al [phone].    We comply with applicable federal civil rights laws and [place] laws. We do not discriminate on the basis of race, color, national origin, age, disability, sex, sexual orientation, or gender identity.            Thank you!     Thank you for choosing [facility]  for your care. Our goal is always to provide you with excellent care. Hearing back from our patients is one way we can continue to improve our services. Please take a few minutes to complete the written survey that you may receive in the mail after your visit with us. Thank you!             Your Updated Medication List - Protect others around you: Learn how to safely use, store and throw away your medicines at www.disposemymeds.org.          This list is accurate as of 5/7/18 11:35 AM.  Always use your most recent med list.                   Brand Name Dispense Instructions for use  Diagnosis    acetaminophen-codeine 300-30 MG per tablet    TYLENOL #3    10 tablet    Take 1 tablet by mouth every 14 days Use 1 tablet every 2 weeks for migraine.  This is a 4 month supply    Migraine without aura and without status migrainosus, not intractable       azithromycin 250 MG tablet    ZITHROMAX    6 tablet    Two tablets first day, then one tablet daily for four days.    Acute bronchitis, unspecified organism       FLUoxetine 20 MG capsule    PROzac    90 capsule    TAKE 1 CAPSULE (10 MG) BY MOUTH DAILY        fluticasone 50 MCG/ACT spray    FLONASE    1 g    Spray 1-2 sprays into both nostrils nightly as needed for rhinitis or allergies    Post-nasal drip       guaiFENesin-codeine 100-10 MG/5ML Soln solution    ROBITUSSIN AC    120 mL    Take 5 mLs by mouth every 4 hours as needed for cough    Acute bronchitis, unspecified organism       IBUPROFEN PO           order for DME     1 Device    Equipment being ordered: TENS    Radial styloid tenosynovitis       * traMADol 50 MG tablet    ULTRAM    30 tablet    Take 1 tablet (50 mg) by mouth nightly as needed for moderate pain    Right wrist pain       * traMADol 50 MG tablet    ULTRAM    30 tablet    Take 1 tablet (50 mg) by mouth nightly as needed for severe pain    Radial styloid tenosynovitis       * Notice:  This list has 2 medication(s) that are the same as other medications prescribed for you. Read the directions carefully, and ask your doctor or other care provider to review them with you.

## 2018-05-07 NOTE — PROGRESS NOTES
[facility] Adult Chronic Pain Service Outpatient Consultation    REASON FOR PAIN CONSULTATION:  Renetta Lauren is a 42 year old female I am seeing in consultation at the request of Dr. Seaman for evaluation and recommendations for her wrist pain.     PAIN HISTORY:   She developed right wrist pain about 2 years ago when her son was born.  She was hospitalized for 3 months antepartum for  labor and on bedrest for 3 months.  She was then sleeping in the NICU for a month after he son was born.  There was no specific injury or event leading to the wrist pain.   Pain ranges in intensity from 5 to 10, and averages 6 on the zero to ten numerical rating scale  Quality of the pain is sharp and stabbing  Aggravating factors include lifting her child and car seat  Relieving factors include tramadol, seems to be better during the day when she is busy  IMPACT OF PAIN: continuously uncomfortable in evenings when she is trying to relax/sleep, painful to lift and do activities required for caring for small child     DIAGNOSTIC TESTS:   Xray of the right wrist showing no abnormality- May, 2017  MRI upper extremity ordered but not completed    PAST PAIN TREATMENT:   PT - helpful   Brace - helpful   Tramadol 50 mg at night   Diet changes, now a vegetarian, which helps     CURRENT MEDICATIONS:   Current Outpatient Prescriptions Ordered in [facility]   Medication Sig Dispense Refill     acetaminophen-codeine (TYLENOL #3) 300-30 MG per tablet Take 1 tablet by mouth every 14 days Use 1 tablet every 2 weeks for migraine.  This is a 4 month supply 10 tablet 0     azithromycin (ZITHROMAX) 250 MG tablet Two tablets first day, then one tablet daily for four days. 6 tablet 0     FLUoxetine (PROZAC) 20 MG capsule TAKE 1 CAPSULE (10 MG) BY MOUTH DAILY 90 capsule 1     fluticasone (FLONASE) 50 MCG/ACT nasal spray Spray 1-2 sprays into both nostrils nightly as needed for rhinitis or allergies 1 g 3     guaiFENesin-codeine (ROBITUSSIN AC) 100-10 MG/5ML  "SOLN solution Take 5 mLs by mouth every 4 hours as needed for cough 120 mL 0     IBUPROFEN PO        traMADol (ULTRAM) 50 MG tablet Take 1 tablet (50 mg) by mouth every 6 hours as needed for moderate pain 30 tablet 3     traMADol (ULTRAM) 50 MG tablet Take 1 tablet (50 mg) by mouth every 6 hours as needed for severe pain 20 tablet 0     No current Epic-ordered facility-administered medications on file.        ALLERGIES:   Allergies   Allergen Reactions     Nkda [No Known Drug Allergies]        PAST MEDICAL AND PSYCHIATRIC HISTORY:  has a past medical history of Asthma; Depression (ages 18-21); Depressive disorder (ages 18-21); Miscarriage (Nov 2011); Ulcer (H); and UTI (lower urinary tract infection).    PAST SURGICAL HISTORY:  has a past surgical history that includes APPENDECTOMY (1991); Operative hysteroscopy with morcellator (N/A, 11/23/2015); and STOMACH SURGERY PROCEDURE UNLISTED (1990).    SOCIAL HISTORY: Please see the separate psychosocial evaluation by the health psychologist for additional information.    REVIEW OF SYSTEMS: Please refer to the patient's health questionnaire which I reviewed in detail with her.  This review covered 13 bodily systems.     PHYSICAL EXAMINATION:  VITAL SIGNS:  Blood pressure 129/88, pulse 82, resp. rate 16, height 1.778 m (5' 10\"), weight 71.7 kg (158 lb), not currently breastfeeding.  CONSTITUTIONAL/GENERAL APPEARANCE/:    In no apparent distress   No pain behaviors  EYES:   Extra ocular movements intact   No scleral icterus  ENT/NECK:   Neck range of motion full and normal for extension, flexion, bilateral rotation, and bilateral tilt.   Neck supple  RESPIRATORY:   No respiratory distress  CARDIOVASCULAR:   Regular rate, rhythm  MUSCULOSKELETAL/BACK/SPINE/EXTREMITIES:   Neck range of motion full and normal for extension, flexion, bilateral rotation, and bilateral tilt.   Lumbar range of motion full and normal for extension, flexion, bilateral rotation, and bilateral tilt. "   Full range of motion of all 4 extremities, no swelling or erythema of joints   Right radial styloid is enlarged and tender to palpation. No erythema or swelling, full range of motion of bilateral wrists.   NEURO:    AAOx3  Cranial nerves II-XII grossly intact   Moves all 4 extremities equally and normally   SKIN/VASCULAR/AUTONOMIC EXAM:   1. Rashes: none noted  2. Lesions: none  3. Skin temperature asymmetry: none  4. Skin color asymmetry: none  PSYCHIATRIC/BEHAVIORAL/OBSERVATIONS:    Judgment/Insight good   Orientation good   Memory intact, and is a decent historian     Mood and affect appear appropriate    ASSESSMENT:   Right wrist tenosynovitis   Chronic continuous use of opiates for therapeutic purposes       TREATMENT RECOMMENDATIONS/PLAN:  1. Medications: She would like to decrease her tramadol intake to twice per week from about 5-7 times per week.  I agree with this plan, and I will refill her tramadol in the meantime.  I will for now give her a supply of #30 pills, but she will try to take less than once per day.  She will return to clinic in 30 days and we will assess how many pills she needed.  She will bring in left over pills.  We will obtain a UDS today and have her sign an opiate contract with our clinic today.    She would rather not replace the tramadol with any other non-opiate medications.    2. Procedures: She would like to avoid any procedural interventions.  She will be given an order for TENS unit and referral for instructional edu.   3. Images/tests: none additional   4. Referrals: referrals made to PT hand therapy, and one-time referral to PT for TENS unit instruction.  Referral also made to Health Psychology for training in cognitive behavioral therapy and mindfulness.   5. PT/OT, exercise program: PT hand therapy referral   5. Follow up: 30 days     Thank you for the opportunity to care for this patient today,    Beverly Kebede MD    Answers for HPI/ROS submitted by the patient on  5/7/2018   General Symptoms: No  Skin Symptoms: No  HENT Symptoms: No  EYE SYMPTOMS: No  HEART SYMPTOMS: No  LUNG SYMPTOMS: No  INTESTINAL SYMPTOMS: No  URINARY SYMPTOMS: No  GYNECOLOGIC SYMPTOMS: Yes  BREAST SYMPTOMS: No  SKELETAL SYMPTOMS: No  BLOOD SYMPTOMS: No  NERVOUS SYSTEM SYMPTOMS: No  MENTAL HEALTH SYMPTOMS: No  Bleeding or spotting between periods: No  Heavy or painful periods: Yes  Irregular periods: No  Vaginal discharge: No  Hot flashes: No  Vaginal dryness: No  Genital ulcers: No  Reduced libido: Yes  Painful intercourse: No  Difficulty with sexual arousal: No  ANDREW 7 TOTAL SCORE: 1  PHQ-2 Score: 0

## 2018-05-07 NOTE — PATIENT INSTRUCTIONS
1. Make an appointment with physical therapy for hand therapy and for how to use a TENS unit.     2. Dr. Kebede will prescribe tramadol for you, going forward    3. Urine drug screen, sign contract    4. Referral to health psychology     To make an appointment for health psychology, call any of these providers to make an appointment:    Dr. Jordan Chen [phone]  Dr. Shirin Gonzalez [phone]  Dr. Virginia Odonnell [phone]  Dr. Flor Solorio [phone]  Dr. Ni Garces [phone]  Dr. Puja Marc [phone]      Follow up:    4 weeks    To speak with a nurse, schedule/reschedule/cancel a clinic appointment, or request a medication refill call: [phone]     You can also reach us by "[employer]: [email or web address]    For refills, please call on Monday, 1 week before your medication runs out. The doctors are not always in clinic, so this gives us time to get your prescriptions ready.  Please let us know the name of the medication you are requesting a refill of.

## 2018-05-07 NOTE — LETTER
2018       RE: Renetta Lauren  [address]  [place] [facility] [address]     Dear Colleague,    Thank you for referring your patient, Renetta Lauren, to the [facility] CL[facility] FOR COMPREHENSIVE PAIN MANAGEMENT at [facility]. Please see a copy of my visit note below.    [facility] Adult Chronic Pain Service Outpatient Consultation    REASON FOR PAIN CONSULTATION:  Renetta Lauren is a 42 year old female I am seeing in consultation at the request of Dr. Seaman for evaluation and recommendations for her wrist pain.     PAIN HISTORY:   She developed right wrist pain about 2 years ago when her son was born.  She was hospitalized for 3 months antepartum for  labor and on bedrest for 3 months.  She was then sleeping in the NICU for a month after he son was born.  There was no specific injury or event leading to the wrist pain.   Pain ranges in intensity from 5 to 10, and averages 6 on the zero to ten numerical rating scale  Quality of the pain is sharp and stabbing  Aggravating factors include lifting her child and car seat  Relieving factors include tramadol, seems to be better during the day when she is busy  IMPACT OF PAIN: continuously uncomfortable in evenings when she is trying to relax/sleep, painful to lift and do activities required for caring for small child     DIAGNOSTIC TESTS:   Xray of the right wrist showing no abnormality- May, 2017  MRI upper extremity ordered but not completed    PAST PAIN TREATMENT:   PT - helpful   Brace - helpful   Tramadol 50 mg at night   Diet changes, now a vegetarian, which helps     CURRENT MEDICATIONS:   Current Outpatient Prescriptions Ordered in [facility]   Medication Sig Dispense Refill     acetaminophen-codeine (TYLENOL #3) 300-30 MG per tablet Take 1 tablet by mouth every 14 days Use 1 tablet every 2 weeks for migraine.  This is a 4 month supply 10 tablet 0     azithromycin (ZITHROMAX) 250 MG tablet Two tablets first day, then one tablet  "daily for four days. 6 tablet 0     FLUoxetine (PROZAC) 20 MG capsule TAKE 1 CAPSULE (10 MG) BY MOUTH DAILY 90 capsule 1     fluticasone (FLONASE) 50 MCG/ACT nasal spray Spray 1-2 sprays into both nostrils nightly as needed for rhinitis or allergies 1 g 3     guaiFENesin-codeine (ROBITUSSIN AC) 100-10 MG/5ML SOLN solution Take 5 mLs by mouth every 4 hours as needed for cough 120 mL 0     IBUPROFEN PO        traMADol (ULTRAM) 50 MG tablet Take 1 tablet (50 mg) by mouth every 6 hours as needed for moderate pain 30 tablet 3     traMADol (ULTRAM) 50 MG tablet Take 1 tablet (50 mg) by mouth every 6 hours as needed for severe pain 20 tablet 0     No current Epic-ordered facility-administered medications on file.        ALLERGIES:   Allergies   Allergen Reactions     Nkda [No Known Drug Allergies]        PAST MEDICAL AND PSYCHIATRIC HISTORY:  has a past medical history of Asthma; Depression (ages 18-21); Depressive disorder (ages 18-21); Miscarriage (Nov 2011); Ulcer (H); and UTI (lower urinary tract infection).    PAST SURGICAL HISTORY:  has a past surgical history that includes APPENDECTOMY (1991); Operative hysteroscopy with morcellator (N/A, 11/23/2015); and STOMACH SURGERY PROCEDURE UNLISTED (1990).    SOCIAL HISTORY: Please see the separate psychosocial evaluation by the health psychologist for additional information.    REVIEW OF SYSTEMS: Please refer to the patient's health questionnaire which I reviewed in detail with her.  This review covered 13 bodily systems.     PHYSICAL EXAMINATION:  VITAL SIGNS:  Blood pressure 129/88, pulse 82, resp. rate 16, height 1.778 m (5' 10\"), weight 71.7 kg (158 lb), not currently breastfeeding.  CONSTITUTIONAL/GENERAL APPEARANCE/:    In no apparent distress   No pain behaviors  EYES:   Extra ocular movements intact   No scleral icterus  ENT/NECK:   Neck range of motion full and normal for extension, flexion, bilateral rotation, and bilateral tilt.   Neck supple  RESPIRATORY:   No " respiratory distress  CARDIOVASCULAR:   Regular rate, rhythm  MUSCULOSKELETAL/BACK/SPINE/EXTREMITIES:   Neck range of motion full and normal for extension, flexion, bilateral rotation, and bilateral tilt.   Lumbar range of motion full and normal for extension, flexion, bilateral rotation, and bilateral tilt.   Full range of motion of all 4 extremities, no swelling or erythema of joints   Right radial styloid is enlarged and tender to palpation. No erythema or swelling, full range of motion of bilateral wrists.   NEURO:    AAOx3  Cranial nerves II-XII grossly intact   Moves all 4 extremities equally and normally   SKIN/VASCULAR/AUTONOMIC EXAM:   1. Rashes: none noted  2. Lesions: none  3. Skin temperature asymmetry: none  4. Skin color asymmetry: none  PSYCHIATRIC/BEHAVIORAL/OBSERVATIONS:    Judgment/Insight good   Orientation good   Memory intact, and is a decent historian     Mood and affect appear appropriate    ASSESSMENT:   Right wrist tenosynovitis   Chronic continuous use of opiates for therapeutic purposes       TREATMENT RECOMMENDATIONS/PLAN:  1. Medications: She would like to decrease her tramadol intake to twice per week from about 5-7 times per week.  I agree with this plan, and I will refill her tramadol in the meantime.  I will for now give her a supply of #30 pills, but she will try to take less than once per day.  She will return to clinic in 30 days and we will assess how many pills she needed.  She will bring in left over pills.  We will obtain a UDS today and have her sign an opiate contract with our clinic today.    She would rather not replace the tramadol with any other non-opiate medications.    2. Procedures: She would like to avoid any procedural interventions.  She will be given an order for TENS unit and referral for instructional edu.   3. Images/tests: none additional   4. Referrals: referrals made to PT hand therapy, and one-time referral to PT for TENS unit instruction.  Referral also  made to Health Psychology for training in cognitive behavioral therapy and mindfulness.   5. PT/OT, exercise program: PT hand therapy referral   5. Follow up: 30 days     Thank you for the opportunity to care for this patient today,      Again, thank you for allowing me to participate in the care of your patient.      Sincerely,    Beverly Kebede MD

## 2018-05-08 ENCOUNTER — OFFICE VISIT (OUTPATIENT)
Dept: NEUROLOGY | Facility: CLINIC | Age: 43
End: 2018-05-08
Payer: COMMERCIAL

## 2018-05-08 DIAGNOSIS — G56.21 ULNAR NEUROPATHY AT ELBOW, RIGHT: ICD-10-CM

## 2018-05-08 DIAGNOSIS — M65.4 RADIAL STYLOID TENOSYNOVITIS: ICD-10-CM

## 2018-05-08 DIAGNOSIS — M25.531 RIGHT WRIST PAIN: Primary | ICD-10-CM

## 2018-05-08 ASSESSMENT — ANXIETY QUESTIONNAIRES: GAD7 TOTAL SCORE: 1

## 2018-05-08 NOTE — PROGRESS NOTES
[facility]  Electrodiagnostic Laboratory    Nerve Conduction & EMG Report          Patient: Renetta Lauren  YOB: 1975  Patient ID: [id number]  Age: 42 Years 10 Months  Gender: Female      Referring Physician: Orquidea Seaman MD    History & Examination:    42 year old woman with pain at the right wrist attributed to tenosynovitis, and intermittent numbness of digits 3-5 of the right hand, and occasionally the medial forearm as well. Query entrapment neuropathies of the right upper extremity.     Techniques:    Motor and sensory conduction studies were done with surface recording electrodes. EMG was done with a concentric needle electrode.     Results:    Right median, ulnar, dorsal ulnar, and bilateral medial antebrachial cutaneous antidromic sensory NCSs were normal. Right median and ulnar orthodromic mixed NCSs done with stimulation at the palm were normal. Right median and ulnar (recorded from the FDI) motor NCSs were normal. Right ulnar motor NCS recorded from the ADM showed normal distal latency and CMAP amplitudes, normal conduction velocity at the forearm, and mildly attenuated conduction velocity across the elbow. EMG of right FDI, APB, and extensor indicis was normal.     Interpretation:    Abnormal study. There is electrodiagnostic evidence of a very mild right ulnar neuropathy at the elbow. There is NO electrodiagnostic evidence of right carpal tunnel syndrome, lower trunk or medial cord brachial plexopathy, or C8 radiculopathy.     EMG Physician:    Dipak Lua MD            Sensory NCS      Nerve / Sites Rec. Site Onset Peak Ref. NP Amp Ref. PP Amp Dist Red Ref. Temp     ms ms ms  V  V  V cm m/s m/s  C   R MEDIAN - Dig II Anti      Wrist Dig II 2.29 3.18  48.8 10.0 84.1 14 61.1 48.0 32.9   R ULNAR - Dig V Anti      Wrist Dig V 2.19 2.97  23.7 8.0 34.7 12.5 57.1 48.0 32.9   R MED CUT N FORE      Elbow Forearm 1.88 2.45 3.30 10.8  17.4 12 64.0  33.1   L MED CUT N  FORE      Elbow Forearm 1.98 2.45 3.30 8.5  21.2 12 60.6  29.7   R MEDIAN - Ulnar - Palmar      Median Wrist 1.82 2.03 2.40 34.8  104.0 8 43.9  32.9      Ulnar Wrist 1.41 1.93 2.40 34.5  41.1 8 56.9  32.9   R ULNAR - Dorsal      Wrist Dorsum 1.72 2.45 3.00 28.4  53.9 10 58.2  32.2       Motor NCS      Nerve / Sites Rec. Site Lat Ref. Amp Ref. Rel Amp Dist Red Ref. Dur. Area Temp.     ms ms mV mV % cm m/s m/s ms %  C   R MEDIAN - APB      Wrist APB 3.44 4.40 7.1 5.0 100 8   4.48 100 33.1      Elbow APB 7.50  6.8  96.2 23.5 57.8 48.0 4.37 96.4 33.1   R ULNAR - ADM      Wrist ADM 2.81 3.50 8.9 5.0 100 8   7.81 100 32.9      B.Elbow ADM 5.83  8.5  95.7 19.5 64.6 48.0 7.97 97.3 32.9      A.Elbow ADM 7.92  8.3  93.8 9 43.2 48.0 8.02 98.1 32.9   R ULNAR - FDI      Wrist FDI 3.44  17.1  100    5.21 100 32.7      B.Elbow FDI 6.88  17.2  101 19.5 56.7  5.31 101 32.7      A.Elbow FDI 8.49  18.2  107 9 55.7  5.63 106 32.9       EMG Summary Table     Spontaneous MUAP Recruitment    IA Fib PSW Fasc H.F. Amp Dur. PPP Pattern   R. FIRST D INTEROSS N None None None None N N None Normal   R. EXT INDICIS N None None None None N N None Normal   R. ABD POLL BREVIS N None None None None N N None Normal

## 2018-05-08 NOTE — MR AVS SNAPSHOT
After Visit Summary   5/8/2018    Renetta Lauren    MRN: [id number]           Patient Information     Date Of Birth          1975        Visit Information        Provider Department      5/8/2018 1:45 PM Dipak Lua MD [facility] EMG        Today's Diagnoses     Right wrist pain    -  1    Radial styloid tenosynovitis        Ulnar neuropathy at elbow, right           Follow-ups after your visit        Your next 10 appointments already scheduled     Jun 01, 2018  2:30 PM CDT   (Arrive by 2:15 PM)   Return Visit with Beverly Kebede MD   [facility] for Comprehensive Pain Management ([facility])    [address]  4th Floor  [place] [address] [phone]              Future tests that were ordered for you today     Open Future Orders        Priority Expected Expires Ordered    Needle EMG Each Extremity w/Paraspinal Area Limited (74680) Routine  5/8/2019 5/8/2018            Who to contact     Please call your clinic at [phone] to:    Ask questions about your health    Make or cancel appointments    Discuss your medicines    Learn about your test results    Speak to your doctor            Additional Information About Your Visit        MyChart Information     [employer] gives you secure access to your electronic health record. If you see a primary care provider, you can also send messages to your care team and make appointments. If you have questions, please call your primary care clinic.  If you do not have a primary care provider, please call [phone] and they will assist you.      [employer] is an electronic gateway that provides easy, online access to your medical records. With [employer], you can request a clinic appointment, read your test results, renew a prescription or communicate with your care team.     To access your existing account, please contact your [facility] or call [phone] for  assistance.        Care EveryWhere ID     This is your Care EveryWhere ID. This could be used by other organizations to access your [facility] medical records  [id number]         Blood Pressure from Last 3 Encounters:   05/07/18 129/88   04/05/18 114/77   03/20/18 128/72    Weight from Last 3 Encounters:   05/07/18 71.7 kg (158 lb)   04/25/18 71.7 kg (158 lb)   04/05/18 71.8 kg (158 lb 6.4 oz)              We Performed the Following     EMG ([facility])     HC NCS MOTOR W OR W/O F-WAVE, 7 OR 8        Primary Care Provider Office Phone # Fax #    [facility] Jack Garza [phone] [address]  [place] [address]        Equal Access to Services     [facility]: Hadii aad nivia hadasho Sominh, waaxda luqadaha, qaybta kaalmada rafat, erickson morse . So [facility] [phone].    ATENCIÓN: Si habla español, tiene a george disposición servicios gratuitos de asistencia lingüística. Laura al [phone].    We comply with applicable federal civil rights laws and [place] laws. We do not discriminate on the basis of race, color, national origin, age, disability, sex, sexual orientation, or gender identity.            Thank you!     Thank you for choosing [facility]  for your care. Our goal is always to provide you with excellent care. Hearing back from our patients is one way we can continue to improve our services. Please take a few minutes to complete the written survey that you may receive in the mail after your visit with us. Thank you!             Your Updated Medication List - Protect others around you: Learn how to safely use, store and throw away your medicines at www.disposemymeds.org.          This list is accurate as of 5/8/18  3:25 PM.  Always use your most recent med list.                   Brand Name Dispense Instructions for use Diagnosis    acetaminophen-codeine 300-30 MG per tablet    TYLENOL #3    10 tablet    Take 1 tablet by mouth every 14 days  Use 1 tablet every 2 weeks for migraine.  This is a 4 month supply    Migraine without aura and without status migrainosus, not intractable       azithromycin 250 MG tablet    ZITHROMAX    6 tablet    Two tablets first day, then one tablet daily for four days.    Acute bronchitis, unspecified organism       FLUoxetine 20 MG capsule    PROzac    90 capsule    TAKE 1 CAPSULE (10 MG) BY MOUTH DAILY        fluticasone 50 MCG/ACT spray    FLONASE    1 g    Spray 1-2 sprays into both nostrils nightly as needed for rhinitis or allergies    Post-nasal drip       guaiFENesin-codeine 100-10 MG/5ML Soln solution    ROBITUSSIN AC    120 mL    Take 5 mLs by mouth every 4 hours as needed for cough    Acute bronchitis, unspecified organism       IBUPROFEN PO           order for DME     1 Device    Equipment being ordered: TENS    Radial styloid tenosynovitis       * traMADol 50 MG tablet    ULTRAM    30 tablet    Take 1 tablet (50 mg) by mouth nightly as needed for moderate pain    Right wrist pain       * traMADol 50 MG tablet    ULTRAM    30 tablet    Take 1 tablet (50 mg) by mouth nightly as needed for severe pain    Radial styloid tenosynovitis       * Notice:  This list has 2 medication(s) that are the same as other medications prescribed for you. Read the directions carefully, and ask your doctor or other care provider to review them with you.

## 2018-05-08 NOTE — LETTER
5/8/2018       RE: Renetta Lauren  [address]  [facility] [address]     Dear Colleague,    Thank you for referring your patient, Renetta Lauren, to the [facility] EMG at [facility]. Please see a copy of my visit note below.        [facility]  Electrodiagnostic Laboratory    Nerve Conduction & EMG Report          Patient: Renetta Lauren  YOB: 1975  Patient ID: [id number]  Age: 42 Years 10 Months  Gender: Female      Referring Physician: Orquidea Seaman MD    History & Examination:    42 year old woman with pain at the right wrist attributed to tenosynovitis, and intermittent numbness of digits 3-5 of the right hand, and occasionally the medial forearm as well. Query entrapment neuropathies of the right upper extremity.     Techniques:    Motor and sensory conduction studies were done with surface recording electrodes. EMG was done with a concentric needle electrode.     Results:    Right median, ulnar, dorsal ulnar, and bilateral medial antebrachial cutaneous antidromic sensory NCSs were normal. Right median and ulnar orthodromic mixed NCSs done with stimulation at the palm were normal. Right median and ulnar (recorded from the FDI) motor NCSs were normal. Right ulnar motor NCS recorded from the ADM showed normal distal latency and CMAP amplitudes, normal conduction velocity at the forearm, and mildly attenuated conduction velocity across the elbow. EMG of right FDI, APB, and extensor indicis was normal.     Interpretation:    Abnormal study. There is electrodiagnostic evidence of a very mild right ulnar neuropathy at the elbow. There is NO electrodiagnostic evidence of right carpal tunnel syndrome, lower trunk or medial cord brachial plexopathy, or C8 radiculopathy.     EMG Physician:    Dipak Lua MD            Sensory NCS      Nerve / Sites Rec. Site Onset Peak Ref. NP Amp Ref. PP Amp Dist Red Ref. Temp     ms ms ms  V  V  V cm m/s  m/s  C   R MEDIAN - Dig II Anti      Wrist Dig II 2.29 3.18  48.8 10.0 84.1 14 61.1 48.0 32.9   R ULNAR - Dig V Anti      Wrist Dig V 2.19 2.97  23.7 8.0 34.7 12.5 57.1 48.0 32.9   R MED CUT N FORE      Elbow Forearm 1.88 2.45 3.30 10.8  17.4 12 64.0  33.1   L MED CUT N FORE      Elbow Forearm 1.98 2.45 3.30 8.5  21.2 12 60.6  29.7   R MEDIAN - Ulnar - Palmar      Median Wrist 1.82 2.03 2.40 34.8  104.0 8 43.9  32.9      Ulnar Wrist 1.41 1.93 2.40 34.5  41.1 8 56.9  32.9   R ULNAR - Dorsal      Wrist Dorsum 1.72 2.45 3.00 28.4  53.9 10 58.2  32.2       Motor NCS      Nerve / Sites Rec. Site Lat Ref. Amp Ref. Rel Amp Dist Red Ref. Dur. Area Temp.     ms ms mV mV % cm m/s m/s ms %  C   R MEDIAN - APB      Wrist APB 3.44 4.40 7.1 5.0 100 8   4.48 100 33.1      Elbow APB 7.50  6.8  96.2 23.5 57.8 48.0 4.37 96.4 33.1   R ULNAR - ADM      Wrist ADM 2.81 3.50 8.9 5.0 100 8   7.81 100 32.9      B.Elbow ADM 5.83  8.5  95.7 19.5 64.6 48.0 7.97 97.3 32.9      A.Elbow ADM 7.92  8.3  93.8 9 43.2 48.0 8.02 98.1 32.9   R ULNAR - FDI      Wrist FDI 3.44  17.1  100    5.21 100 32.7      B.Elbow FDI 6.88  17.2  101 19.5 56.7  5.31 101 32.7      A.Elbow FDI 8.49  18.2  107 9 55.7  5.63 106 32.9       EMG Summary Table     Spontaneous MUAP Recruitment    IA Fib PSW Fasc H.F. Amp Dur. PPP Pattern   R. FIRST D INTEROSS N None None None None N N None Normal   R. EXT INDICIS N None None None None N N None Normal   R. ABD POLL BREVIS N None None None None N N None Normal                              Again, thank you for allowing me to participate in the care of your patient.      Sincerely,    Dipak Lua MD

## 2018-05-10 LAB — PAIN DRUG SCR UR W RPTD MEDS: NORMAL

## 2018-06-01 ENCOUNTER — OFFICE VISIT (OUTPATIENT)
Dept: ANESTHESIOLOGY | Facility: CLINIC | Age: 43
End: 2018-06-01
Payer: COMMERCIAL

## 2018-06-01 VITALS
WEIGHT: 158 LBS | RESPIRATION RATE: 16 BRPM | BODY MASS INDEX: 22.62 KG/M2 | HEART RATE: 59 BPM | DIASTOLIC BLOOD PRESSURE: 81 MMHG | SYSTOLIC BLOOD PRESSURE: 121 MMHG | HEIGHT: 70 IN

## 2018-06-01 DIAGNOSIS — M65.4 RADIAL STYLOID TENOSYNOVITIS: ICD-10-CM

## 2018-06-01 DIAGNOSIS — M25.531 RIGHT WRIST PAIN: ICD-10-CM

## 2018-06-01 RX ORDER — TRAMADOL HYDROCHLORIDE 50 MG/1
50 TABLET ORAL
Qty: 30 TABLET | Refills: 0 | Status: SHIPPED | OUTPATIENT
Start: 2018-06-01 | End: 2018-06-22

## 2018-06-01 ASSESSMENT — ANXIETY QUESTIONNAIRES
6. BECOMING EASILY ANNOYED OR IRRITABLE: NOT AT ALL
GAD7 TOTAL SCORE: 0
1. FEELING NERVOUS, ANXIOUS, OR ON EDGE: NOT AT ALL
GAD7 TOTAL SCORE: 0
3. WORRYING TOO MUCH ABOUT DIFFERENT THINGS: NOT AT ALL
5. BEING SO RESTLESS THAT IT IS HARD TO SIT STILL: NOT AT ALL
7. FEELING AFRAID AS IF SOMETHING AWFUL MIGHT HAPPEN: NOT AT ALL
GAD7 TOTAL SCORE: 0
7. FEELING AFRAID AS IF SOMETHING AWFUL MIGHT HAPPEN: NOT AT ALL
2. NOT BEING ABLE TO STOP OR CONTROL WORRYING: NOT AT ALL
4. TROUBLE RELAXING: NOT AT ALL

## 2018-06-01 ASSESSMENT — PAIN SCALES - GENERAL: PAINLEVEL: MILD PAIN (3)

## 2018-06-01 NOTE — PROGRESS NOTES
[facility] Adult Chronic Pain Service Outpatient Consultation    REASON FOR PAIN CONSULTATION:  Renetta Lauren is a 42 year old female I am seeing in consultation at the request of Dr. Seaman for evaluation and recommendations for her wrist pain.     PAIN HISTORY:   She developed right wrist pain about 2 years ago when her son was born.  She was hospitalized for 3 months antepartum for  labor and on bedrest for 3 months.  She was then sleeping in the NICU for a month after he son was born.  There was no specific injury or event leading to the wrist pain.   Pain ranges in intensity from 5 to 10, and averages 6 on the zero to ten numerical rating scale  Quality of the pain is sharp and stabbing  Aggravating factors include lifting her child and car seat  Relieving factors include tramadol, seems to be better during the day when she is busy  IMPACT OF PAIN: continuously uncomfortable in evenings when she is trying to relax/sleep, painful to lift and do activities required for caring for small child     DIAGNOSTIC TESTS:   Xray of the right wrist showing no abnormality- May, 2017  MRI upper extremity ordered but not completed    PAST PAIN TREATMENT:   PT - helpful   Brace - helpful   Tramadol 50 mg at night   Diet changes, now a vegetarian, which helps     CURRENT MEDICATIONS:   Current Outpatient Prescriptions Ordered in [facility]   Medication Sig Dispense Refill     acetaminophen-codeine (TYLENOL #3) 300-30 MG per tablet Take 1 tablet by mouth every 14 days Use 1 tablet every 2 weeks for migraine.  This is a 4 month supply 10 tablet 0     azithromycin (ZITHROMAX) 250 MG tablet Two tablets first day, then one tablet daily for four days. 6 tablet 0     FLUoxetine (PROZAC) 20 MG capsule TAKE 1 CAPSULE (10 MG) BY MOUTH DAILY 90 capsule 1     fluticasone (FLONASE) 50 MCG/ACT nasal spray Spray 1-2 sprays into both nostrils nightly as needed for rhinitis or allergies 1 g 3     guaiFENesin-codeine (ROBITUSSIN AC) 100-10 MG/5ML  "SOLN solution Take 5 mLs by mouth every 4 hours as needed for cough 120 mL 0     IBUPROFEN PO        order for DME Equipment being ordered: TENS 1 Device 0     traMADol (ULTRAM) 50 MG tablet Take 1 tablet (50 mg) by mouth nightly as needed for moderate pain 30 tablet 0     traMADol (ULTRAM) 50 MG tablet Take 1 tablet (50 mg) by mouth nightly as needed for severe pain 30 tablet 0     No current Epic-ordered facility-administered medications on file.        ALLERGIES:   Allergies   Allergen Reactions     Nkda [No Known Drug Allergies]        PAST MEDICAL AND PSYCHIATRIC HISTORY:  has a past medical history of Asthma; Depression (ages 18-21); Depressive disorder (ages 18-21); Miscarriage (Nov 2011); Ulcer (H); and UTI (lower urinary tract infection).    PAST SURGICAL HISTORY:  has a past surgical history that includes APPENDECTOMY (1991); Operative hysteroscopy with morcellator (N/A, 11/23/2015); and STOMACH SURGERY PROCEDURE UNLISTED (1990).    SOCIAL HISTORY: Please see the separate psychosocial evaluation by the health psychologist for additional information.    REVIEW OF SYSTEMS: Please refer to the patient's health questionnaire which I reviewed in detail with her.  This review covered 13 bodily systems.     PHYSICAL EXAMINATION:  VITAL SIGNS:  Blood pressure 121/81, pulse 59, resp. rate 16, height 1.778 m (5' 10\"), weight 71.7 kg (158 lb), not currently breastfeeding.  CONSTITUTIONAL/GENERAL APPEARANCE/:    In no apparent distress   No pain behaviors  EYES:   Extra ocular movements intact   No scleral icterus  ENT/NECK:   Neck range of motion full and normal for extension, flexion, bilateral rotation, and bilateral tilt.   Neck supple  RESPIRATORY:   No respiratory distress  CARDIOVASCULAR:   Regular rate, rhythm  MUSCULOSKELETAL/BACK/SPINE/EXTREMITIES:   Neck range of motion full and normal for extension, flexion, bilateral rotation, and bilateral tilt.   Lumbar range of motion full and normal for extension, " flexion, bilateral rotation, and bilateral tilt.   Full range of motion of all 4 extremities, no swelling or erythema of joints   Right radial styloid is enlarged and tender to palpation. No erythema or swelling, full range of motion of bilateral wrists.   NEURO:    AAOx3  Cranial nerves II-XII grossly intact   Moves all 4 extremities equally and normally   SKIN/VASCULAR/AUTONOMIC EXAM:   1. Rashes: none noted  2. Lesions: none  3. Skin temperature asymmetry: none  4. Skin color asymmetry: none  PSYCHIATRIC/BEHAVIORAL/OBSERVATIONS:    Judgment/Insight good   Orientation good   Memory intact, and is a decent historian     Mood and affect appear appropriate    ASSESSMENT:   Right wrist tenosynovitis   Chronic continuous use of opiates for therapeutic purposes       TREATMENT RECOMMENDATIONS/PLAN:  1. Medications: She would like to decrease her tramadol intake to twice per week from about 5-7 times per week.  I agree with this plan, and I will refill her tramadol in the meantime.  I will for now give her a supply of #30 pills, but she will try to take less than once per day.  She will return to clinic in 30 days and we will assess how many pills she needed.  She will bring in left over pills.  We will obtain a UDS today and have her sign an opiate contract with our clinic today.    She would rather not replace the tramadol with any other non-opiate medications.    2. Procedures: She would like to avoid any procedural interventions.  She will be given an order for TENS unit and referral for instructional edu.   3. Images/tests: none additional   4. Referrals: referrals made to PT hand therapy, and one-time referral to PT for TENS unit instruction.  Referral also made to Health Psychology for training in cognitive behavioral therapy and mindfulness.   5. PT/OT, exercise program: PT hand therapy referral   5. Follow up: 30 days     Thank you for the opportunity to care for this patient today,    Beverly Kebede,  MD    Answers for HPI/ROS submitted by the patient on 5/7/2018   General Symptoms: No  Skin Symptoms: No  HENT Symptoms: No  EYE SYMPTOMS: No  HEART SYMPTOMS: No  LUNG SYMPTOMS: No  INTESTINAL SYMPTOMS: No  URINARY SYMPTOMS: No  GYNECOLOGIC SYMPTOMS: Yes  BREAST SYMPTOMS: No  SKELETAL SYMPTOMS: No  BLOOD SYMPTOMS: No  NERVOUS SYSTEM SYMPTOMS: No  MENTAL HEALTH SYMPTOMS: No  Bleeding or spotting between periods: No  Heavy or painful periods: Yes  Irregular periods: No  Vaginal discharge: No  Hot flashes: No  Vaginal dryness: No  Genital ulcers: No  Reduced libido: Yes  Painful intercourse: No  Difficulty with sexual arousal: No  ANDREW 7 TOTAL SCORE: 1  PHQ-2 Score: 0    Answers for HPI/ROS submitted by the patient on 6/1/2018   ANDREW 7 TOTAL SCORE: 0  PHQ-2 Score: 0

## 2018-06-01 NOTE — LETTER
2018     RE: Renetta Lauren  [address]  [facility] [address]     Dear Colleague,    Thank you for referring your patient, Renetta Lauren, to the [facility] CLINIC FOR COMPREHENSIVE PAIN MANAGEMENT at [facility]. Please see a copy of my visit note below.    [facility] Adult Chronic Pain Service Outpatient Consultation    REASON FOR PAIN CONSULTATION:  Renetta Lauren is a 42 year old female I am seeing in consultation at the request of Dr. Seaman for evaluation and recommendations for her wrist pain.     PAIN HISTORY:   She developed right wrist pain about 2 years ago when her son was born.  She was hospitalized for 3 months antepartum for  labor and on bedrest for 3 months.  She was then sleeping in the NICU for a month after he son was born.  There was no specific injury or event leading to the wrist pain.   Pain ranges in intensity from 5 to 10, and averages 6 on the zero to ten numerical rating scale  Quality of the pain is sharp and stabbing  Aggravating factors include lifting her child and car seat  Relieving factors include tramadol, seems to be better during the day when she is busy  IMPACT OF PAIN: continuously uncomfortable in evenings when she is trying to relax/sleep, painful to lift and do activities required for caring for small child     DIAGNOSTIC TESTS:   Xray of the right wrist showing no abnormality- May, 2017  MRI upper extremity ordered but not completed    PAST PAIN TREATMENT:   PT - helpful   Brace - helpful   Tramadol 50 mg at night   Diet changes, now a vegetarian, which helps     CURRENT MEDICATIONS:   Current Outpatient Prescriptions Ordered in [facility]   Medication Sig Dispense Refill     acetaminophen-codeine (TYLENOL #3) 300-30 MG per tablet Take 1 tablet by mouth every 14 days Use 1 tablet every 2 weeks for migraine.  This is a 4 month supply 10 tablet 0     azithromycin (ZITHROMAX) 250 MG tablet Two tablets first day, then one tablet  "daily for four days. 6 tablet 0     FLUoxetine (PROZAC) 20 MG capsule TAKE 1 CAPSULE (10 MG) BY MOUTH DAILY 90 capsule 1     fluticasone (FLONASE) 50 MCG/ACT nasal spray Spray 1-2 sprays into both nostrils nightly as needed for rhinitis or allergies 1 g 3     guaiFENesin-codeine (ROBITUSSIN AC) 100-10 MG/5ML SOLN solution Take 5 mLs by mouth every 4 hours as needed for cough 120 mL 0     IBUPROFEN PO        order for DME Equipment being ordered: TENS 1 Device 0     traMADol (ULTRAM) 50 MG tablet Take 1 tablet (50 mg) by mouth nightly as needed for moderate pain 30 tablet 0     traMADol (ULTRAM) 50 MG tablet Take 1 tablet (50 mg) by mouth nightly as needed for severe pain 30 tablet 0     No current Epic-ordered facility-administered medications on file.        ALLERGIES:   Allergies   Allergen Reactions     Nkda [No Known Drug Allergies]        PAST MEDICAL AND PSYCHIATRIC HISTORY:  has a past medical history of Asthma; Depression (ages 18-21); Depressive disorder (ages 18-21); Miscarriage (Nov 2011); Ulcer (H); and UTI (lower urinary tract infection).    PAST SURGICAL HISTORY:  has a past surgical history that includes APPENDECTOMY (1991); Operative hysteroscopy with morcellator (N/A, 11/23/2015); and STOMACH SURGERY PROCEDURE UNLISTED (1990).    SOCIAL HISTORY: Please see the separate psychosocial evaluation by the health psychologist for additional information.    REVIEW OF SYSTEMS: Please refer to the patient's health questionnaire which I reviewed in detail with her.  This review covered 13 bodily systems.     PHYSICAL EXAMINATION:  VITAL SIGNS:  Blood pressure 121/81, pulse 59, resp. rate 16, height 1.778 m (5' 10\"), weight 71.7 kg (158 lb), not currently breastfeeding.  CONSTITUTIONAL/GENERAL APPEARANCE/:    In no apparent distress   No pain behaviors  EYES:   Extra ocular movements intact   No scleral icterus  ENT/NECK:   Neck range of motion full and normal for extension, flexion, bilateral rotation, and " bilateral tilt.   Neck supple  RESPIRATORY:   No respiratory distress  CARDIOVASCULAR:   Regular rate, rhythm  MUSCULOSKELETAL/BACK/SPINE/EXTREMITIES:   Neck range of motion full and normal for extension, flexion, bilateral rotation, and bilateral tilt.   Lumbar range of motion full and normal for extension, flexion, bilateral rotation, and bilateral tilt.   Full range of motion of all 4 extremities, no swelling or erythema of joints   Right radial styloid is enlarged and tender to palpation. No erythema or swelling, full range of motion of bilateral wrists.   NEURO:    AAOx3  Cranial nerves II-XII grossly intact   Moves all 4 extremities equally and normally   SKIN/VASCULAR/AUTONOMIC EXAM:   1. Rashes: none noted  2. Lesions: none  3. Skin temperature asymmetry: none  4. Skin color asymmetry: none  PSYCHIATRIC/BEHAVIORAL/OBSERVATIONS:    Judgment/Insight good   Orientation good   Memory intact, and is a decent historian     Mood and affect appear appropriate    ASSESSMENT:   Right wrist tenosynovitis   Chronic continuous use of opiates for therapeutic purposes       TREATMENT RECOMMENDATIONS/PLAN:  1. Medications: She would like to decrease her tramadol intake to twice per week from about 5-7 times per week.  I agree with this plan, and I will refill her tramadol in the meantime.  I will for now give her a supply of #30 pills, but she will try to take less than once per day.  She will return to clinic in 30 days and we will assess how many pills she needed.  She will bring in left over pills.  We will obtain a UDS today and have her sign an opiate contract with our clinic today.    She would rather not replace the tramadol with any other non-opiate medications.    2. Procedures: She would like to avoid any procedural interventions.  She will be given an order for TENS unit and referral for instructional edu.   3. Images/tests: none additional   4. Referrals: referrals made to PT hand therapy, and one-time referral  to PT for TENS unit instruction.  Referral also made to Health Psychology for training in cognitive behavioral therapy and mindfulness.   5. PT/OT, exercise program: PT hand therapy referral   5. Follow up: 30 days     Thank you for the opportunity to care for this patient today,    Beverly Kebede MD    Answers for HPI/ROS submitted by the patient on 5/7/2018   General Symptoms: No  Skin Symptoms: No  HENT Symptoms: No  EYE SYMPTOMS: No  HEART SYMPTOMS: No  LUNG SYMPTOMS: No  INTESTINAL SYMPTOMS: No  URINARY SYMPTOMS: No  GYNECOLOGIC SYMPTOMS: Yes  BREAST SYMPTOMS: No  SKELETAL SYMPTOMS: No  BLOOD SYMPTOMS: No  NERVOUS SYSTEM SYMPTOMS: No  MENTAL HEALTH SYMPTOMS: No  Bleeding or spotting between periods: No  Heavy or painful periods: Yes  Irregular periods: No  Vaginal discharge: No  Hot flashes: No  Vaginal dryness: No  Genital ulcers: No  Reduced libido: Yes  Painful intercourse: No  Difficulty with sexual arousal: No  ANDREW 7 TOTAL SCORE: 1  PHQ-2 Score: 0    Answers for HPI/ROS submitted by the patient on 6/1/2018   ANDREW 7 TOTAL SCORE: 0  PHQ-2 Score: 0    Again, thank you for allowing me to participate in the care of your patient.      Sincerely,    Beverly Kebede MD

## 2018-06-01 NOTE — MR AVS SNAPSHOT
After Visit Summary   6/1/2018    Renetta Lauren    MRN: [id number]           Patient Information     Date Of Birth          1975        Visit Information        Provider Department      6/1/2018 2:30 PM Beverly Kebede MD [facility]        Today's Diagnoses     Radial styloid tenosynovitis        Right wrist pain          Care Instructions    1. Tramadol prescribed- 30 tablets. Take medication as needed for wrist pain.     2. Referral placed to Health Psychology-     To make an appointment for health psychology, call any of these providers to make an appointmen:    Dr. Jordan Chen [phone]  Dr. Shirin Gonzalez [phone]  Dr. Virginia Odonnell [phone]  Dr. Flor Solorio [phone]  Dr. Ni Garces [phone]  Dr. Puja Marc [phone]      Follow up: 3 months in clinic with Dr. Kebede.       To speak with a nurse, schedule/reschedule/cancel a clinic appointment, or request a medication refill call: [phone]     You can also reach us by 1[employer] Online: [email or web address]    For refills, please call on Monday, 1 week before your medication runs out. The doctors are not always in clinic, so this gives us time to get your prescriptions ready.  Please let us know the name of the medication you are requesting a refill of.                                     Follow-ups after your visit        Additional Services     PSYCHOLOGY REFERRAL       To make an appointment for health psychology, call any of these providers to make an appointment:    Dr. Jordan Chen [phone]  Dr. Shirin Gonzalez [phone]  Dr. Virginia Odonnell [phone]  Dr. Flor Solorio [phone]  Dr. Ni Garces [phone]  Dr. Puja Marc [phone]                  Your next 10 appointments already scheduled     Jun 05, 2018  2:30 PM CDT   (Arrive by 2:15 PM)   ANA MARIA Hand with Lorene Claros OT   [facility] ([facility]    [address]  [address] [facility] [address] [phone]              Who to contact     Please call your clinic at [phone] to:    Ask questions about your health    Make or cancel appointments    Discuss your medicines    Learn about your test results    Speak to your doctor            Additional Information About Your Visit        MyChart Information     [employer] gives you secure access to your electronic health record. If you see a primary care provider, you can also send messages to your care team and make appointments. If you have questions, please call your primary care clinic.  If you do not have a primary care provider, please call [phone] and they will assist you.      [employer] is an electronic gateway that provides easy, online access to your medical records. With [employer], you can request a clinic appointment, read your test results, renew a prescription or communicate with your care team.     To access your existing account, please contact your [facility] or call [phone] for assistance.        Care EveryWhere ID     This is your Care EveryWhere ID. This could be used by other organizations to access your [facility] medical records  [id number]        Your Vitals Were     Pulse Respirations Height BMI (Body Mass Index)          59 16 1.778 m (5' 10\") 22.67 kg/m2         Blood Pressure from Last 3 Encounters:   06/01/18 121/81   05/07/18 129/88   04/05/18 114/77    Weight from Last 3 Encounters:   06/01/18 71.7 kg (158 lb)   05/07/18 71.7 kg (158 lb)   04/25/18 71.7 kg (158 lb)              We Performed the Following     PSYCHOLOGY REFERRAL          Where to get your medicines      Some of these will need a paper prescription and others can be bought over the counter.  Ask your nurse if you have questions.     Bring a paper prescription for each of these medications     traMADol 50 MG tablet         Information about OPIOIDS     " PRESCRIPTION OPIOIDS: WHAT YOU NEED TO KNOW   You have a prescription for an opioid (narcotic) pain medicine. Opioids can cause addiction. If you have a history of chemical dependency of any type, you are at a higher risk of becoming addicted to opioids. Only take this medicine after all other options have been tried. Take it for as short a time and as few doses as possible.     Do not:    Drive. If you drive while taking these medicines, you could be arrested for driving under the influence (DUI).    Operate heavy machinery    Do any other dangerous activities while taking these medicines.     Drink any alcohol while taking these medicines.      Take with any other medicines that contain acetaminophen. Read all labels carefully. Look for the word  acetaminophen  or  Tylenol.  Ask your pharmacist if you have questions or are unsure.    Store your pills in a secure place, locked if possible. We will not replace any lost or stolen medicine. If you don t finish your medicine, please throw away (dispose) as directed by your pharmacist. The [place] [employer]ution [employer] has more information about safe disposal: https://www.pca.[facility].[place].us/living-green/managing-unwanted-medications    All opioids tend to cause constipation. Drink plenty of water and eat foods that have a lot of fiber, such as fruits, vegetables, prune juice, apple juice and high-fiber cereal. Take a laxative (Miralax, milk of magnesia, Colace, Senna) if you don t move your bowels at least every other day.          Primary Care Provider Office Phone # Fax #    [facility] Jack Garza [phone] [address] [address]        Equal Access to Services     ANNETTE BACON : Hadgiovanna Suarez, waaxda luqadaha, qaybta kaalmada erickson douglass. So [facility] [phone].    ATENCIÓN: Si habla español, tiene a george disposición servicios gratuitos de asistencia lingüística. Llame al  [phone].    We comply with applicable federal civil rights laws and [place] laws. We do not discriminate on the basis of race, color, national origin, age, disability, sex, sexual orientation, or gender identity.            Thank you!     Thank you for choosing [facility]  for your care. Our goal is always to provide you with excellent care. Hearing back from our patients is one way we can continue to improve our services. Please take a few minutes to complete the written survey that you may receive in the mail after your visit with us. Thank you!             Your Updated Medication List - Protect others around you: Learn how to safely use, store and throw away your medicines at www.disposemymeds.org.          This list is accurate as of 6/1/18  3:31 PM.  Always use your most recent med list.                   Brand Name Dispense Instructions for use Diagnosis    acetaminophen-codeine 300-30 MG per tablet    TYLENOL #3    10 tablet    Take 1 tablet by mouth every 14 days Use 1 tablet every 2 weeks for migraine.  This is a 4 month supply    Migraine without aura and without status migrainosus, not intractable       azithromycin 250 MG tablet    ZITHROMAX    6 tablet    Two tablets first day, then one tablet daily for four days.    Acute bronchitis, unspecified organism       FLUoxetine 20 MG capsule    PROzac    90 capsule    TAKE 1 CAPSULE (10 MG) BY MOUTH DAILY        fluticasone 50 MCG/ACT spray    FLONASE    1 g    Spray 1-2 sprays into both nostrils nightly as needed for rhinitis or allergies    Post-nasal drip       guaiFENesin-codeine 100-10 MG/5ML Soln solution    ROBITUSSIN AC    120 mL    Take 5 mLs by mouth every 4 hours as needed for cough    Acute bronchitis, unspecified organism       IBUPROFEN PO           order for DME     1 Device    Equipment being ordered: TENS    Radial styloid tenosynovitis       * traMADol 50 MG tablet    ULTRAM    30 tablet    Take  1 tablet (50 mg) by mouth nightly as needed for severe pain    Radial styloid tenosynovitis       * traMADol 50 MG tablet    ULTRAM    30 tablet    Take 1 tablet (50 mg) by mouth nightly as needed for moderate pain    Right wrist pain       * Notice:  This list has 2 medication(s) that are the same as other medications prescribed for you. Read the directions carefully, and ask your doctor or other care provider to review them with you.

## 2018-06-01 NOTE — PATIENT INSTRUCTIONS
1. Tramadol prescribed- 30 tablets. Take medication as needed for wrist pain.     2. Referral placed to Health Psychology-     To make an appointment for health psychology, call any of these providers to make an appointmen:    Dr. Jordan Chen [phone]  Dr. Shirin Gonzalez [phone]  Dr. Virginia Odonnell [phone]  Dr. Flor Solorio [phone]  Dr. Ni Garces [phone]  Dr. Puja Marc [phone]      Follow up: 3 months in clinic with Dr. Kebede.       To speak with a nurse, schedule/reschedule/cancel a clinic appointment, or request a medication refill call: [phone]     You can also reach us by [employer]: [email or web address]    For refills, please call on Monday, 1 week before your medication runs out. The doctors are not always in clinic, so this gives us time to get your prescriptions ready.  Please let us know the name of the medication you are requesting a refill of.

## 2018-06-02 ASSESSMENT — ANXIETY QUESTIONNAIRES: GAD7 TOTAL SCORE: 0

## 2018-06-05 ENCOUNTER — THERAPY VISIT (OUTPATIENT)
Dept: OCCUPATIONAL THERAPY | Facility: CLINIC | Age: 43
End: 2018-06-05
Payer: COMMERCIAL

## 2018-06-05 DIAGNOSIS — R20.2 PARESTHESIA OF RIGHT ARM: ICD-10-CM

## 2018-06-05 DIAGNOSIS — M25.531 RIGHT WRIST PAIN: Primary | ICD-10-CM

## 2018-06-05 PROCEDURE — 97535 SELF CARE MNGMENT TRAINING: CPT | Mod: GO | Performed by: OCCUPATIONAL THERAPIST

## 2018-06-05 PROCEDURE — 97165 OT EVAL LOW COMPLEX 30 MIN: CPT | Mod: GO | Performed by: OCCUPATIONAL THERAPIST

## 2018-06-05 PROCEDURE — 97110 THERAPEUTIC EXERCISES: CPT | Mod: GO | Performed by: OCCUPATIONAL THERAPIST

## 2018-06-05 NOTE — MR AVS SNAPSHOT
After Visit Summary   6/5/2018    Renetta Lauren    MRN: [id number]           Patient Information     Date Of Birth          1975        Visit Information        Provider Department      6/5/2018 2:30 PM Lorene Claros OT [facility]        Today's Diagnoses     Right wrist pain    -  1    Paresthesia of right arm           Follow-ups after your visit        Your next 10 appointments already scheduled     Jun 12, 2018  2:00 PM CDT   ANA MARIA Hand with [facility] ([facility])    [address] [place] [address] [phone]            Jun 20, 2018  2:30 PM CDT   ANA MARIA Hand with [facility] ([facility])    [address] [place] [address] [phone]            Jul 10, 2018  2:30 PM CDT   ANA MARIA Hand with [facility] ([facility])    [address] [place] [id number] [phone]            Jul 17, 2018  1:30 PM CDT   ANA MARIA Hand with [facility] ([facility])    [address] [place] [id number] [phone]              Who to contact     If you have questions or need follow up information about today's clinic visit or your schedule please contact [facility] directly at [phone].  Normal or non-critical lab and imaging results will be communicated to you by MyChart, letter or phone within 4 business days after the clinic has received the results. If you do not hear from us within 7 days, please contact the clinic through MyChart or phone. If you have a critical or abnormal lab result, we will notify you by phone as soon as possible.  Submit refill requests through [employer] or call your pharmacy and they will forward the refill request to us. Please allow 3  business days for your refill to be completed.          Additional Information About Your Visit        MyChart Information     [employer] gives you secure access to your electronic health record. If you see a primary care provider, you can also send messages to your care team and make appointments. If you have questions, please call your primary care clinic.  If you do not have a primary care provider, please call [phone] and they will assist you.        Care EveryWhere ID     This is your Care EveryWhere ID. This could be used by other organizations to access your [place] medical records  [id number]         Blood Pressure from Last 3 Encounters:   06/01/18 121/81   05/07/18 129/88   04/05/18 114/77    Weight from Last 3 Encounters:   06/01/18 71.7 kg (158 lb)   05/07/18 71.7 kg (158 lb)   04/25/18 71.7 kg (158 lb)              We Performed the Following     HC OT EVAL, LOW COMPLEXITY     ANA MARIA INITIAL EVAL REPORT     SELF CARE MNGMENT TRAINING     THERAPEUTIC EXERCISES        Primary Care Provider Office Phone # Fax #    [facility] Jack Garza [phone] [address]  [place] [address]        Equal Access to Services     ANNETTE BACON AH: Hadii hebert garciao Sominh, waaxda luqadaha, qaybta kaalmada adepaulyada, erickson martinez. So [facility] [phone].    ATENCIÓN: Si habla español, tiene a george disposición servicios gratuitos de asistencia lingüística. Llame al [phone].    We comply with applicable federal civil rights laws and [place] laws. We do not discriminate on the basis of race, color, national origin, age, disability, sex, sexual orientation, or gender identity.            Thank you!     Thank you for choosing  [facility]  for your care. Our goal is always to provide you with excellent care. Hearing back from our patients is one way we can continue to improve our services. Please take a few minutes to complete the written survey that you  may receive in the mail after your visit with us. Thank you!             Your Updated Medication List - Protect others around you: Learn how to safely use, store and throw away your medicines at www.disposemymeds.org.          This list is accurate as of 6/5/18 11:59 PM.  Always use your most recent med list.                   Brand Name Dispense Instructions for use Diagnosis    acetaminophen-codeine 300-30 MG per tablet    TYLENOL #3    10 tablet    Take 1 tablet by mouth every 14 days Use 1 tablet every 2 weeks for migraine.  This is a 4 month supply    Migraine without aura and without status migrainosus, not intractable       azithromycin 250 MG tablet    ZITHROMAX    6 tablet    Two tablets first day, then one tablet daily for four days.    Acute bronchitis, unspecified organism       FLUoxetine 20 MG capsule    PROzac    90 capsule    TAKE 1 CAPSULE (10 MG) BY MOUTH DAILY        fluticasone 50 MCG/ACT spray    FLONASE    1 g    Spray 1-2 sprays into both nostrils nightly as needed for rhinitis or allergies    Post-nasal drip       guaiFENesin-codeine 100-10 MG/5ML Soln solution    ROBITUSSIN AC    120 mL    Take 5 mLs by mouth every 4 hours as needed for cough    Acute bronchitis, unspecified organism       IBUPROFEN PO           order for DME     1 Device    Equipment being ordered: TENS    Radial styloid tenosynovitis       * traMADol 50 MG tablet    ULTRAM    30 tablet    Take 1 tablet (50 mg) by mouth nightly as needed for severe pain    Radial styloid tenosynovitis       * traMADol 50 MG tablet    ULTRAM    30 tablet    Take 1 tablet (50 mg) by mouth nightly as needed for moderate pain    Right wrist pain       * Notice:  This list has 2 medication(s) that are the same as other medications prescribed for you. Read the directions carefully, and ask your doctor or other care provider to review them with you.

## 2018-06-05 NOTE — PROGRESS NOTES
Hand Therapy Initial Evaluation    Current Date:  6/5/2018    Diagnosis: Right Radial Styloid Tenosynovitis and Ulnar Nerve pain  DOI: 2015  MD Order Date: 05/07/18     Subjective:  Renetta Lauren is a 42 year old right hand dominant female.    Patient reports symptoms of pain, weakness/loss of strength, edema, numbness and tingling  of the right wrist and hand which occurred due to nonuse during bedrest from pregnancy in 2015 (according to pt). Since onset symptoms are Gradually getting better.  Special tests:  EMG.  Previous treatment: hand therapy in 2017.    General health as reported by patient is excellent.  Pertinent medical history includes:Migraines/Headaches, Numbness/Tingling  Medical allergies:none.  Surgical history: other: appendectomy.  Medication history: Anti-inflammatory, tramadol prn.    Occupational Profile Information:  Current occupation is   Currently working in normal job without restrictions  Job Tasks: Computer Work, Driving, Lifting, Carrying, Prolonged Sitting, Pushing, Pulling  Prior functional level:  no limitations  Barriers include:none  Mobility: No difficulty  Transportation: drives  Leisure activities/hobbies: tennis (prior to injury), read, hike, taking son for walks in [facility]    Functional Outcome Measure:  Upper Extremity Functional Index Score:  SCORE:   Column Totals: /80: 70   (A lower score indicates greater disability.)    Objective:  Pain Level Report  VAS(0-10) 6/5/2018   At Rest: 3/10   With Use: 6/10     Report of Pain:  Location:  Wrist, radiating into forearm (both volar and dorsal). Sharp pain in thumb has significantly decreased  Pain Quality:  Aching, Burning, Sharp and Shooting aching/burning tingling most of time, sharp shooting occasionally  Frequency: constant    Pain is worst:  Nighttime - around 6/7pm  Exacerbated by:  At rest, pain is not so noticible during ax, builds up to night  Relieved by:  cold, rest and tramadol, massage therapy,  "  Progression:  Unchanged  Sensation: Altered sensation in ulnar nerve distribution. EMG results show \"electrodiagnostic evidence of a very mild right ulnar neuropathy at the elbow\".  ROM:  Pain Report:  - none    + mild    ++ moderate    +++ severe    6/5/2018    Thumb AROM  PROM R L    MP 65     IP 85     Rabd 55     Pabd 45    Wrist Ext      Flex      RD 25 20    UD 35+ 45    Sup      Pro       Pain level report on scale of 0-10/10  Resisted Testing 6/5/2018   APL 0   EPB 0   RD 0   UD 0   Wrist Ext 0   Wrist Flex 0     Strength:   (Measured in pounds)  Pain Report:  - none    + mild    ++ moderate    +++ severe     6/5/2018   Trials L R   1  2  3 45 45   Average:       Lat Pinch  6/5/2018   Trials L R   1  2  3 10 12   Average:       3 Pt Pinch  6/5/2018   Trials L R   1  2  3 11 10   Average:         Special Tests 6/5/2018   Finkelsteins R:+ (but not as bad as previously)  L:-   Ulnar Nerve Tinel's at cubital tunnel R: ++     Assessment:  Patient presents with symptoms consistent with diagnosis of DeQuervains tenosynovitis, with conservative intervention.   Patient's limitations or Problem List includes:  Pain, Decreased ROM/motion, Weakness, Decreased  and pinch strength and tightness in musculature of the right wrist and forearm which interferes with the patient's ability to perform Work Tasks, Sleep Patterns and Household Chores as compared to previous level of function.    Rehab Potential:  Excellent - Return to full activity, no limitations    Patient will benefit from skilled Occupational Therapy to increase wrist and thumb ROM, flexibility,  strength and pinch strength and decrease pain to return to previous activity level and resume normal daily tasks and to reach their rehab potential.    Barriers to Learning:  No barrier    Communication Issues:  Patient appears to be able to clearly communicate and understand verbal and written communication and follow directions correctly.    Chart " Review: Brief history including review of medical and/or therapy records relating to the presenting problem and Simple history review with patient    Identified Performance Deficits: child rearing, home establishment and management, meal preparation and cleanup, sleep, work and leisure activities    Assessment of Occupational Performance:  5 or more Performance Deficits    Clinical Decision Making (Complexity): Low complexity    Treatment Explanation:  The following has been discussed with the patient:    RX ordered/plan of care  Anticipated outcomes  Possible risks and side effects    Treatment Plan:    Frequency:  1 X week, once daily  Duration:  for 6 weeks    Modalities:    US   Therapeutic Exercise:   AROM of wrist and thumb  PROM with stretch to wrist and thumb extensors   Isometric progressing to isotonic strengthening  Manual Techniques:   Friction massage over 1st dorsal compartment  MFR  Neuromuscular Reeducation   Nerve Gliding, Posture and Kinesiotaping  Orthotic Fabrication:  Forearm based thumb spica orthosis  Activity:   Avoid activities that exacerbate pain in the wrist or thumb.    Avoid  with twist, wide grasp.    Discharge Plan:    Achieve all LTG.  Independent in home treatment program.  Reach maximal therapeutic benefit.    Home Exercise Program:  Ulnar and radial nerve glides  Actively thinking of elbow posture during ax  AROM wrist (e/f, r/u dev)  AROM thumb flexion and palmar abduction  DeQuervain's stretch  Wearing splint during ax and at night when pain is worst to rest wrist    Next Visit:  MFR of forearm  Passive ulnar nerve glide  US  Isotonic wrist strength if pain allows

## 2018-06-06 PROBLEM — R20.2 PARESTHESIA OF RIGHT ARM: Status: ACTIVE | Noted: 2018-06-06

## 2018-06-12 ENCOUNTER — THERAPY VISIT (OUTPATIENT)
Dept: OCCUPATIONAL THERAPY | Facility: CLINIC | Age: 43
End: 2018-06-12
Payer: COMMERCIAL

## 2018-06-12 DIAGNOSIS — M25.531 RIGHT WRIST PAIN: ICD-10-CM

## 2018-06-12 DIAGNOSIS — R20.2 PARESTHESIA OF RIGHT ARM: ICD-10-CM

## 2018-06-12 PROCEDURE — 97035 APP MDLTY 1+ULTRASOUND EA 15: CPT | Mod: GO | Performed by: OCCUPATIONAL THERAPIST

## 2018-06-12 PROCEDURE — 97140 MANUAL THERAPY 1/> REGIONS: CPT | Mod: GO | Performed by: OCCUPATIONAL THERAPIST

## 2018-06-20 ENCOUNTER — THERAPY VISIT (OUTPATIENT)
Dept: OCCUPATIONAL THERAPY | Facility: CLINIC | Age: 43
End: 2018-06-20
Payer: COMMERCIAL

## 2018-06-20 DIAGNOSIS — R20.2 PARESTHESIA OF RIGHT ARM: ICD-10-CM

## 2018-06-20 DIAGNOSIS — M25.531 RIGHT WRIST PAIN: ICD-10-CM

## 2018-06-20 PROCEDURE — 97140 MANUAL THERAPY 1/> REGIONS: CPT | Mod: GO | Performed by: OCCUPATIONAL THERAPIST

## 2018-06-20 PROCEDURE — 97112 NEUROMUSCULAR REEDUCATION: CPT | Mod: GO | Performed by: OCCUPATIONAL THERAPIST

## 2018-06-20 NOTE — MR AVS SNAPSHOT
After Visit Summary   6/20/2018    Renetta Lauren    MRN: [id number]           Patient Information     Date Of Birth          1975        Visit Information        Provider Department      6/20/2018 2:30 PM Lorene Claros OT [facility]        Today's Diagnoses     Paresthesia of right arm        Right wrist pain           Follow-ups after your visit        Your next 10 appointments already scheduled     Jul 10, 2018  2:30 PM CDT   ANA MARIA Hand with [facility] ([facility])    [address] [place] [id number] [phone]            Jul 17, 2018  1:30 PM CDT   ANA MARIA Hand with [facility] ([facility])    [address] [place] [address] [phone]              Who to contact     If you have questions or need follow up information about today's clinic visit or your schedule please contact [facility] directly at [phone].  Normal or non-critical lab and imaging results will be communicated to you by Ninghart, letter or phone within 4 business days after the clinic has received the results. If you do not hear from us within 7 days, please contact the clinic through Negoramat or phone. If you have a critical or abnormal lab result, we will notify you by phone as soon as possible.  Submit refill requests through cuaQea or call your pharmacy and they will forward the refill request to us. Please allow 3 business days for your refill to be completed.          Additional Information About Your Visit        MyChart Information     cuaQea gives you secure access to your electronic health record. If you see a primary care provider, you can also send messages to your care team and make appointments. If you have questions, please call your primary care clinic.  If you do not have a primary care provider, please call  [phone] and they will assist you.        Care EveryWhere ID     This is your Care EveryWhere ID. This could be used by other organizations to access your [facility] medical records  [id number]         Blood Pressure from Last 3 Encounters:   06/01/18 121/81   05/07/18 129/88   04/05/18 114/77    Weight from Last 3 Encounters:   06/01/18 71.7 kg (158 lb)   05/07/18 71.7 kg (158 lb)   04/25/18 71.7 kg (158 lb)              We Performed the Following     MANUAL THER TECH,1+REGIONS,EA 15 MIN     NEUROMUSCULAR RE-EDUCATION        Primary Care Provider Office Phone # Fax #    Clayzqax Jack Garza [phone] [address] [facility] [address]  [place] [address]        Equal Access to Services     [facility]: Hadii aad ku hadasho Soomaali, waaxda luqadaha, qaybta kaalmada adepaulyada, erickson morse . So [facility] [phone].    ATENCIÓN: Si habla español, tiene a george disposición servicios gratuitos de asistencia lingüística. Laura al [phone].    We comply with applicable federal civil rights laws and [place] laws. We do not discriminate on the basis of race, color, national origin, age, disability, sex, sexual orientation, or gender identity.            Thank you!     Thank you for choosing [facility]  for your care. Our goal is always to provide you with excellent care. Hearing back from our patients is one way we can continue to improve our services. Please take a few minutes to complete the written survey that you may receive in the mail after your visit with us. Thank you!             Your Updated Medication List - Protect others around you: Learn how to safely use, store and throw away your medicines at www.disposemymeds.org.          This list is accurate as of 6/20/18  3:20 PM.  Always use your most recent med list.                   Brand Name Dispense Instructions for use Diagnosis    acetaminophen-codeine 300-30 MG per tablet    TYLENOL #3    10 tablet     Take 1 tablet by mouth every 14 days Use 1 tablet every 2 weeks for migraine.  This is a 4 month supply    Migraine without aura and without status migrainosus, not intractable       azithromycin 250 MG tablet    ZITHROMAX    6 tablet    Two tablets first day, then one tablet daily for four days.    Acute bronchitis, unspecified organism       FLUoxetine 20 MG capsule    PROzac    90 capsule    TAKE 1 CAPSULE (10 MG) BY MOUTH DAILY        fluticasone 50 MCG/ACT spray    FLONASE    1 g    Spray 1-2 sprays into both nostrils nightly as needed for rhinitis or allergies    Post-nasal drip       guaiFENesin-codeine 100-10 MG/5ML Soln solution    ROBITUSSIN AC    120 mL    Take 5 mLs by mouth every 4 hours as needed for cough    Acute bronchitis, unspecified organism       IBUPROFEN PO           order for DME     1 Device    Equipment being ordered: TENS    Radial styloid tenosynovitis       * traMADol 50 MG tablet    ULTRAM    30 tablet    Take 1 tablet (50 mg) by mouth nightly as needed for severe pain    Radial styloid tenosynovitis       * traMADol 50 MG tablet    ULTRAM    30 tablet    Take 1 tablet (50 mg) by mouth nightly as needed for moderate pain    Right wrist pain       * Notice:  This list has 2 medication(s) that are the same as other medications prescribed for you. Read the directions carefully, and ask your doctor or other care provider to review them with you.

## 2018-06-20 NOTE — PROGRESS NOTES
"SOAP note information for 6/20/2018.    Subjective:   Patient reports that she isn't having much pain during the day, but it increases significantly at night. The pain is diffuse and has burning quality to it, especially to volar forearm and palm. It also increases pain at first DC.  She discussed that she's tying to decrease her opioid use but it is hard when the pain is keeping her from sleep and/or waking her up. She's unsure that the pain clinic was helpful. Discussed that with chronic pain, there can be some psychological component to it.  Ice is helpful but only while it's on.   She was given the number to schedule an appt to see PT for \"TENS unit distribution and instruction\" per Dr. Kebede's order on 5/7/18      "

## 2018-06-22 ENCOUNTER — MYC REFILL (OUTPATIENT)
Dept: ANESTHESIOLOGY | Facility: CLINIC | Age: 43
End: 2018-06-22

## 2018-06-22 DIAGNOSIS — M25.531 RIGHT WRIST PAIN: ICD-10-CM

## 2018-06-25 RX ORDER — TRAMADOL HYDROCHLORIDE 50 MG/1
50 TABLET ORAL 2 TIMES DAILY PRN
Qty: 60 TABLET | Refills: 0 | Status: SHIPPED | OUTPATIENT
Start: 2018-06-25 | End: 2018-08-08

## 2018-06-25 NOTE — TELEPHONE ENCOUNTER
Verbal orders given by Dr. Kebede to increase tramadol to 50mg BID PRN.   checked and is appropriate.     Ebony Rodriguez, RN, BSN

## 2018-06-25 NOTE — TELEPHONE ENCOUNTER
Message from [employer]:  Original authorizing provider: Beverly Kebede MD    Renetta Lauren would like a refill of the following medications:  traMADol (ULTRAM) 50 MG tablet [Beverly Kebede MD]    Preferred pharmacy: [facility] #[id number] [address]    Comment:  Helkarlene Kebede, I'm requesting a renewal and wanted to update you. So far, the increased pain level from the pinched nerve (the specialist said it was mild, so I'm a bit puzzled why it's causing this much intermittent discomfort) has made it too uncomfortable to go medication free more than 2-3 days since you wrote me the last prescription on 6/1. I've needed a second dose around 1-3 a.m. some nights during sleep disruption, unfortunately. Hand therapy has been great--thank you! Renetta

## 2018-07-13 ENCOUNTER — THERAPY VISIT (OUTPATIENT)
Dept: OCCUPATIONAL THERAPY | Facility: CLINIC | Age: 43
End: 2018-07-13
Payer: COMMERCIAL

## 2018-07-13 DIAGNOSIS — M25.531 RIGHT WRIST PAIN: ICD-10-CM

## 2018-07-13 DIAGNOSIS — R20.2 PARESTHESIA OF RIGHT ARM: ICD-10-CM

## 2018-07-13 PROCEDURE — 97112 NEUROMUSCULAR REEDUCATION: CPT | Mod: GO | Performed by: OCCUPATIONAL THERAPIST

## 2018-07-13 PROCEDURE — 97035 APP MDLTY 1+ULTRASOUND EA 15: CPT | Mod: GO | Performed by: OCCUPATIONAL THERAPIST

## 2018-07-13 PROCEDURE — 97140 MANUAL THERAPY 1/> REGIONS: CPT | Mod: GO | Performed by: OCCUPATIONAL THERAPIST

## 2018-07-17 ENCOUNTER — THERAPY VISIT (OUTPATIENT)
Dept: OCCUPATIONAL THERAPY | Facility: CLINIC | Age: 43
End: 2018-07-17
Payer: COMMERCIAL

## 2018-07-17 DIAGNOSIS — M25.531 RIGHT WRIST PAIN: ICD-10-CM

## 2018-07-17 DIAGNOSIS — R20.2 PARESTHESIA OF RIGHT ARM: ICD-10-CM

## 2018-07-17 PROCEDURE — 97035 APP MDLTY 1+ULTRASOUND EA 15: CPT | Mod: GO | Performed by: OCCUPATIONAL THERAPIST

## 2018-07-17 PROCEDURE — 97112 NEUROMUSCULAR REEDUCATION: CPT | Mod: GO | Performed by: OCCUPATIONAL THERAPIST

## 2018-07-17 PROCEDURE — 97140 MANUAL THERAPY 1/> REGIONS: CPT | Mod: GO | Performed by: OCCUPATIONAL THERAPIST

## 2018-07-17 NOTE — MR AVS SNAPSHOT
After Visit Summary   7/17/2018    Renetta Lauren    MRN: [id number]           Patient Information     Date Of Birth          1975        Visit Information        Provider Department      7/17/2018 1:30 PM Lorene Claros OT [facility]        Today's Diagnoses     Paresthesia of right arm        Right wrist pain           Follow-ups after your visit        Who to contact     If you have questions or need follow up information about today's clinic visit or your schedule please contact [facility] directly at [phone].  Normal or non-critical lab and imaging results will be communicated to you by Albeo Technologieshart, letter or phone within 4 business days after the clinic has received the results. If you do not hear from us within 7 days, please contact the clinic through Hydrelist or phone. If you have a critical or abnormal lab result, we will notify you by phone as soon as possible.  Submit refill requests through [employer] or call your pharmacy and they will forward the refill request to us. Please allow 3 business days for your refill to be completed.          Additional Information About Your Visit        MyChart Information     [employer] gives you secure access to your electronic health record. If you see a primary care provider, you can also send messages to your care team and make appointments. If you have questions, please call your primary care clinic.  If you do not have a primary care provider, please call [phone] and they will assist you.        Care EveryWhere ID     This is your Care EveryWhere ID. This could be used by other organizations to access your [facility] medical records  [id number]         Blood Pressure from Last 3 Encounters:   06/01/18 121/81   05/07/18 129/88   04/05/18 114/77    Weight from Last 3 Encounters:   06/01/18 71.7 kg (158 lb)   05/07/18 71.7 kg (158 lb)   04/25/18 71.7 kg (158 lb)              We Performed the Following     MANUAL THER  TECH,1+REGIONS, 15 MIN     NEUROMUSCULAR RE-EDUCATION     ULTRASOUND THERAPY        Primary Care Provider Office Phone # Fax #    Kendall Jack Garza [phone] [address] [address]        Equal Access to Services     [facility] : Hadii aad ku hadasho Soomaali, waaxda luqadaha, qaybta kaalmada adeegyada, waxay carinein tylern maile castanedasaryarlene morse . So [facility] [phone].    ATENCIÓN: Si habla español, tiene a george disposición servicios gratuitos de asistencia lingüística. Llame al [phone].    We comply with applicable federal civil rights laws and [place] laws. We do not discriminate on the basis of race, color, national origin, age, disability, sex, sexual orientation, or gender identity.            Thank you!     Thank you for choosing [facility]  for your care. Our goal is always to provide you with excellent care. Hearing back from our patients is one way we can continue to improve our services. Please take a few minutes to complete the written survey that you may receive in the mail after your visit with us. Thank you!             Your Updated Medication List - Protect others around you: Learn how to safely use, store and throw away your medicines at www.disposemymeds.org.          This list is accurate as of 7/17/18  2:11 PM.  Always use your most recent med list.                   Brand Name Dispense Instructions for use Diagnosis    acetaminophen-codeine 300-30 MG per tablet    TYLENOL #3    10 tablet    Take 1 tablet by mouth every 14 days Use 1 tablet every 2 weeks for migraine.  This is a 4 month supply    Migraine without aura and without status migrainosus, not intractable       azithromycin 250 MG tablet    ZITHROMAX    6 tablet    Two tablets first day, then one tablet daily for four days.    Acute bronchitis, unspecified organism       FLUoxetine 20 MG capsule    PROzac    90 capsule    TAKE 1 CAPSULE (10 MG) BY MOUTH DAILY         fluticasone 50 MCG/ACT spray    FLONASE    1 g    Spray 1-2 sprays into both nostrils nightly as needed for rhinitis or allergies    Post-nasal drip       guaiFENesin-codeine 100-10 MG/5ML Soln solution    ROBITUSSIN AC    120 mL    Take 5 mLs by mouth every 4 hours as needed for cough    Acute bronchitis, unspecified organism       IBUPROFEN PO           order for DME     1 Device    Equipment being ordered: TENS    Radial styloid tenosynovitis       * traMADol 50 MG tablet    ULTRAM    30 tablet    Take 1 tablet (50 mg) by mouth nightly as needed for severe pain    Radial styloid tenosynovitis       * traMADol 50 MG tablet    ULTRAM    60 tablet    Take 1 tablet (50 mg) by mouth 2 times daily as needed for moderate pain    Right wrist pain       * Notice:  This list has 2 medication(s) that are the same as other medications prescribed for you. Read the directions carefully, and ask your doctor or other care provider to review them with you.

## 2018-07-19 ENCOUNTER — MYC REFILL (OUTPATIENT)
Dept: ANESTHESIOLOGY | Facility: CLINIC | Age: 43
End: 2018-07-19

## 2018-07-19 DIAGNOSIS — M25.531 RIGHT WRIST PAIN: ICD-10-CM

## 2018-07-23 NOTE — TELEPHONE ENCOUNTER
Message from [employer]ell:  Ebony Rodriguez, RN Thu Jul 19, 2018 12:35 PM        ----- Message -----   From: Renetta Lauren   Sent: 7/19/2018 11:11 AM   To: Adult [facility]  Subject: Medication Renewal Request     Original authorizing provider: MD Renetta Pack would like a refill of the following medications:  traMADol (ULTRAM) 50 MG tablet [Beverly Kebede MD]    Preferred pharmacy: [facility] #[id number] [address]    Comment:

## 2018-07-23 NOTE — TELEPHONE ENCOUNTER
Pt advised that Dr. Kebede will need to authorize tramadol refill, as she has not been seen since 6/1/18 and pt has had increased dosage since then.  Pt assisted with scheduling f/u 8/8/18.      Ebony Rodriguez, RN, BSN

## 2018-07-24 NOTE — TELEPHONE ENCOUNTER
[facility]    Phone Message    May a detailed message be left on voicemail: yes    Reason for Call: Other: Pt returned call to Ebony. Pt would like to discuss script of Tramadol. Pt stated that on her paperwork after her last visit with Dr. Kebede that she should follow up in three months, but then she was told that it says 1 month somewhere else. Pt would like to know when she should follow up, and she would like to discuss the tramadol. Pt stated she will be going out of town tomorrow and will be out. Pt stated she only takes the script at night to help with the pain so she can sleep     Action Taken: Message routed to:  [facility] pain clinic

## 2018-07-27 RX ORDER — TRAMADOL HYDROCHLORIDE 50 MG/1
50 TABLET ORAL 2 TIMES DAILY PRN
Qty: 60 TABLET | Refills: 0 | Status: CANCELLED | OUTPATIENT
Start: 2018-07-27

## 2018-08-08 ENCOUNTER — OFFICE VISIT (OUTPATIENT)
Dept: ANESTHESIOLOGY | Facility: CLINIC | Age: 43
End: 2018-08-08
Payer: COMMERCIAL

## 2018-08-08 ENCOUNTER — TELEPHONE (OUTPATIENT)
Dept: ANESTHESIOLOGY | Facility: CLINIC | Age: 43
End: 2018-08-08

## 2018-08-08 VITALS — HEART RATE: 62 BPM | SYSTOLIC BLOOD PRESSURE: 151 MMHG | DIASTOLIC BLOOD PRESSURE: 88 MMHG | OXYGEN SATURATION: 95 %

## 2018-08-08 DIAGNOSIS — M65.4 RADIAL STYLOID TENOSYNOVITIS: ICD-10-CM

## 2018-08-08 DIAGNOSIS — M25.531 RIGHT WRIST PAIN: ICD-10-CM

## 2018-08-08 RX ORDER — TRAMADOL HYDROCHLORIDE 50 MG/1
50 TABLET ORAL 2 TIMES DAILY PRN
Qty: 60 TABLET | Refills: 2 | Status: SHIPPED | OUTPATIENT
Start: 2018-08-08 | End: 2018-11-08

## 2018-08-08 ASSESSMENT — ANXIETY QUESTIONNAIRES
1. FEELING NERVOUS, ANXIOUS, OR ON EDGE: NOT AT ALL
2. NOT BEING ABLE TO STOP OR CONTROL WORRYING: NOT AT ALL
GAD7 TOTAL SCORE: 1
7. FEELING AFRAID AS IF SOMETHING AWFUL MIGHT HAPPEN: NOT AT ALL
5. BEING SO RESTLESS THAT IT IS HARD TO SIT STILL: NOT AT ALL
7. FEELING AFRAID AS IF SOMETHING AWFUL MIGHT HAPPEN: NOT AT ALL
4. TROUBLE RELAXING: SEVERAL DAYS
6. BECOMING EASILY ANNOYED OR IRRITABLE: NOT AT ALL
GAD7 TOTAL SCORE: 1
3. WORRYING TOO MUCH ABOUT DIFFERENT THINGS: NOT AT ALL

## 2018-08-08 ASSESSMENT — PAIN SCALES - GENERAL: PAINLEVEL: MILD PAIN (3)

## 2018-08-08 NOTE — LETTER
2018       RE: Renetta Lauren  [address]  [facility] [address]     Dear Colleague,    Thank you for referring your patient, Renetta Lauren, to the [facility] at [facility]. Please see a copy of my visit note below.    [facility] Adult Chronic Pain Service Outpatient Consultation    Renetta Lauren is a 42 year old female with ongoing wrist pain.    She developed right wrist pain about 2 years ago when her son was born.  She was hospitalized for 3 months antepartum for  labor and on bedrest for 3 months.  She was then sleeping in the NICU for a month after he son was born.  There was no specific injury or event leading to the wrist pain.   Pain ranges in intensity from 5 to 10, and averages 6 on the zero to ten numerical rating scale  Quality of the pain is sharp and stabbing  Aggravating factors include lifting her child and car seat  Relieving factors include tramadol, seems to be better during the day when she is busy.  She was previously taking tramadol 50 mg less than once per day, but on  called for a refill stating she was at that time taking it BID.    IMPACT OF PAIN: continuously uncomfortable in evenings when she is trying to relax/sleep, painful to lift and do activities required for caring for small child     DIAGNOSTIC TESTS:   Xray of the right wrist showing no abnormality- May, 2017  MRI upper extremity ordered but not completed    PAST PAIN TREATMENT:   PT - helpful   Brace - helpful   Tramadol 50 mg at night   Diet changes, now a vegetarian, which helps     CURRENT MEDICATIONS:   Current Outpatient Prescriptions Ordered in [facility]   Medication Sig Dispense Refill     acetaminophen-codeine (TYLENOL #3) 300-30 MG per tablet Take 1 tablet by mouth every 14 days Use 1 tablet every 2 weeks for migraine.  This is a 4 month supply 10 tablet 0     azithromycin (ZITHROMAX) 250 MG tablet Two tablets first day, then one  tablet daily for four days. 6 tablet 0     FLUoxetine (PROZAC) 20 MG capsule TAKE 1 CAPSULE (10 MG) BY MOUTH DAILY 90 capsule 1     fluticasone (FLONASE) 50 MCG/ACT nasal spray Spray 1-2 sprays into both nostrils nightly as needed for rhinitis or allergies 1 g 3     guaiFENesin-codeine (ROBITUSSIN AC) 100-10 MG/5ML SOLN solution Take 5 mLs by mouth every 4 hours as needed for cough 120 mL 0     IBUPROFEN PO        order for DME Equipment being ordered: TENS 1 Device 0     traMADol (ULTRAM) 50 MG tablet Take 1 tablet (50 mg) by mouth 2 times daily as needed for moderate pain 60 tablet 0     traMADol (ULTRAM) 50 MG tablet Take 1 tablet (50 mg) by mouth nightly as needed for severe pain 30 tablet 0     No current Epic-ordered facility-administered medications on file.        ALLERGIES:   Allergies   Allergen Reactions     Nkda [No Known Drug Allergies]        PAST MEDICAL AND PSYCHIATRIC HISTORY:  has a past medical history of Asthma; Depression (ages 18-21); Depressive disorder (ages 18-21); Miscarriage (Nov 2011); Ulcer (H); and UTI (lower urinary tract infection).    PAST SURGICAL HISTORY:  has a past surgical history that includes APPENDECTOMY (1991); Operative hysteroscopy with morcellator (N/A, 11/23/2015); and STOMACH SURGERY PROCEDURE UNLISTED (1990).    SOCIAL HISTORY: Please see the separate psychosocial evaluation by the health psychologist for additional information.    REVIEW OF SYSTEMS: Please refer to the patient's health questionnaire which I reviewed in detail with her.  This review covered 13 bodily systems.     PHYSICAL EXAMINATION:  VITAL SIGNS:  not currently breastfeeding.  CONSTITUTIONAL/GENERAL APPEARANCE/:    In no apparent distress   No pain behaviors  EYES:   Extra ocular movements intact   No scleral icterus  ENT/NECK:   Neck range of motion full and normal for extension, flexion, bilateral rotation, and bilateral tilt.   Neck supple  RESPIRATORY:   No respiratory distress  CARDIOVASCULAR:    Regular rate, rhythm  MUSCULOSKELETAL/BACK/SPINE/EXTREMITIES:   Neck range of motion full and normal for extension, flexion, bilateral rotation, and bilateral tilt.   Lumbar range of motion full and normal for extension, flexion, bilateral rotation, and bilateral tilt.   Full range of motion of all 4 extremities, no swelling or erythema of joints   Right radial styloid is enlarged and tender to palpation. No erythema or swelling, full range of motion of bilateral wrists.   NEURO:    AAOx3  Cranial nerves II-XII grossly intact   Moves all 4 extremities equally and normally   SKIN/VASCULAR/AUTONOMIC EXAM:   1. Rashes: none noted  2. Lesions: none  3. Skin temperature asymmetry: none  4. Skin color asymmetry: none  PSYCHIATRIC/BEHAVIORAL/OBSERVATIONS:    Judgment/Insight good   Orientation good   Memory intact, and is a decent historian     Mood and affect appear appropriate    ASSESSMENT:   Right wrist tenosynovitis   Chronic continuous use of opiates for therapeutic purposes       TREATMENT RECOMMENDATIONS/PLAN:  1. Medications: She would like to decrease her tramadol intake to twice per week from about 5-7 times per week.  I agree with this plan, and I will refill her tramadol in the meantime.  I will for now give her a supply of #30 pills, but she will try to take less than once per day.  She will return to clinic in 30 days and we will assess how many pills she needed.  She will bring in left over pills.  We will obtain a UDS today and have her sign an opiate contract with our clinic today.    She would rather not replace the tramadol with any other non-opiate medications.    2. Procedures: She would like to avoid any procedural interventions.  She will be given an order for TENS unit and referral for instructional edu.   3. Images/tests: none additional   4. Referrals: referrals made to PT hand therapy, and one-time referral to PT for TENS unit instruction.  Referral also made to Health Psychology for training  in cognitive behavioral therapy and mindfulness.   5. PT/OT, exercise program: PT hand therapy referral   5. Follow up: 30 days     Thank you for the opportunity to care for this patient today,    Again, thank you for allowing me to participate in the care of your patient.      Sincerely,    Beverly Kebede MD

## 2018-08-08 NOTE — MR AVS SNAPSHOT
After Visit Summary   8/8/2018    Renetta Lauren    MRN: [id number]           Patient Information     Date Of Birth          1975        Visit Information        Provider Department      8/8/2018 10:40 AM Beverly Kebede MD [facility]        Today's Diagnoses     Radial styloid tenosynovitis        Right wrist pain          Care Instructions    1. Apply Flector patch for pain relief    2. Tramadol refilled     Follow up:    3 months with Dr. Kebede for medication check. Call back next month to make this appointment.    To speak with a nurse, schedule/reschedule/cancel a clinic appointment, or request a medication refill call: [phone]     You can also reach us by M360LOHAS outdoors: [email or web address]    For refills, please call on Monday, 1 week before your medication runs out. The doctors are not always in clinic, so this gives us time to get your prescriptions ready.  Please let us know the name of the medication you are requesting a refill of.                                     Follow-ups after your visit        Who to contact     Please call your clinic at [phone] to:    Ask questions about your health    Make or cancel appointments    Discuss your medicines    Learn about your test results    Speak to your doctor            Additional Information About Your Visit        M360LOHAS outdoors Information     M360LOHAS outdoors gives you secure access to your electronic health record. If you see a primary care provider, you can also send messages to your care team and make appointments. If you have questions, please call your primary care clinic.  If you do not have a primary care provider, please call [phone] and they will assist you.      M360LOHAS outdoors is an electronic gateway that provides easy, online access to your medical records. With M360LOHAS outdoors, you can request a clinic appointment, read your test results, renew a prescription or communicate  with your care team.     To access your existing account, please contact your [facility] or call [phone] for assistance.        Care EveryWhere ID     This is your Care EveryWhere ID. This could be used by other organizations to access your [place] medical records  [id number]        Your Vitals Were     Pulse Pulse Oximetry                62 95%           Blood Pressure from Last 3 Encounters:   08/08/18 151/88   06/01/18 121/81   05/07/18 129/88    Weight from Last 3 Encounters:   06/01/18 71.7 kg (158 lb)   05/07/18 71.7 kg (158 lb)   04/25/18 71.7 kg (158 lb)              Today, you had the following     No orders found for display         Today's Medication Changes          These changes are accurate as of 8/8/18 11:49 AM.  If you have any questions, ask your nurse or doctor.               Start taking these medicines.        Dose/Directions    diclofenac 1.3 % Patch   Commonly known as:  FLECTOR   Used for:  Radial styloid tenosynovitis        Dose:  1 patch   Place 1 patch onto the skin 2 times daily   Quantity:  60 patch   Refills:  2            Where to get your medicines      These medications were sent to [facility] [address] [address]    Hours:  TRANSPLANT PHONE NUMBER [phone] Phone:  [phone]     diclofenac 1.3 % Patch         Some of these will need a paper prescription and others can be bought over the counter.  Ask your nurse if you have questions.     Bring a paper prescription for each of these medications     traMADol 50 MG tablet               Information about OPIOIDS     PRESCRIPTION OPIOIDS: WHAT YOU NEED TO KNOW   We gave you an opioid (narcotic) pain medicine. It is important to manage your pain, but opioids are not always the best choice. You should first try all the other options your care team gave you. Take this medicine for as short a time  (and as few doses) as possible.    Some activities can increase your pain, such as bandage changes or therapy sessions. It may help to take your pain medicine 30 to 60 minutes before these activities. Reduce your stress by getting enough sleep, working on hobbies you enjoy and practicing relaxation or meditation. Talk to your care team about ways to manage your pain beyond prescription opioids.    These medicines have risks:    DO NOT drive when on new or higher doses of pain medicine. These medicines can affect your alertness and reaction times, and you could be arrested for driving under the influence (DUI). If you need to use opioids long-term, talk to your care team about driving.    DO NOT operate heavy machinery    DO NOT do any other dangerous activities while taking these medicines.    DO NOT drink any alcohol while taking these medicines.     If the opioid prescribed includes acetaminophen, DO NOT take with any other medicines that contain acetaminophen. Read all labels carefully. Look for the word  acetaminophen  or  Tylenol.  Ask your pharmacist if you have questions or are unsure.    You can get addicted to pain medicines, especially if you have a history of addiction (chemical, alcohol or substance dependence). Talk to your care team about ways to reduce this risk.    All opioids tend to cause constipation. Drink plenty of water and eat foods that have a lot of fiber, such as fruits, vegetables, prune juice, apple juice and high-fiber cereal. Take a laxative (Miralax, milk of magnesia, Colace, Senna) if you don t move your bowels at least every other day. Other side effects include upset stomach, sleepiness, dizziness, throwing up, tolerance (needing more of the medicine to have the same effect), physical dependence and slowed breathing.    Store your pills in a secure place, locked if possible. We will not replace any lost or stolen medicine. If you don t finish your medicine, please throw away  (dispose) as directed by your pharmacist. The [place] Pollution Control Agency has more information about safe disposal: [email or web address].[facility].mn.us/living-green/managing-unwanted-medications         Primary Care Provider Office Phone # Fax #    Clayjillian Jack Garza [phone] [address]  [facility] [address]        Equal Access to Services     GAYATHRI BACON : Hadii aad ku hadasho Soomaali, waaxda luqadaha, qaybta kaalmada adeegyada, waxay idiin hayaan adeeg khtad laandreinan ah. So [facility] [phone].    ATENCIÓN: Si dinorah watson, tiene a george disposición servicios gratuitos de asistencia lingüística. Laura al [phone].    We comply with applicable federal civil rights laws and [place] laws. We do not discriminate on the basis of race, color, national origin, age, disability, sex, sexual orientation, or gender identity.            Thank you!     Thank you for choosing [facility]  for your care. Our goal is always to provide you with excellent care. Hearing back from our patients is one way we can continue to improve our services. Please take a few minutes to complete the written survey that you may receive in the mail after your visit with us. Thank you!             Your Updated Medication List - Protect others around you: Learn how to safely use, store and throw away your medicines at www.disposemymeds.org.          This list is accurate as of 8/8/18 11:49 AM.  Always use your most recent med list.                   Brand Name Dispense Instructions for use Diagnosis    acetaminophen-codeine 300-30 MG per tablet    TYLENOL #3    10 tablet    Take 1 tablet by mouth every 14 days Use 1 tablet every 2 weeks for migraine.  This is a 4 month supply    Migraine without aura and without status migrainosus, not intractable       diclofenac 1.3 % Patch    FLECTOR    60 patch    Place 1 patch onto the skin 2 times daily    Radial styloid  tenosynovitis       FLUoxetine 20 MG capsule    PROzac    90 capsule    TAKE 1 CAPSULE (10 MG) BY MOUTH DAILY        fluticasone 50 MCG/ACT spray    FLONASE    1 g    Spray 1-2 sprays into both nostrils nightly as needed for rhinitis or allergies    Post-nasal drip       guaiFENesin-codeine 100-10 MG/5ML Soln solution    ROBITUSSIN AC    120 mL    Take 5 mLs by mouth every 4 hours as needed for cough    Acute bronchitis, unspecified organism       IBUPROFEN PO           order for DME     1 Device    Equipment being ordered: TENS    Radial styloid tenosynovitis       * traMADol 50 MG tablet    ULTRAM    30 tablet    Take 1 tablet (50 mg) by mouth nightly as needed for severe pain    Radial styloid tenosynovitis       * traMADol 50 MG tablet    ULTRAM    60 tablet    Take 1 tablet (50 mg) by mouth 2 times daily as needed for moderate pain    Right wrist pain       * Notice:  This list has 2 medication(s) that are the same as other medications prescribed for you. Read the directions carefully, and ask your doctor or other care provider to review them with you.

## 2018-08-08 NOTE — PROGRESS NOTES
[facility] Outpatient Consultation    Renetta Lauren is a 42 year old female with ongoing wrist pain.    She developed right wrist pain about 2 years ago when her son was born.  She was hospitalized for 3 months antepartum for  labor and on bedrest for 3 months.  She was then sleeping in the NICU for a month after he son was born.  There was no specific injury or event leading to the wrist pain.   Pain ranges in intensity from 5 to 10, and averages 6 on the zero to ten numerical rating scale  Quality of the pain is sharp and stabbing  Aggravating factors include lifting her child and car seat  Relieving factors include tramadol, seems to be better during the day when she is busy.  She was previously taking tramadol 50 mg less than once per day, but on  called for a refill stating she was at that time taking it BID.    IMPACT OF PAIN: continuously uncomfortable in evenings when she is trying to relax/sleep, painful to lift and do activities required for caring for small child     DIAGNOSTIC TESTS:   Xray of the right wrist showing no abnormality- May, 2017  MRI upper extremity ordered but not completed    PAST PAIN TREATMENT:   PT - helpful   Brace - helpful   Tramadol 50 mg at night   Diet changes, now a vegetarian, which helps     CURRENT MEDICATIONS:   Current Outpatient Prescriptions Ordered in [facility]   Medication Sig Dispense Refill     acetaminophen-codeine (TYLENOL #3) 300-30 MG per tablet Take 1 tablet by mouth every 14 days Use 1 tablet every 2 weeks for migraine.  This is a 4 month supply 10 tablet 0     azithromycin (ZITHROMAX) 250 MG tablet Two tablets first day, then one tablet daily for four days. 6 tablet 0     FLUoxetine (PROZAC) 20 MG capsule TAKE 1 CAPSULE (10 MG) BY MOUTH DAILY 90 capsule 1     fluticasone (FLONASE) 50 MCG/ACT nasal spray Spray 1-2 sprays into both nostrils nightly as needed for rhinitis or allergies 1 g 3     guaiFENesin-codeine (ROBITUSSIN AC) 100-10  MG/5ML SOLN solution Take 5 mLs by mouth every 4 hours as needed for cough 120 mL 0     IBUPROFEN PO        order for DME Equipment being ordered: TENS 1 Device 0     traMADol (ULTRAM) 50 MG tablet Take 1 tablet (50 mg) by mouth 2 times daily as needed for moderate pain 60 tablet 0     traMADol (ULTRAM) 50 MG tablet Take 1 tablet (50 mg) by mouth nightly as needed for severe pain 30 tablet 0     No current Epic-ordered facility-administered medications on file.        ALLERGIES:   Allergies   Allergen Reactions     Nkda [No Known Drug Allergies]        PAST MEDICAL AND PSYCHIATRIC HISTORY:  has a past medical history of Asthma; Depression (ages 18-21); Depressive disorder (ages 18-21); Miscarriage (Nov 2011); Ulcer (H); and UTI (lower urinary tract infection).    PAST SURGICAL HISTORY:  has a past surgical history that includes APPENDECTOMY (1991); Operative hysteroscopy with morcellator (N/A, 11/23/2015); and STOMACH SURGERY PROCEDURE UNLISTED (1990).    SOCIAL HISTORY: Please see the separate psychosocial evaluation by the health psychologist for additional information.    REVIEW OF SYSTEMS: Please refer to the patient's health questionnaire which I reviewed in detail with her.  This review covered 13 bodily systems.     PHYSICAL EXAMINATION:  VITAL SIGNS:  not currently breastfeeding.  CONSTITUTIONAL/GENERAL APPEARANCE/:    In no apparent distress   No pain behaviors  EYES:   Extra ocular movements intact   No scleral icterus  ENT/NECK:   Neck range of motion full and normal for extension, flexion, bilateral rotation, and bilateral tilt.   Neck supple  RESPIRATORY:   No respiratory distress  CARDIOVASCULAR:   Regular rate, rhythm  MUSCULOSKELETAL/BACK/SPINE/EXTREMITIES:   Neck range of motion full and normal for extension, flexion, bilateral rotation, and bilateral tilt.   Lumbar range of motion full and normal for extension, flexion, bilateral rotation, and bilateral tilt.   Full range of motion of all 4  extremities, no swelling or erythema of joints   Right radial styloid is enlarged and tender to palpation. No erythema or swelling, full range of motion of bilateral wrists.   NEURO:    AAOx3  Cranial nerves II-XII grossly intact   Moves all 4 extremities equally and normally   SKIN/VASCULAR/AUTONOMIC EXAM:   1. Rashes: none noted  2. Lesions: none  3. Skin temperature asymmetry: none  4. Skin color asymmetry: none  PSYCHIATRIC/BEHAVIORAL/OBSERVATIONS:    Judgment/Insight good   Orientation good   Memory intact, and is a decent historian     Mood and affect appear appropriate    ASSESSMENT:   Right wrist tenosynovitis   Chronic continuous use of opiates for therapeutic purposes       TREATMENT RECOMMENDATIONS/PLAN:  1. Medications: She would like to decrease her tramadol intake to twice per week from about 5-7 times per week.  I agree with this plan, and I will refill her tramadol in the meantime.  I will for now give her a supply of #30 pills, but she will try to take less than once per day.  She will return to clinic in 30 days and we will assess how many pills she needed.  She will bring in left over pills.  We will obtain a UDS today and have her sign an opiate contract with our clinic today.    She would rather not replace the tramadol with any other non-opiate medications.    2. Procedures: She would like to avoid any procedural interventions.  She will be given an order for TENS unit and referral for instructional edu.   3. Images/tests: none additional   4. Referrals: referrals made to PT hand therapy, and one-time referral to PT for TENS unit instruction.  Referral also made to Health Psychology for training in cognitive behavioral therapy and mindfulness.   5. PT/OT, exercise program: PT hand therapy referral   5. Follow up: 30 days     Thank you for the opportunity to care for this patient today,    Beverly Kebede MD      Answers for HPI/ROS submitted by the patient on 8/8/2018   ANDREW 7 TOTAL SCORE:  1  PHQ-2 Score: 0

## 2018-08-08 NOTE — PATIENT INSTRUCTIONS
1. Apply Flector patch for pain relief    2. Tramadol refilled     Follow up:    3 months with Dr. Kebede for medication check. Call back next month to make this appointment.    To speak with a nurse, schedule/reschedule/cancel a clinic appointment, or request a medication refill call: [phone]     You can also reach us by Wikisway: [email or web address]    For refills, please call on Monday, 1 week before your medication runs out. The doctors are not always in clinic, so this gives us time to get your prescriptions ready.  Please let us know the name of the medication you are requesting a refill of.

## 2018-08-09 NOTE — TELEPHONE ENCOUNTER
Prior Authorization Approval    Authorization Effective Date: 7/10/2018  Authorization Expiration Date: 8/9/2019  Medication: Flector 1.3% Patches-APPROVED  Approved Dose/Quantity:   Reference #: [id number]   Insurance Company: [employer] - Phone [phone] Fax [phone]  Expected CoPay:       CoPay Card Available:      Foundation Assistance Needed:    Which Pharmacy is filling the prescription (Not needed for infusion/clinic administered): [facility] [place] [id number] [facility] [id number]  Pharmacy Notified: Yes  Patient Notified: No-Pharmacy will notify patient when ready.

## 2018-08-09 NOTE — TELEPHONE ENCOUNTER
Central Prior Authorization Team   Phone: [phone]      PA Initiation    Medication: Flector 1.3% Patches-PA Initiated  Insurance Company: [employer] - Phone [phone] Fax [phone]  Pharmacy Filling the Rx: [facility] [place], [place] - [address] [place] [address] [id number]  Filling Pharmacy Phone: [phone]  Filling Pharmacy Fax: [phone]  Start Date: 8/9/2018

## 2018-08-09 NOTE — TELEPHONE ENCOUNTER
[facility] Prior Authorization Team Request    Medication: Flector 1.3% Patches  Dosing: Apply and remove 1 patch onto the skin twice daily.  NDC (required for Medicaid members): [address]-10    Insurance   BIN: [id number]  PCN: MA  Grp: [id number]  ID: [id number]    CoverMyMeds Key (if applicable):     Additional documentation:     Filling Pharmacy: [facility]  Phone Number: [phone]  Contact: [facility] [employer] [employer] (P [id number]) please send all responses to this pool.  Pharmacy NPI (required for Medicaid members): [id number]

## 2018-08-10 ASSESSMENT — ANXIETY QUESTIONNAIRES: GAD7 TOTAL SCORE: 1

## 2018-09-20 ENCOUNTER — OFFICE VISIT (OUTPATIENT)
Dept: FAMILY MEDICINE | Facility: CLINIC | Age: 43
End: 2018-09-20
Payer: COMMERCIAL

## 2018-09-20 VITALS
DIASTOLIC BLOOD PRESSURE: 83 MMHG | HEART RATE: 81 BPM | BODY MASS INDEX: 22.94 KG/M2 | SYSTOLIC BLOOD PRESSURE: 116 MMHG | WEIGHT: 160.2 LBS | HEIGHT: 70 IN | OXYGEN SATURATION: 98 % | TEMPERATURE: 99.9 F

## 2018-09-20 DIAGNOSIS — K29.00 ACUTE SUPERFICIAL GASTRITIS WITHOUT HEMORRHAGE: Primary | ICD-10-CM

## 2018-09-20 DIAGNOSIS — Z23 NEED FOR IMMUNIZATION AGAINST INFLUENZA: ICD-10-CM

## 2018-09-20 PROCEDURE — 99213 OFFICE O/P EST LOW 20 MIN: CPT | Mod: 25 | Performed by: FAMILY MEDICINE

## 2018-09-20 PROCEDURE — 90471 IMMUNIZATION ADMIN: CPT | Performed by: FAMILY MEDICINE

## 2018-09-20 PROCEDURE — 90686 IIV4 VACC NO PRSV 0.5 ML IM: CPT | Performed by: FAMILY MEDICINE

## 2018-09-20 RX ORDER — FAMOTIDINE 20 MG/1
20 TABLET, FILM COATED ORAL 2 TIMES DAILY
Qty: 30 TABLET | Refills: 1 | Status: SHIPPED | OUTPATIENT
Start: 2018-09-20 | End: 2019-01-16

## 2018-09-20 NOTE — PROGRESS NOTES
SUBJECTIVE:   Renetta Lauren is a 43 year old female who presents to clinic today for the following health issues:    Abdominal Pain    Duration: x2 days    Description (location/character/radiation): middle to upper stomach        Associated flank pain: None    Intensity:  Moderate to severe    Accompanying signs and symptoms:        Fever/Chills: no        Gas/Bloating: YES- belching and bloating        Nausea/vomitting: YES- nausea       Diarrhea: no        Dysuria or Hematuria: no     History (previous similar pain/trauma/previous testing): ulcer over 20 years ago, feels similar to those sx    Precipitating or alleviating factors:       Pain worse with eating/BM/urination: no       Pain relieved by BM: no     Therapies tried and outcome: pepto bismol and tramadol - helped with sx temporarily     LMP:    The patient presents to clinic to address abdominal concerns.  She has been having a dull moderate to severe epigastric pain.  She has been using ibuprofen 200- 400 mg (every 6-8 hours  to Tuesday) for menstral cramps. She denies any GERD symptoms. Denies any pelvic pain or pressure. Denies any changes to her bowel habits. Last BM was earlier today. Denies any nausea or vomiting.    Problem list and histories reviewed & adjusted, as indicated.  Additional history: as documented    Patient Active Problem List   Diagnosis     CARDIOVASCULAR SCREENING; LDL GOAL LESS THAN 160     PPROM Elevated Risk Factor     Need for Tdap vaccination      premature rupture of membranes (PPROM) with onset of labor after 24 hours of rupture in second trimester, antepartum     Right wrist pain     Radial styloid tenosynovitis of right hand     Post-nasal drip     Screening for cervical cancer     Migraine without aura and without status migrainosus, not intractable     Human papilloma virus (HPV) infection     Paresthesia of right arm     Past Surgical History:   Procedure Laterality Date     C APPENDECTOMY    "    C STOMACH SURGERY PROCEDURE UNLISTED  1990    Appendectomy     OPERATIVE HYSTEROSCOPY WITH MORCELLATOR N/A 11/23/2015    Procedure: OPERATIVE HYSTEROSCOPY WITH MORCELLATOR;  Surgeon: Reyna Marshall MD;  Location:  OR       Social History   Substance Use Topics     Smoking status: Never Smoker     Smokeless tobacco: Never Used     Alcohol use 0.0 oz/week      Comment: 1-2 glasses of wine per week     Family History   Problem Relation Age of Onset     Family History Negative Other      Hypertension Mother      Other Cancer Mother      Cancer Mother      Family History Negative Father      Coronary Artery Disease Maternal Grandfather            Reviewed and updated as needed this visit by clinical staff       Reviewed and updated as needed this visit by Provider         ROS:  Constitutional, HEENT, cardiovascular, pulmonary, gi and gu systems are negative, except as otherwise noted.    OBJECTIVE:     /83  Pulse 81  Temp 99.9  F (37.7  C) (Oral)  Ht 5' 10\" (1.778 m)  Wt 160 lb 3.2 oz (72.7 kg)  LMP 09/16/2018 (Approximate)  SpO2 98%  BMI 22.99 kg/m2  Body mass index is 22.99 kg/(m^2).  GENERAL: healthy, alert and no distress  RESP: lungs clear to auscultation - no rales, rhonchi or wheezes  CV: regular rate and rhythm, normal S1 S2, no S3 or S4, no murmur, click or rub, no peripheral edema and peripheral pulses strong  ABDOMEN: soft, nontender, no hepatosplenomegaly, no masses and bowel sounds normal  MS: no gross musculoskeletal defects noted, no edema    Diagnostic Test Results:  No results found for this or any previous visit (from the past 24 hour(s)).    ASSESSMENT/PLAN:   1. Acute superficial gastritis without hemorrhage  Assessment: Patient presents to clinic for evaluation of abdominal pain.  History significant for excessive NSAIDs use over the past 3-4 days.  Exam and history are consistent with gastritis.  The patient was advised to start with famotidine 20 mg once daily and " increase to 40 mg pain persists.  Would consider starting a PPI.  Plan:  - H Pylori antigen, stool; Future  - famotidine (PEPCID) 20 MG tablet; Take 1 tablet (20 mg) by mouth 2 times daily Start with once daily and increase to TWICE DAILY if pain persists.  Dispense: 30 tablet; Refill: 1    2. Need for immunization against influenza  - FLU VACCINE, 3 YRS +, IM (FLUZONE)  - ADMIN INFLUENZA VIRUS VAC  - ADMIN 1st VACCINE      Kendall Garza MD  [facility]

## 2018-09-20 NOTE — MR AVS SNAPSHOT
After Visit Summary   9/20/2018    Renetta Lauren    MRN: [id number]           Patient Information     Date Of Birth          1975        Visit Information        Provider Department      9/20/2018 2:40 PM Kendall Graza MD [facility]        Today's Diagnoses     Other acute gastritis without hemorrhage    -  1      Care Instructions      Gastritis or Ulcer, No Antibiotic Treatment    Gastritis is irritation and inflammation of the stomach lining. This means the lining is red and swollen. An ulcer is an open sore in the lining of the stomach. It may also occur in the first part of the small intestine (duodenum). The causes and symptoms of gastritis and ulcers are very similar.  Causes and risk factors for both problems can include:    Long-term use of nonsteroidal anti-inflammatory drugs (NSAIDs), such as aspirin and ibuprofen    H. pylori bacteria infection    Tobacco use    Alcohol use  Symptoms for both problems can include:    Dull or burning pain in the upper part of the belly    Loss of appetite    Heartburn or upset stomach    Frequent burping    Bloated feeling    Nausea with or without vomiting  You likely had an evaluation to help determine the exact cause and extent of your problem. This may have included a health history, exam, and certain tests.  Results showed that your problem is not due to H. pylori infection. For this reason, no antibiotics are needed as part of your treatment.  Whether your problem is found to be gastritis or an ulcer, you will still need to take other medicines, however. You will also need to follow instructions to help reduce stomach irritation so your stomach can heal.   Home care    Take any medicines you re prescribed exactly as directed. Common medicines used to treat gastritis include:  ? Antacids. These help neutralize the normal acids in your stomach.  ? Proton pump inhibitors. These block your stomach from making any  acid.  ? H2 blockers.These reduce the amount of acid your stomach makes.  ? Bismuth subsalicylate. This helps protect the lining of your stomach from acid.    Don't take any NSAIDs during your treatment. If you take NSAID to help treat other health problems, tell your healthcare provider. He or she may need to adjust your medicine plan or change the dosage.    Don t use tobacco. Also don t drink alcohol. These products can increase the amount of acid your stomach makes. This can delay healing. It can also worsen symptoms.  Follow-up care  Follow up with your healthcare provider, or as advised. In some cases, further testing may be needed.  When to seek medical advice  Call your healthcare provider right away if any of these occur:    Fever of 100.4 F (38 C) or higher, or as directed by your healthcare provider    Stomach pain that worsens or moves to the lower right part of abdomen    Extreme fatigue    Weakness or dizziness    Continued weight loss    Frequent vomiting, blood in your vomit, or coffee ground-like substance in your vomit    Black, tarry, or bloody stools  Call 911  Call 911 if any of these occur:    Chest pain appears or worsens, or spreads to the back, neck, shoulder, or arm    Unusually fast heart rate    Trouble breathing or swallowing    Confusion    Extreme drowsiness or trouble waking up    Fainting    Large amounts of blood present in vomit or stool  Date Last Reviewed: 6/16/2015 2000-2017 [employer]. [address] [place], [place] [address]. All rights reserved. This information is not intended as a substitute for professional medical care. Always follow your healthcare professional's instructions.                Follow-ups after your visit        Follow-up notes from your care team     Return in about 2 weeks (around 10/4/2018).      Future tests that were ordered for you today     Open Future Orders        Priority Expected Expires Ordered    H Pylori antigen, stool  "Routine  10/20/2018 9/20/2018            Who to contact     If you have questions or need follow up information about today's clinic visit or your schedule please contact [facility] directly at [phone].  Normal or non-critical lab and imaging results will be communicated to you by Collectivehart, letter or phone within 4 business days after the clinic has received the results. If you do not hear from us within 7 days, please contact the clinic through BeamExpresst or phone. If you have a critical or abnormal lab result, we will notify you by phone as soon as possible.  Submit refill requests through COGEON or call your pharmacy and they will forward the refill request to us. Please allow 3 business days for your refill to be completed.          Additional Information About Your Visit        Collectivehariyzico Information     COGEON gives you secure access to your electronic health record. If you see a primary care provider, you can also send messages to your care team and make appointments. If you have questions, please call your primary care clinic.  If you do not have a primary care provider, please call [phone] and they will assist you.        Care EveryWhere ID     This is your Care EveryWhere ID. This could be used by other organizations to access your [facility] medical records  [id number]        Your Vitals Were     Pulse Temperature Height Last Period Pulse Oximetry BMI (Body Mass Index)    81 99.9  F (37.7  C) (Oral) 5' 10\" (1.778 m) 09/16/2018 (Approximate) 98% 22.99 kg/m2       Blood Pressure from Last 3 Encounters:   09/20/18 116/83   08/08/18 151/88   06/01/18 121/81    Weight from Last 3 Encounters:   09/20/18 160 lb 3.2 oz (72.7 kg)   06/01/18 158 lb (71.7 kg)   05/07/18 158 lb (71.7 kg)                 Today's Medication Changes          These changes are accurate as of 9/20/18  3:27 PM.  If you have any questions, ask your nurse or doctor.               Start taking these medicines.        " Dose/Directions    famotidine 20 MG tablet   Commonly known as:  PEPCID   Used for:  Other acute gastritis without hemorrhage   Started by:  Kendall Garza MD        Dose:  20 mg   Take 1 tablet (20 mg) by mouth 2 times daily Start with once daily and increase to TWICE DAILY if pain persists.   Quantity:  30 tablet   Refills:  1            Where to get your medicines      These medications were sent to [facility]/pharmacy #[id number] - [place], [place] - [address] [facility]  [address] [facility] [address]     Phone:  [phone]     famotidine 20 MG tablet                Primary Care Provider Office Phone # Fax #    Kendall Garza [phone] [address] [address]        Equal Access to Services     GAYATHRI BACON : Hadii aad ku hadasho Soketurahali, waaxda luqadaha, qaybta kaalmada adeegyada, erickson morse . So [facility] [phone].    ATENCIÓN: Si habla español, tiene a george disposición servicios gratuitos de asistencia lingüística. Llame al [phone].    We comply with applicable federal civil rights laws and [place] laws. We do not discriminate on the basis of race, color, national origin, age, disability, sex, sexual orientation, or gender identity.            Thank you!     Thank you for choosing [facility]  for your care. Our goal is always to provide you with excellent care. Hearing back from our patients is one way we can continue to improve our services. Please take a few minutes to complete the written survey that you may receive in the mail after your visit with us. Thank you!             Your Updated Medication List - Protect others around you: Learn how to safely use, store and throw away your medicines at www.disposemymeds.org.          This list is accurate as of 9/20/18  3:27 PM.  Always use your most recent med list.                   Brand Name Dispense Instructions for use Diagnosis     acetaminophen-codeine 300-30 MG per tablet    TYLENOL #3    10 tablet    Take 1 tablet by mouth every 14 days Use 1 tablet every 2 weeks for migraine.  This is a 4 month supply    Migraine without aura and without status migrainosus, not intractable       diclofenac 1.3 % Patch    FLECTOR    60 patch    Place 1 patch onto the skin 2 times daily    Radial styloid tenosynovitis       famotidine 20 MG tablet    PEPCID    30 tablet    Take 1 tablet (20 mg) by mouth 2 times daily Start with once daily and increase to TWICE DAILY if pain persists.    Other acute gastritis without hemorrhage       FLUoxetine 20 MG capsule    PROzac    90 capsule    TAKE 1 CAPSULE (10 MG) BY MOUTH DAILY        fluticasone 50 MCG/ACT spray    FLONASE    1 g    Spray 1-2 sprays into both nostrils nightly as needed for rhinitis or allergies    Post-nasal drip       IBUPROFEN PO           order for DME     1 Device    Equipment being ordered: TENS    Radial styloid tenosynovitis       traMADol 50 MG tablet    ULTRAM    60 tablet    Take 1 tablet (50 mg) by mouth 2 times daily as needed for moderate pain    Right wrist pain

## 2018-09-20 NOTE — PATIENT INSTRUCTIONS
Gastritis or Ulcer, No Antibiotic Treatment    Gastritis is irritation and inflammation of the stomach lining. This means the lining is red and swollen. An ulcer is an open sore in the lining of the stomach. It may also occur in the first part of the small intestine (duodenum). The causes and symptoms of gastritis and ulcers are very similar.  Causes and risk factors for both problems can include:    Long-term use of nonsteroidal anti-inflammatory drugs (NSAIDs), such as aspirin and ibuprofen    H. pylori bacteria infection    Tobacco use    Alcohol use  Symptoms for both problems can include:    Dull or burning pain in the upper part of the belly    Loss of appetite    Heartburn or upset stomach    Frequent burping    Bloated feeling    Nausea with or without vomiting  You likely had an evaluation to help determine the exact cause and extent of your problem. This may have included a health history, exam, and certain tests.  Results showed that your problem is not due to H. pylori infection. For this reason, no antibiotics are needed as part of your treatment.  Whether your problem is found to be gastritis or an ulcer, you will still need to take other medicines, however. You will also need to follow instructions to help reduce stomach irritation so your stomach can heal.   Home care    Take any medicines you re prescribed exactly as directed. Common medicines used to treat gastritis include:  ? Antacids. These help neutralize the normal acids in your stomach.  ? Proton pump inhibitors. These block your stomach from making any acid.  ? H2 blockers.These reduce the amount of acid your stomach makes.  ? Bismuth subsalicylate. This helps protect the lining of your stomach from acid.    Don't take any NSAIDs during your treatment. If you take NSAID to help treat other health problems, tell your healthcare provider. He or she may need to adjust your medicine plan or change the dosage.    Don t use tobacco. Also don t  drink alcohol. These products can increase the amount of acid your stomach makes. This can delay healing. It can also worsen symptoms.  Follow-up care  Follow up with your healthcare provider, or as advised. In some cases, further testing may be needed.  When to seek medical advice  Call your healthcare provider right away if any of these occur:    Fever of 100.4 F (38 C) or higher, or as directed by your healthcare provider    Stomach pain that worsens or moves to the lower right part of abdomen    Extreme fatigue    Weakness or dizziness    Continued weight loss    Frequent vomiting, blood in your vomit, or coffee ground-like substance in your vomit    Black, tarry, or bloody stools  Call 911  Call 911 if any of these occur:    Chest pain appears or worsens, or spreads to the back, neck, shoulder, or arm    Unusually fast heart rate    Trouble breathing or swallowing    Confusion    Extreme drowsiness or trouble waking up    Fainting    Large amounts of blood present in vomit or stool  Date Last Reviewed: 6/16/2015 2000-2017 [employer]. [address] [place] [address]. All rights reserved. This information is not intended as a substitute for professional medical care. Always follow your healthcare professional's instructions.

## 2018-11-05 ENCOUNTER — TELEPHONE (OUTPATIENT)
Dept: ANESTHESIOLOGY | Facility: CLINIC | Age: 43
End: 2018-11-05

## 2018-11-05 NOTE — TELEPHONE ENCOUNTER
Health Call Center    Phone Message    May a detailed message be left on voicemail: yes    Reason for Call: Medication Refill Request    Has the patient contacted the pharmacy for the refill? Yes   Name of medication being requested: Tramadol  Provider who prescribed the medication: Dr. Kebede  Pharmacy: [facility]. Or Pt could . Please call to confirm.  Date medication is needed: 11/12/18         Action Taken: Message routed to:  [facility] ([facility]

## 2018-11-06 NOTE — TELEPHONE ENCOUNTER
I called and spoke with the patient. Patient wanted to ask if she is okay seeing if her Primary Care provider can take over prescribing her Tramadol since Dr. Kebede is out on medical leave. I informed the patient would be okay to do that. Patient stated verbal understanding and would go to her PCP for refills.    Natasha Broderick, KEVON

## 2018-11-08 ENCOUNTER — OFFICE VISIT (OUTPATIENT)
Dept: FAMILY MEDICINE | Facility: CLINIC | Age: 43
End: 2018-11-08
Payer: COMMERCIAL

## 2018-11-08 VITALS
HEIGHT: 70 IN | HEART RATE: 68 BPM | RESPIRATION RATE: 14 BRPM | TEMPERATURE: 99 F | WEIGHT: 160 LBS | SYSTOLIC BLOOD PRESSURE: 129 MMHG | DIASTOLIC BLOOD PRESSURE: 84 MMHG | OXYGEN SATURATION: 95 % | BODY MASS INDEX: 22.9 KG/M2

## 2018-11-08 DIAGNOSIS — K29.00 ACUTE SUPERFICIAL GASTRITIS WITHOUT HEMORRHAGE: ICD-10-CM

## 2018-11-08 DIAGNOSIS — J01.00 ACUTE NON-RECURRENT MAXILLARY SINUSITIS: Primary | ICD-10-CM

## 2018-11-08 DIAGNOSIS — M25.531 RIGHT WRIST PAIN: ICD-10-CM

## 2018-11-08 PROCEDURE — 99214 OFFICE O/P EST MOD 30 MIN: CPT | Performed by: FAMILY MEDICINE

## 2018-11-08 PROCEDURE — 87338 HPYLORI STOOL AG IA: CPT | Performed by: FAMILY MEDICINE

## 2018-11-08 RX ORDER — TRAMADOL HYDROCHLORIDE 50 MG/1
50 TABLET ORAL 2 TIMES DAILY PRN
Qty: 60 TABLET | Refills: 2 | Status: SHIPPED | OUTPATIENT
Start: 2018-11-16 | End: 2019-02-08

## 2018-11-08 NOTE — MR AVS SNAPSHOT
"              After Visit Summary   11/8/2018    Renetta Lauren    MRN: [id number]           Patient Information     Date Of Birth          1975        Visit Information        Provider Department      11/8/2018 11:40 AM Kendall Garza MD [facility]        Today's Diagnoses     Acute non-recurrent maxillary sinusitis    -  1    Right wrist pain           Follow-ups after your visit        Who to contact     If you have questions or need follow up information about today's clinic visit or your schedule please contact [facility] directly at [phone].  Normal or non-critical lab and imaging results will be communicated to you by Atrecahart, letter or phone within 4 business days after the clinic has received the results. If you do not hear from us within 7 days, please contact the clinic through Atrecahart or phone. If you have a critical or abnormal lab result, we will notify you by phone as soon as possible.  Submit refill requests through Clarity or call your pharmacy and they will forward the refill request to us. Please allow 3 business days for your refill to be completed.          Additional Information About Your Visit        MyChart Information     Clarity gives you secure access to your electronic health record. If you see a primary care provider, you can also send messages to your care team and make appointments. If you have questions, please call your primary care clinic.  If you do not have a primary care provider, please call [phone] and they will assist you.        Care EveryWhere ID     This is your Care EveryWhere ID. This could be used by other organizations to access your [facility] medical records  [id number]        Your Vitals Were     Pulse Temperature Respirations Height Pulse Oximetry Breastfeeding?    68 99  F (37.2  C) (Oral) 14 5' 10\" (1.778 m) 95% No    BMI (Body Mass Index)                   22.96 kg/m2            Blood Pressure from Last 3 Encounters: "   11/08/18 129/84   09/20/18 116/83   08/08/18 151/88    Weight from Last 3 Encounters:   11/08/18 160 lb (72.6 kg)   09/20/18 160 lb 3.2 oz (72.7 kg)   06/01/18 158 lb (71.7 kg)              Today, you had the following     No orders found for display         Today's Medication Changes          These changes are accurate as of 11/8/18 11:59 PM.  If you have any questions, ask your nurse or doctor.               Start taking these medicines.        Dose/Directions    amoxicillin-clavulanate 875-125 MG per tablet   Commonly known as:  AUGMENTIN   Used for:  Acute non-recurrent maxillary sinusitis   Started by:  Kendall Garza MD        Dose:  1 tablet   Take 1 tablet by mouth 2 times daily   Quantity:  20 tablet   Refills:  0         These medicines have changed or have updated prescriptions.        Dose/Directions    FLUoxetine 20 MG capsule   Commonly known as:  PROzac   This may have changed:    - how much to take  - how to take this  - when to take this  - additional instructions        TAKE 1 CAPSULE (10 MG) BY MOUTH DAILY   Quantity:  90 capsule   Refills:  1       traMADol 50 MG tablet   Commonly known as:  ULTRAM   This may have changed:  These instructions start on 11/16/2018. If you are unsure what to do until then, ask your doctor or other care provider.   Used for:  Right wrist pain   Changed by:  Kendall Garza MD        Dose:  50 mg   Start taking on:  11/16/2018   Take 1 tablet (50 mg) by mouth 2 times daily as needed for moderate pain   Quantity:  60 tablet   Refills:  2            Where to get your medicines      These medications were sent to [facility]/pharmacy #[id number] - [place], [place] - [address] [place] [address]     Phone:  [phone]     amoxicillin-clavulanate 875-125 MG per tablet         Some of these will need a paper prescription and others can be bought over the counter.  Ask your nurse if you have questions.     Bring a paper prescription for  each of these medications     traMADol 50 MG tablet               Information about OPIOIDS     PRESCRIPTION OPIOIDS: WHAT YOU NEED TO KNOW   We gave you an opioid (narcotic) pain medicine. It is important to manage your pain, but opioids are not always the best choice. You should first try all the other options your care team gave you. Take this medicine for as short a time (and as few doses) as possible.    Some activities can increase your pain, such as bandage changes or therapy sessions. It may help to take your pain medicine 30 to 60 minutes before these activities. Reduce your stress by getting enough sleep, working on hobbies you enjoy and practicing relaxation or meditation. Talk to your care team about ways to manage your pain beyond prescription opioids.    These medicines have risks:    DO NOT drive when on new or higher doses of pain medicine. These medicines can affect your alertness and reaction times, and you could be arrested for driving under the influence (DUI). If you need to use opioids long-term, talk to your care team about driving.    DO NOT operate heavy machinery    DO NOT do any other dangerous activities while taking these medicines.    DO NOT drink any alcohol while taking these medicines.     If the opioid prescribed includes acetaminophen, DO NOT take with any other medicines that contain acetaminophen. Read all labels carefully. Look for the word  acetaminophen  or  Tylenol.  Ask your pharmacist if you have questions or are unsure.    You can get addicted to pain medicines, especially if you have a history of addiction (chemical, alcohol or substance dependence). Talk to your care team about ways to reduce this risk.    All opioids tend to cause constipation. Drink plenty of water and eat foods that have a lot of fiber, such as fruits, vegetables, prune juice, apple juice and high-fiber cereal. Take a laxative (Miralax, milk of magnesia, Colace, Senna) if you don t move your bowels at  least every other day. Other side effects include upset stomach, sleepiness, dizziness, throwing up, tolerance (needing more of the medicine to have the same effect), physical dependence and slowed breathing.    Store your pills in a secure place, locked if possible. We will not replace any lost or stolen medicine. If you don t finish your medicine, please throw away (dispose) as directed by your pharmacist. The [place] [employer]ution [employer] has more information about safe disposal: [email or web address]         Primary Care Provider Office Phone # Fax #    Kendall Jack Garza [phone] [address] [facility] [address] [address]        Equal Access to Services     GAYATHRI BACON : Hadii aad ku hadasho Soketurahali, waaxda luqadaha, qaybta kaalmada adeegyada, erickson morse . So [facility] [phone].    ATENCIÓN: Si habla español, tiene a george disposición servicios gratuitos de asistencia lingüística. Llame al [phone].    We comply with applicable federal civil rights laws and [place] laws. We do not discriminate on the basis of race, color, national origin, age, disability, sex, sexual orientation, or gender identity.            Thank you!     Thank you for choosing [facility]  for your care. Our goal is always to provide you with excellent care. Hearing back from our patients is one way we can continue to improve our services. Please take a few minutes to complete the written survey that you may receive in the mail after your visit with us. Thank you!             Your Updated Medication List - Protect others around you: Learn how to safely use, store and throw away your medicines at www.disposemymeds.org.          This list is accurate as of 11/8/18 11:59 PM.  Always use your most recent med list.                   Brand Name Dispense Instructions for use Diagnosis    amoxicillin-clavulanate  875-125 MG per tablet    AUGMENTIN    20 tablet    Take 1 tablet by mouth 2 times daily    Acute non-recurrent maxillary sinusitis       famotidine 20 MG tablet    PEPCID    30 tablet    Take 1 tablet (20 mg) by mouth 2 times daily Start with once daily and increase to TWICE DAILY if pain persists.    Acute superficial gastritis without hemorrhage       FLUoxetine 20 MG capsule    PROzac    90 capsule    TAKE 1 CAPSULE (10 MG) BY MOUTH DAILY        IBUPROFEN PO           order for DME     1 Device    Equipment being ordered: TENS    Radial styloid tenosynovitis       traMADol 50 MG tablet   Start taking on:  11/16/2018    ULTRAM    60 tablet    Take 1 tablet (50 mg) by mouth 2 times daily as needed for moderate pain    Right wrist pain

## 2018-11-08 NOTE — NURSING NOTE
"Chief Complaint   Patient presents with     Cold Symptoms       Initial /84  Pulse 68  Temp 99  F (37.2  C) (Oral)  Resp 14  Ht 5' 10\" (1.778 m)  Wt 160 lb (72.6 kg)  SpO2 95%  Breastfeeding? No  BMI 22.96 kg/m2 Estimated body mass index is 22.96 kg/(m^2) as calculated from the following:    Height as of this encounter: 5' 10\" (1.778 m).    Weight as of this encounter: 160 lb (72.6 kg).  BP completed using cuff size: regular    Health Maintenance that is potentially due pending provider review:  There are no preventive care reminders to display for this patient.      n/a  "

## 2018-11-08 NOTE — PROGRESS NOTES
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     Patient will be traveling to [facility] and is worried about getting a worsening infection. Patient had sinus infections in the past and it always starts like this and progresses to a worsening infections.     Problem list and histories reviewed & adjusted, as indicated.  Additional history: as documented    Patient Active Problem List   Diagnosis     CARDIOVASCULAR SCREENING; LDL GOAL LESS THAN 160     PPROM Elevated Risk Factor     Need for Tdap vaccination      premature rupture of membranes (PPROM) with onset of labor after 24 hours of rupture in second trimester, antepartum     Right wrist pain     Radial styloid tenosynovitis of right hand     Post-nasal drip     Screening for cervical cancer     Migraine without aura and without status migrainosus, not intractable     Human papilloma virus (HPV) infection     Paresthesia of right arm     Past Surgical History:   Procedure Laterality Date     C APPENDECTOMY       C STOMACH SURGERY PROCEDURE UNLISTED      Appendectomy     OPERATIVE HYSTEROSCOPY WITH MORCELLATOR N/A 2015    Procedure: OPERATIVE HYSTEROSCOPY WITH MORCELLATOR;  Surgeon: Reyna Marshall MD;  Location:  OR       Social History  "  Substance Use Topics     Smoking status: Never Smoker     Smokeless tobacco: Never Used     Alcohol use 0.0 oz/week      Comment: 1-2 glasses of wine per week     Family History   Problem Relation Age of Onset     Family History Negative Other      Hypertension Mother      Other Cancer Mother      Cancer Mother      Family History Negative Father      Coronary Artery Disease Maternal Grandfather            Reviewed and updated as needed this visit by clinical staff    ROS:  Constitutional, HEENT, cardiovascular, pulmonary, gi and gu systems are negative, except as otherwise noted.    OBJECTIVE:     /84  Pulse 68  Temp 99  F (37.2  C) (Oral)  Resp 14  Ht 5' 10\" (1.778 m)  Wt 160 lb (72.6 kg)  SpO2 95%  Breastfeeding? No  BMI 22.96 kg/m2  Body mass index is 22.96 kg/(m^2).  GENERAL: healthy, alert and no distress  EYES: Eyes grossly normal to inspection, PERRL and conjunctivae and sclerae normal  HENT: ear canals and TM's normal, nose and mouth without ulcers or lesions  RESP: lungs clear to auscultation - no rales, rhonchi or wheezes  CV: regular rate and rhythm, normal S1 S2, no S3 or S4, no murmur, click or rub, no peripheral edema and peripheral pulses strong  MS: no gross musculoskeletal defects noted, no edema    Diagnostic Test Results:  No results found for this or any previous visit (from the past 24 hour(s)).    ASSESSMENT/PLAN:   1. Acute non-recurrent maxillary sinusitis  Assessment: early signs of sinusitis. Advised to start medications if symptoms gets worse. Patient is traveling and does not desire to see a doctor in a foreign land.   Plan:  - amoxicillin-clavulanate (AUGMENTIN) 875-125 MG per tablet; Take 1 tablet by mouth 2 times daily  Dispense: 20 tablet; Refill: 0    2. Right wrist pain  Patient's pain specialist is on medical leave. Desires medication refills.   - traMADol (ULTRAM) 50 MG tablet; Take 1 tablet (50 mg) by mouth 2 times daily as needed for moderate pain  Dispense: 60 " tablet; Refill: 2    Kendall Garza MD  [facility]

## 2018-11-09 LAB
H PYLORI AG STL QL IA: NORMAL
SPECIMEN SOURCE: NORMAL

## 2018-11-12 ENCOUNTER — MYC MEDICAL ADVICE (OUTPATIENT)
Dept: FAMILY MEDICINE | Facility: CLINIC | Age: 43
End: 2018-11-12

## 2018-11-12 NOTE — TELEPHONE ENCOUNTER
FS  Please see [employer] message below.  Last refill 10/16/18 (Dr. Kebede).    Ok to refill early? Tomorrow?  Triage can call pharmacy and let them know.  Thanks, Devika Canseco RN

## 2018-12-03 ENCOUNTER — MYC MEDICAL ADVICE (OUTPATIENT)
Dept: FAMILY MEDICINE | Facility: CLINIC | Age: 43
End: 2018-12-03

## 2019-01-16 ENCOUNTER — OFFICE VISIT (OUTPATIENT)
Dept: FAMILY MEDICINE | Facility: CLINIC | Age: 44
End: 2019-01-16
Payer: COMMERCIAL

## 2019-01-16 VITALS
RESPIRATION RATE: 16 BRPM | HEART RATE: 74 BPM | OXYGEN SATURATION: 96 % | DIASTOLIC BLOOD PRESSURE: 67 MMHG | TEMPERATURE: 97.1 F | BODY MASS INDEX: 22.9 KG/M2 | HEIGHT: 70 IN | WEIGHT: 160 LBS | SYSTOLIC BLOOD PRESSURE: 108 MMHG

## 2019-01-16 DIAGNOSIS — J01.90 ACUTE SINUSITIS WITH SYMPTOMS > 10 DAYS: Primary | ICD-10-CM

## 2019-01-16 DIAGNOSIS — L70.9 ACNE, UNSPECIFIED ACNE TYPE: ICD-10-CM

## 2019-01-16 DIAGNOSIS — J01.80 OTHER ACUTE SINUSITIS, RECURRENCE NOT SPECIFIED: ICD-10-CM

## 2019-01-16 PROCEDURE — 99213 OFFICE O/P EST LOW 20 MIN: CPT | Performed by: FAMILY MEDICINE

## 2019-01-16 RX ORDER — FLUTICASONE PROPIONATE 50 MCG
1 SPRAY, SUSPENSION (ML) NASAL DAILY
Qty: 9.9 ML | Refills: 3 | Status: SHIPPED | OUTPATIENT
Start: 2019-01-16 | End: 2020-08-31

## 2019-01-16 ASSESSMENT — MIFFLIN-ST. JEOR: SCORE: 1456.01

## 2019-01-16 NOTE — PROGRESS NOTES
"  SUBJECTIVE:   Renetta Lauren is a 43 year old female who presents to clinic today for the following health issues:      Follow up sinus infection in Nov.- given ABX-symptoms again in early Dec.    SUBJECTIVE: Renetta Lauren  is here today because of:Sinus Pain, Sore Throat, productive Cough white or yelolow sputum and \"Flu\"  The patient has had symptoms of cough, sore throat, nasal congestion/runny nose, headache, facial pressure, sinus pain and chest congestion.   Onset of symptoms was 2 months ago. Course of illness is waxing and waning.  Patient admits to exposure to illness at home or work/school.-son in  has been sick  Patient denies fever, earache, nausea, vomiting, diarrhea, sneezing, ear discharge, swollen glands, wheezing, myalgias, itching in eyes and mattering conjuntivitis on both sides  Treatment measures tried include Robitussin DM.  Patient is not a smoker    Current Outpatient Medications   Medication Sig Dispense Refill     FLUoxetine (PROZAC) 20 MG capsule Take 1 capsule (20 mg) by mouth daily 90 capsule 1     fluticasone (FLONASE) 50 MCG/ACT nasal spray Spray 1 spray into both nostrils daily 9.9 mL 3     IBUPROFEN PO        tazarotene (TAZORAC) 0.05 % external cream Externally apply topically At Bedtime Apply to chest and upper back. Stop using if the skin becomes too dry. 60 g 3     traMADol (ULTRAM) 50 MG tablet Take 1 tablet (50 mg) by mouth 2 times daily as needed for moderate pain 60 tablet 2      Allergies   Allergen Reactions     Nkda [No Known Drug Allergies]         OBJECTIVE:   Vital signs:/67   Pulse 74   Temp 97.1  F (36.2  C) (Oral)   Resp 16   Ht 1.778 m (5' 10\")   Wt 72.6 kg (160 lb)   SpO2 96%   BMI 22.96 kg/m     General: healthy, alert and no distress  Skin is unremarkable.  HEENT: ENT exam normal, no neck nodes or sinus tenderness.  Lungs chest clear to IPPA, no tachypnea, retractions or cyanosis and S1, S2 normal, no murmur, no gallop, rate " regular  Rapid Strep Test is no performed      ICD-10-CM    1. Acute sinusitis with symptoms > 10 days J01.90    2. Other acute sinusitis, recurrence not specified J01.80 amoxicillin-clavulanate (AUGMENTIN) 875-125 MG tablet     fluticasone (FLONASE) 50 MCG/ACT nasal spray   3. Acne, unspecified acne type L70.9 tazarotene (TAZORAC) 0.05 % external cream         PLAN:  Use acetaminophen, ibuprofen, decongestant nasal spray up to 3 days, saline nasal spray as needed, increase fluids and rest.   Follow up with any questions or problems    Kendall Garza MD  [facility]  PAGER: [phone] or (W): [phone]

## 2019-01-16 NOTE — NURSING NOTE
"Chief Complaint   Patient presents with     RECHECK     follow up on-going sinus infection        Initial /67   Pulse 74   Temp 97.1  F (36.2  C) (Oral)   Resp 16   Ht 1.778 m (5' 10\")   Wt 72.6 kg (160 lb)   SpO2 96%   BMI 22.96 kg/m   Estimated body mass index is 22.96 kg/m  as calculated from the following:    Height as of this encounter: 1.778 m (5' 10\").    Weight as of this encounter: 72.6 kg (160 lb).  BP completed using cuff size: regular    Health Maintenance that is potentially due pending provider review:  Health Maintenance Due   Topic Date Due     PHQ-9 Q1YR  01/05/2019         Per provider  "

## 2019-01-22 ENCOUNTER — TELEPHONE (OUTPATIENT)
Dept: FAMILY MEDICINE | Facility: CLINIC | Age: 44
End: 2019-01-22

## 2019-01-22 NOTE — TELEPHONE ENCOUNTER
Prior Authorization Retail Medication Request    Medication/Dose: tazarotene (TAZORAC) 0.05 % external cream  ICD code (if different than what is on RX):    Previously Tried and Failed:  Salicylic Acid, Benzoyl Peroxide, Tea tree oil, witch hazel, Retin A  Rationale:      Insurance Name:  [employer] [phone]  Insurance ID:  [id number]      Pharmacy Information (if different than what is on RX)  Name:  [address]  Phone:  [phone]

## 2019-01-28 NOTE — TELEPHONE ENCOUNTER
PA Initiation    Medication: tazarotene (TAZORAC) 0.05 % external cream  Insurance Company: [employer] - Phone [phone] Fax [phone]  Pharmacy Filling the Rx: [id number] - [address]  Filling Pharmacy Phone:  [phone]  Filling Pharmacy Fax:    Start Date: 1/28/2019

## 2019-01-31 ENCOUNTER — TELEPHONE (OUTPATIENT)
Dept: FAMILY MEDICINE | Facility: CLINIC | Age: 44
End: 2019-01-31

## 2019-01-31 DIAGNOSIS — J01.90 ACUTE SINUSITIS WITH SYMPTOMS > 10 DAYS: Primary | ICD-10-CM

## 2019-01-31 RX ORDER — AZITHROMYCIN 250 MG/1
TABLET, FILM COATED ORAL
Qty: 6 TABLET | Refills: 0 | Status: SHIPPED | OUTPATIENT
Start: 2019-01-31 | End: 2019-06-03

## 2019-01-31 NOTE — TELEPHONE ENCOUNTER
"JF  Please advise in FS's absence.  Pt was seen on 1/16/19 for sinusitis,  recheck from sinus infection in Nov - given ABX.  Given Augmentin again    Her symptoms did improve right away after starting abx and felt well 2 days after completion of ABX but now on her left side symptoms are returning. \"Feels far back on my left side\".  A lot of pressure, facial pain, headache and significant post nasal drip with throat pain and choking sensation. Feels like she is going to throw up because of the amount of mucus, which she did previous to treatment. Also coughing a lot at night because of the drip, taking Robitussin.     Recommended that she use humidifier, Neti pot and/or saline nasal spray to break up secretions.    Do you think another round of abx, return OV or ENT referral?    Thank you,  NJ Bojorquez      "

## 2019-01-31 NOTE — TELEPHONE ENCOUNTER
Reason for Call:  Other returning symptoms     Detailed comments: patient was treated for sinus infection with augmentin for 10 days ending 1/26. .  Patient said symptoms didn't completely go away, she has post nasal drip, sinus pain and pressure on one side.  Feels like she is getting a full on sinus infection again, and is not sure how to treat it.  Please advise.     Phone Number Patient can be reached at: Home number on file [phone] (home)    Best Time: any    Can we leave a detailed message on this number? YES    Call taken on 1/31/2019 at 9:43 AM by Jaquelin Montana

## 2019-02-04 DIAGNOSIS — G89.4 CHRONIC PAIN SYNDROME: Primary | ICD-10-CM

## 2019-02-04 DIAGNOSIS — M25.531 RIGHT WRIST PAIN: ICD-10-CM

## 2019-02-04 PROBLEM — R20.2 PARESTHESIA OF RIGHT ARM: Status: RESOLVED | Noted: 2018-06-06 | Resolved: 2019-02-04

## 2019-02-04 NOTE — PROGRESS NOTES
Discharge Summary - Hand Therapy    2/4/2019    Patient did not return to therapy.  We will assume that patient's goals were met.  This episode of care will be resolved.  Plan: Discharge from hand therapy.    Lorene Claros OTR/L

## 2019-02-05 RX ORDER — TRAMADOL HYDROCHLORIDE 50 MG/1
50 TABLET ORAL 2 TIMES DAILY PRN
Qty: 60 TABLET | Status: CANCELLED | OUTPATIENT
Start: 2019-02-07

## 2019-02-05 NOTE — TELEPHONE ENCOUNTER
FS    Controlled Substance Refill Request for Tramadol    Last refill: 1/7/2019 - #60    Last clinic visit: 1/16/19 for sinusitis    Clinic visit frequency required: Not documented    Next appt: No future OV scheduled    Controlled substance agreement on file: No.    Documentation in problem list reviewed:  No    Processing:  Fax Rx to [facility] - [address]    [place]  reviewed- no concerns  [place]  [email or web address]    Thanks,   Devika Canseco RN

## 2019-02-06 NOTE — TELEPHONE ENCOUNTER
Patient called said that the [id number] - [place], [place] - [address]  Does not have there refill for Tramadol    Please call pt when this is ready    [phone] home

## 2019-02-08 RX ORDER — TRAMADOL HYDROCHLORIDE 50 MG/1
50 TABLET ORAL 2 TIMES DAILY PRN
Qty: 60 TABLET | Refills: 0 | Status: SHIPPED | OUTPATIENT
Start: 2019-02-08 | End: 2019-03-08

## 2019-02-08 NOTE — TELEPHONE ENCOUNTER
The patient was seen and evaluated at the pain management clinic at the [facility] (8/8/2018). The plan was to decrease tramadol intake from 5-7 times per week to 2-3 times per week.   The patient was given a prescription on 01/07/2019 and it was for 60 tablets of tramadol and it should've lasted until 03/07/2019.   Because the patient is taking daily opioids for pain, chronic pain syndrome should be addressed in a face to face office visit.       Prescription declined.   Advise to schedule a face to face office visit.       Kayla

## 2019-02-08 NOTE — TELEPHONE ENCOUNTER
LS  Please advise in FS's absence  Reviewed note from Kayla, see below  Pt says that the goal from the pain clinic, decreasing tramadol, was what she wanted along with other therapies, but unfortunately she still has pain at night that keeps her up.    She takes 1-1 1/2 tabs before bed and if she wakes and can't fall back to sleep she takes another. Only takes at night.     Is trying to avoid NSAIDs as well d/t abdominal pain that she was experiencing back in the Fall. 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    Her Pain Specialists is on an extended leave.     Wondering if she can have a refill and come in next week?  Please advise,  Maryellen Cronin RN

## 2019-03-08 ENCOUNTER — OFFICE VISIT (OUTPATIENT)
Dept: FAMILY MEDICINE | Facility: CLINIC | Age: 44
End: 2019-03-08
Payer: COMMERCIAL

## 2019-03-08 VITALS
SYSTOLIC BLOOD PRESSURE: 121 MMHG | WEIGHT: 160 LBS | DIASTOLIC BLOOD PRESSURE: 78 MMHG | OXYGEN SATURATION: 97 % | HEART RATE: 75 BPM | BODY MASS INDEX: 22.96 KG/M2 | TEMPERATURE: 98.2 F | RESPIRATION RATE: 12 BRPM

## 2019-03-08 DIAGNOSIS — M25.531 RIGHT WRIST PAIN: ICD-10-CM

## 2019-03-08 DIAGNOSIS — J32.0 CHRONIC MAXILLARY SINUSITIS: Primary | ICD-10-CM

## 2019-03-08 DIAGNOSIS — G89.4 CHRONIC PAIN SYNDROME: ICD-10-CM

## 2019-03-08 PROCEDURE — 99214 OFFICE O/P EST MOD 30 MIN: CPT | Performed by: FAMILY MEDICINE

## 2019-03-08 RX ORDER — TRAMADOL HYDROCHLORIDE 50 MG/1
50 TABLET ORAL 2 TIMES DAILY
Qty: 60 TABLET | Refills: 0 | Status: SHIPPED | OUTPATIENT
Start: 2019-05-08 | End: 2019-06-03

## 2019-03-08 RX ORDER — TRAMADOL HYDROCHLORIDE 50 MG/1
50 TABLET ORAL 2 TIMES DAILY PRN
Qty: 60 TABLET | Refills: 0 | Status: SHIPPED | OUTPATIENT
Start: 2019-03-08 | End: 2019-06-04

## 2019-03-08 RX ORDER — TRAMADOL HYDROCHLORIDE 50 MG/1
50 TABLET ORAL 2 TIMES DAILY
Qty: 60 TABLET | Refills: 0 | Status: SHIPPED | OUTPATIENT
Start: 2019-04-07 | End: 2019-06-03

## 2019-03-08 NOTE — NURSING NOTE
"Chief Complaint   Patient presents with     URI     /78 (BP Location: Left arm, Patient Position: Sitting, Cuff Size: Adult Regular)   Pulse 75   Temp 98.2  F (36.8  C) (Oral)   Resp 12   Wt 72.6 kg (160 lb)   SpO2 97%   Breastfeeding? No   BMI 22.96 kg/m   Estimated body mass index is 22.96 kg/m  as calculated from the following:    Height as of 1/16/19: 1.778 m (5' 10\").    Weight as of this encounter: 72.6 kg (160 lb).  bp completed using cuff size: regular       Health Maintenance addressed:  NONE    n/a    Adelso Melendez MA     "

## 2019-03-08 NOTE — PROGRESS NOTES
SUBJECTIVE:   Renetta Lauren is a 43 year old female who presents to clinic today for the following health issues:      Patient is back to follow up on the sinus infection. Patient state that she is not feeling better, she has constant post nasal drip and concern about left side of sinus pressure.     Also, the patient has chronic wrist pain. Pain is moderate at night. Patient is not able to sleep due to pain.  She attempted occupational therapy and massage without significant relief in her symptoms.  The patient was seen at the pain clinic previously.      Problem list and histories reviewed & adjusted, as indicated.  Additional history: as documented    Patient Active Problem List   Diagnosis     CARDIOVASCULAR SCREENING; LDL GOAL LESS THAN 160     PPROM Elevated Risk Factor     Need for Tdap vaccination      premature rupture of membranes (PPROM) with onset of labor after 24 hours of rupture in second trimester, antepartum     Post-nasal drip     Screening for cervical cancer     Migraine without aura and without status migrainosus, not intractable     Human papilloma virus (HPV) infection     Chronic pain syndrome     Past Surgical History:   Procedure Laterality Date     C APPENDECTOMY       C STOMACH SURGERY PROCEDURE UNLISTED      Appendectomy     OPERATIVE HYSTEROSCOPY WITH MORCELLATOR N/A 2015    Procedure: OPERATIVE HYSTEROSCOPY WITH MORCELLATOR;  Surgeon: Reyna Marshall MD;  Location:  OR       Social History     Tobacco Use     Smoking status: Never Smoker     Smokeless tobacco: Never Used   Substance Use Topics     Alcohol use: Yes     Alcohol/week: 0.0 oz     Comment: 1-2 glasses of wine per week     Family History   Problem Relation Age of Onset     Family History Negative Other      Hypertension Mother      Other Cancer Mother      Cancer Mother      Family History Negative Father      Coronary Artery Disease Maternal Grandfather            Reviewed and updated as  needed this visit by clinical staff  Tobacco       Reviewed and updated as needed this visit by Provider         ROS:  Constitutional, HEENT, cardiovascular, pulmonary, gi and gu systems are negative, except as otherwise noted.    OBJECTIVE:     /78 (BP Location: Left arm, Patient Position: Sitting, Cuff Size: Adult Regular)   Pulse 75   Temp 98.2  F (36.8  C) (Oral)   Resp 12   Wt 72.6 kg (160 lb)   SpO2 97%   Breastfeeding? No   BMI 22.96 kg/m    Body mass index is 22.96 kg/m .  GENERAL: healthy, alert and no distress  NECK: no adenopathy, no asymmetry, masses, or scars and thyroid normal to palpation  RESP: lungs clear to auscultation - no rales, rhonchi or wheezes  CV: regular rate and rhythm, normal S1 S2, no S3 or S4, no murmur, click or rub, no peripheral edema and peripheral pulses strong  MS: no gross musculoskeletal defects noted, no edema    Diagnostic Test Results:  Results for orders placed or performed in visit on 11/08/18   H Pylori antigen, stool   Result Value Ref Range    Specimen Description Feces     H Pylori Antigen       Negative for Helicobacter pylori antigen by enzyme immunoassay. A negative result   indicates the absence of H. pylori antigen or that the level of antigen is below the level   of detection.         ASSESSMENT/PLAN:       ICD-10-CM    1. Chronic maxillary sinusitis J32.0 OTOLARYNGOLOGY REFERRAL   2. Right wrist pain M25.531 traMADol (ULTRAM) 50 MG tablet     traMADol (ULTRAM) 50 MG tablet     traMADol (ULTRAM) 50 MG tablet   3. Chronic pain syndrome G89.4 traMADol (ULTRAM) 50 MG tablet     traMADol (ULTRAM) 50 MG tablet     traMADol (ULTRAM) 50 MG tablet     Persistent sinus symptoms. Patient was treated with antibiotics x 2 previously.  She was advised to proceed with an evaluation by ENT.  Concerns for septal abnormality causing her persistent pain and infection.      Kendall Garza MD  [facility]

## 2019-03-08 NOTE — LETTER
[facility]  03/08/19    Patient: Renetta Lauren  YOB: 1975  Medical Record Number: [id number]                                                                  Opioid / Opioid Plus Controlled Substance Agreement    I understand that my care provider has prescribed an opioid (narcotic) controlled substance to help manage my condition(s). I am taking this medicine to help me function or work. I know this is strong medicine, and that it can cause serious side effects. Opioid medicine can be sedating, addicting and may cause a dependency on the drug. They can affect my ability to drive or think, and cause depression. They need to be taken exactly as prescribed. Combining opioids with certain medicines or chemicals (such as cocaine, sedatives and tranquilizers, sleeping pills, meth) can be dangerous or even fatal. Also, if I stop opioids suddenly, I may have severe withdrawal symptoms. Last, I understand that opioids do not work for all types of pain nor for all patients. If not helpful, I may be asked to stop them.    The risks, benefits, and side effects of these medicine(s) were explained to me. I agree that:    1. I will take part in other treatments as advised by my care team. This may be psychiatry or counseling, physical therapy, behavioral therapy, group treatment or a referral to a pain clinic. I will reduce or stop my medicine when my care team tells me to do so.  2. I will take my medicines as prescribed. I will not change the dose or schedule unless my care team tells me to. There will be no refills if I  run out early.   I may be contactedwithout warning and asked to complete a urine drug test or pill count at any time.   3. I will keep all my appointments, and understand this is part of the monitoring of opioids. My care team may require an office visit for EVERY opioid/controlled substance refill. If I miss appointments or don t follow instructions, my care team may stop my  medicine.  4. I will not ask other providers to prescribe controlled substances, and I will not accept controlled substances from other people. If I need another prescribed controlled substance for a new reason, I will tell my care team within 1 business day.  5. I will use one pharmacy to fill all of my controlled substance prescriptions, and it is up to me to make sure that I do not run out of my medicines on weekends or holidays. If my care team is willing to refill my opioid prescription without a visit, I must request refills only during office hours, refills may take up to 3 days to process, and it may take up to 5 to 7 days for my medicine to be mailed and ready at my pharmacy. Prescriptions will not be mailed anywhere except my pharmacy.        [id number]  Rev 12/18         Registration to scan to EHR                             Page 1 of 2               Controlled Substance Agreement Opioid        [facility]  03/08/19  Patient: Renetta Lauren  YOB: 1975  Medical Record Number: [id number]                                                                  6. I am responsible for my prescriptions. If the medicine/prescription is lost or stolen, it will not be replaced. I also agree not to share controlled substance medicines with anyone.  7. I agree to not use ANY illegal or recreational drugs. This includes marijuana, cocaine, bath salts or other drugs. I agree not to use alcohol unless my care team says I may.          I agree to give urine samples whenever asked. If I don t give a urine sample, the care team may stop my medicine.    8. If I enroll in the [place] Medical Marijuana program, I will tell my care team. I will also sign an agreement to share my medical records with my care team.   9. I will bring in my list of medicines (or my medicine bottles) each time I come to the clinic.   10. I will tell my care team right away if I become pregnant or have a new medical problem treated  outside of my regular clinic.  11. I understand that this medicine can affect my thinking and judgment. It may be unsafe for me to drive, use machinery and do dangerous tasks. I will not do any of these things until I know how the medicine affects me. If my dose changes, I will wait to see how it affects me. I will contact my care team if I have concerns about medicine side effects.    I understand that if I do not follow any of the conditions above, my prescriptions or treatment may be stopped.      I agree that my provider, clinic care team, and pharmacy may work with any city, state or federal law enforcement agency that investigates the misuse, sale, or other diversion of my controlled medicine. I will allow my provider to discuss my care with or share a copy of this agreement with any other treating provider, pharmacy or emergency room where I receive care. I agree to give up (waive) any right of privacy or confidentiality with respect to these consents.     I have read this agreement and have asked questions about anything I did not understand.      ________________________________________________________________________  Patient signature - Date/Time -  Renetta Lauren                                      ________________________________________________________________________  Witness signature                                                            ________________________________________________________________________  Provider signature - Kendall Garza MD      [id number]  Rev 12/18         Registration to scan to EHR                         Page 2 of 2                   Controlled Substance Agreement Opioid           Page 1 of 2  Opioid Pain Medicines (also known as Narcotics)  What You Need to Know    What are opioids?   Opioids are pain medicines that must be prescribed by a doctor.  They are also known as narcotics.    Examples are:     morphine (MS Contin, Patti)    oxycodone  (Oxycontin)    oxycodone and acetaminophen (Percocet)    hydrocodone and acetaminophen (Vicodin, Norco)     fentanyl patch (Duragesic)     hydromorphone (Dilaudid)     methadone     What do opioids do well?   Opioids are best for short-term pain after a surgery or injury. They also work well for cancer pain. Unlike other pain medicines, they do not cause liver or kidney failure or ulcers. They may help some people with long-lasting (chronic) pain.     What do opioids NOT do well?   Opioids never get rid of pain entirely, and they do not work well for most patients with chronic pain. Opioids do not reduce swelling, one of the causes of pain. They also don t work well for nerve pain.                           For informational purposes only.  Not to replace the advice of your care provider.  Copyright [address] [facility]. All right reserved. [employer] 354219-Sdg 02/18.      Page 2 of 2    Risks and side effects   Talk to your doctor before you start or decide to keep taking one of these medicines. Side effects include:    Lowering your breathing rate enough to cause death    Overdose, including death, especially if taking higher than prescribed doses    Long-term opioid use    Worse depression symptoms; less pleasure in things you usually enjoy    Feeling tired or sluggish    Slower thoughts or cloudy thinking    Being more sensitive to pain over time; pain is harder to control    Trouble sleeping or restless sleep    Changes in hormone levels (for example, less testosterone)    Changes in sex drive or ability to have sex    Constipation    Unsafe driving    Itching and sweating    Feeling dizzy    Nausea, vomiting and dry mouth    What else should I know about opioids?  When someone takes opioids for too long or too often, they become dependent. This means that if you stop or reduce the medicine too quickly, you will have withdrawal symptoms.    Dependence is not the same as addiction. Addiction is when  people keep using a substance that harms their body, their mind or their relations with others. If you have a history of drug or alcohol abuse, taking opioids can cause a relapse.    Over time, opioids don t work as well. Most people will need higher and higher doses. The higher the dose, the more serious the side effects. We don t know the long-term effects of opioids.      Prescribed opioids aren't the best way to manage chronic pain    Other ways to manage pain include:      Ibuprofen or acetaminophen.  You should always try this first.      Treat health problems that may be causing pain.      acupuncture or massage, deep breathing, meditation, visual imagery, aromatherapy.      Use heat or ice at the pain site      Physical therapy and exercise      Stop smoking      See a counselor or therapist                                                  People who have used opioids for a long time may have a lower quality of life, worse depression, higher levels of pain and more visits to doctors.    Never share your opioids with others. Be sure to store opioids in a secure place, locked if possible.Young children can easily swallow them and overdose.     You can overdose on opioids.  Signs of overdose include decrease or loss of consciousness, slowed breathing, trouble waking and blue lips.  If someone is worried about overdose, they should call 911.    If you are at risk for overdose, you may get naloxone (Narcan, a medicine that reverses the effects of opioids.  If you overdose, a friend or family member can give you Narcan while waiting for the ambulance.  They need to know the signs of overdose and how to give Narcan.    While you're taking opioids:    Don't use alcohol or street drugs. Taking them together can cause death.    Don't take any of these medicines unless your doctor says its okay.  Taking these with opioids can cause death.    Benzodiazepines (such as lorazepam         or diazepam)    Muscle relaxers  (such as cyclobenzaprine)    sleeping pills    other opioids    Safe disposal of opioids  Find your area drug take-back program, your pharmacy mail-back program, buy a special disposal bag (such as Deterra) from your pharmacy or flush them down the toilet.  Use the guidelines at:  www.fda.gov/drugs/resourcesforyou

## 2019-04-23 ENCOUNTER — TELEPHONE (OUTPATIENT)
Dept: OTOLARYNGOLOGY | Facility: CLINIC | Age: 44
End: 2019-04-23

## 2019-04-23 NOTE — TELEPHONE ENCOUNTER
[facility] Call [facility]    Phone Message    May a detailed message be left on voicemail: yes    Reason for Call: Other: Pt called in regarding her appointment in 06/19. Pt stated that she is just suffering with her sinuses. 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 Writer did add appointment to the waitlist. Please folllow up if applicable.     Action Taken: Message routed to:  [facility] ([facility] ENT

## 2019-04-24 NOTE — TELEPHONE ENCOUNTER
I spoke with this pt; I stated to stay on the wait list for an earlier appt. Keep her 6/3/19 appt with Dr. Barros and continue to F/U with her PCP in the meantime for care of her sinusitis.  Pt was in agreement with this plan.    JOANNA Barrios LPN

## 2019-04-25 NOTE — TELEPHONE ENCOUNTER
FUTURE VISIT INFORMATION      FUTURE VISIT INFORMATION:    Date: 6/3/19    Time: 9:30 am    Location: [facility]  REFERRAL INFORMATION:    Referring provider:  Dr. Kendall Garza    Referring providers clinic:  [facility]    Reason for visit/diagnosis  Chronic Maxillary Sinusitis    RECORDS REQUESTED FROM:       Clinic name Comments Records Status Imaging Status   [facility] Office Visit-3/8/19, 1/16/19, 11/8/18-Dr. Garza  Office Visit-12/7/17 Epic

## 2019-05-01 ENCOUNTER — HOSPITAL ENCOUNTER (OUTPATIENT)
Dept: MAMMOGRAPHY | Facility: CLINIC | Age: 44
Discharge: HOME | End: 2019-05-01
Attending: FAMILY MEDICINE | Admitting: FAMILY MEDICINE
Payer: COMMERCIAL

## 2019-05-01 DIAGNOSIS — Z00.00 PREVENTATIVE HEALTH CARE: ICD-10-CM

## 2019-05-01 PROCEDURE — 77063 BREAST TOMOSYNTHESIS BI: CPT

## 2019-06-03 ENCOUNTER — PRE VISIT (OUTPATIENT)
Dept: OTOLARYNGOLOGY | Facility: CLINIC | Age: 44
End: 2019-06-03

## 2019-06-03 ENCOUNTER — ANCILLARY PROCEDURE (OUTPATIENT)
Dept: CT IMAGING | Facility: CLINIC | Age: 44
End: 2019-06-03
Attending: OTOLARYNGOLOGY
Payer: COMMERCIAL

## 2019-06-03 ENCOUNTER — OFFICE VISIT (OUTPATIENT)
Dept: OTOLARYNGOLOGY | Facility: CLINIC | Age: 44
End: 2019-06-03
Payer: COMMERCIAL

## 2019-06-03 VITALS
RESPIRATION RATE: 17 BRPM | DIASTOLIC BLOOD PRESSURE: 84 MMHG | TEMPERATURE: 98.5 F | SYSTOLIC BLOOD PRESSURE: 122 MMHG | BODY MASS INDEX: 23.7 KG/M2 | HEART RATE: 65 BPM | WEIGHT: 160 LBS | HEIGHT: 69 IN

## 2019-06-03 DIAGNOSIS — J32.9 CHRONIC SINUSITIS, UNSPECIFIED LOCATION: Primary | ICD-10-CM

## 2019-06-03 DIAGNOSIS — R93.89 ABNORMAL CHEST CT: ICD-10-CM

## 2019-06-03 DIAGNOSIS — J32.9 CHRONIC SINUSITIS, UNSPECIFIED LOCATION: ICD-10-CM

## 2019-06-03 RX ORDER — AZITHROMYCIN 250 MG/1
TABLET, FILM COATED ORAL
Qty: 6 TABLET | Refills: 2 | Status: SHIPPED | OUTPATIENT
Start: 2019-06-03 | End: 2019-07-15

## 2019-06-03 ASSESSMENT — PAIN SCALES - GENERAL: PAINLEVEL: NO PAIN (0)

## 2019-06-03 ASSESSMENT — MIFFLIN-ST. JEOR: SCORE: 1440.14

## 2019-06-03 NOTE — NURSING NOTE
"Chief Complaint   Patient presents with     Consult     chronic maxillary sinusitis      Chief Complaint   Patient presents with     Consult     chronic maxillary sinusitis      Blood pressure 122/84, pulse 65, temperature 98.5  F (36.9  C), resp. rate 17, height 1.753 m (5' 9\"), weight 72.6 kg (160 lb), not currently breastfeeding.    Aleks Childress LPN    "

## 2019-06-03 NOTE — LETTER
6/3/2019       RE: Renetta Lauren  [address]  [place] [address]     Dear Colleague,    Thank you for referring your patient, Renetta Lauren, to the [facility] EAR NOSE AND THROAT at [facility]. Please see a copy of my visit note below.    HISTORY OF PRESENT ILLNESS:  Ms. Lauren is here to see us today for the first time.  She is a 43-year-old woman who has had nasal congestion.  She also has had pain in the left side of her face consistent with sinus infections.  This has been going on since November.  She has had a number of courses of antibiotics.  Currently, her symptoms are improved, but she still has some pressure on the left side of her face.  She has had sinus infections in the past but not to the extent of this.      SOCIAL HISTORY:  The patient does not smoke cigarettes.  She works as a writer.      FAMILY HISTORY:  Noted and reviewed in the chart.      REVIEW OF SYSTEMS:  Pertinent positives and negatives as above.  Otherwise, complete review of systems noted and reviewed in the chart.      PHYSICAL EXAMINATION:  The patient is in no acute distress.  Normal mood, normal affect, normal ability to communicate.  Alert and appropriate.  Head is normocephalic.  Cranial nerve VII is House-Brackmann I out of VI bilaterally.  Breathing without any difficulty or stridor.  Eyes are anicteric.  Skin of the head and neck appears normal.  Examination of the nose is performed and shows normal nasal mucosa without evidence of polyps or purulence.  Oral cavity shows normal tongue, buccal mucosa and oropharynx.  Ears shows normal external auditory canals and tympanic membranes.      ASSESSMENT AND PLAN:  A 43-year-old with chronic sinus symptoms.  At this point, we will go ahead and do a CT scan.  I have given her a prescription for azithromycin as this she says is what helped her most recently.  We will do CT of her sinuses.  She also has a history of a chest CT which  reportedly had a finding that required follow up that never happened.  Therefore, we will order that as well.  We will plan on seeing her back after the CTs and review.         Again, thank you for allowing me to participate in the care of your patient.      Sincerely,    Saad Barros MD

## 2019-06-03 NOTE — PATIENT INSTRUCTIONS
You were seen in the ENT clinic today with Dr. Barros    The plan is to follow up with Dr. Barros after completing CT scans of your sinuses and your chest    Please schedule an appointment with following at your convenience:   -CT- Sinus   -CT- Chest       Please call our clinic for any questions, concerns, and/or worsening symptoms.      Clinic #[phone]       Option 1 for scheduling.    Thank you for allowing us to be apart of your care!    Estella VENTURA RNCC    If you need to reach me my direct line is: [phone]

## 2019-06-04 ENCOUNTER — MYC REFILL (OUTPATIENT)
Dept: FAMILY MEDICINE | Facility: CLINIC | Age: 44
End: 2019-06-04

## 2019-06-04 DIAGNOSIS — G89.4 CHRONIC PAIN SYNDROME: ICD-10-CM

## 2019-06-04 DIAGNOSIS — M25.531 RIGHT WRIST PAIN: ICD-10-CM

## 2019-06-05 NOTE — TELEPHONE ENCOUNTER
FS,     Controlled Substance Refill Request for Tramadol    Last refill: 5/8/2019 #60    Last clinic visit: 3/8/2019     Clinic visit frequency required: Q 6  months  Next appt: none    Controlled substance agreement on file: Yes:  Date 5/7/2018.    Documentation in problem list reviewed:  Yes    Processing:  Fax Rx to [facility] pharmacy    [place] reviewed:  reviewed- no concerns  [place]:  [email or web address]    Please authorize if appropriate.  Thanks,  Adrianne WAGONER RN

## 2019-06-10 RX ORDER — TRAMADOL HYDROCHLORIDE 50 MG/1
50 TABLET ORAL 2 TIMES DAILY PRN
Qty: 60 TABLET | Refills: 0 | Status: SHIPPED | OUTPATIENT
Start: 2019-06-10 | End: 2019-06-10

## 2019-06-10 RX ORDER — TRAMADOL HYDROCHLORIDE 50 MG/1
50 TABLET ORAL EVERY 12 HOURS PRN
Qty: 60 TABLET | Refills: 0 | Status: SHIPPED | OUTPATIENT
Start: 2019-08-10 | End: 2019-07-15

## 2019-06-10 RX ORDER — TRAMADOL HYDROCHLORIDE 50 MG/1
50 TABLET ORAL 2 TIMES DAILY PRN
Qty: 60 TABLET | Refills: 0 | Status: SHIPPED | OUTPATIENT
Start: 2019-07-10 | End: 2019-08-14

## 2019-06-11 NOTE — TELEPHONE ENCOUNTER
Called pharmacy   Called in Tramadol Rx #60 with 2 (fill every 30 days)   Pt informed  Adrianne WAGONER RN

## 2019-06-13 ENCOUNTER — PATIENT OUTREACH (OUTPATIENT)
Dept: OTOLARYNGOLOGY | Facility: CLINIC | Age: 44
End: 2019-06-13

## 2019-06-13 NOTE — PROGRESS NOTES
S-(situation): Patient being contacted with results of CT sinus and CT chest.     B-(background): Patient seen by Dr. Barros on 6/3/2019 for nasal congestion. Patient was prescribed an antibiotic at this visit. Dr. Barros ordered a CT scan of her sinuses and chest. Patient has a CT scan done of her chest in [place] which revealed a finding requiring follow up.     A-(assessment): RN informed patient of the following results.     Finding CT Sinus:   Maxillary sinuses: clear.  Sphenoid sinus: clear.  Frontal sinus: clear.  Ethmoid air cells: clear.     The ostiomeatal units appear patent bilaterally. The bony walls of the  paranasal sinuses are intact.      Normal retromaxillary and pterygopalatine fat.     The adenoid tonsils in the nasopharynx are unremarkable.                                                                      Impression:     No evidence of sinusitis.      FINDINGS CT Chest:     The central tracheobronchial tree is patent. Minimal bibasilar,  dependent, subpleural reticular/ground glass attenuation, consistent  with atelectasis. No focal consolidation. No pneumothorax or pleural  effusion. No suspicious nodules or masses. Right lower lobe granuloma.        The heart size is normal. No pericardial effusion. Normal caliber and  configuration of the thoracic great vessels. No thoracic adenopathy.  Thyroid appears within normal limits.     Limited views of the abdomen reveal no acute pathology. Punctate left  adrenal gland calcification. No suspicious bony or soft tissue  lesions.                                                                      IMPRESSION:   1. No acute airspace disease. No suspicious pulmonary nodules or  masses.  2. Sequela of prior granulomatous disease with findings of calcified  right lower lobe granuloma.    Patient verbalizes understandings of relayed results. Patient states previous chest CT was taken in [place] due to an infection. Patient denies any questions or concerns  related to results.     R-(recommendations): Follow-up in clinic with Dr. Barros as scheduled.     Patient is in agreement with this plan. Patient denies any further questions or concerns at this time.     Estella Enamorado RN

## 2019-07-15 ENCOUNTER — OFFICE VISIT (OUTPATIENT)
Dept: OTOLARYNGOLOGY | Facility: CLINIC | Age: 44
End: 2019-07-15
Payer: COMMERCIAL

## 2019-07-15 VITALS
TEMPERATURE: 99.4 F | HEART RATE: 67 BPM | RESPIRATION RATE: 18 BRPM | HEIGHT: 69 IN | DIASTOLIC BLOOD PRESSURE: 79 MMHG | WEIGHT: 157 LBS | SYSTOLIC BLOOD PRESSURE: 110 MMHG | BODY MASS INDEX: 23.25 KG/M2

## 2019-07-15 DIAGNOSIS — K21.9 LARYNGOPHARYNGEAL REFLUX (LPR): Primary | ICD-10-CM

## 2019-07-15 RX ORDER — OMEPRAZOLE 40 MG/1
40 CAPSULE, DELAYED RELEASE ORAL DAILY
Qty: 60 CAPSULE | Refills: 0 | Status: SHIPPED | OUTPATIENT
Start: 2019-07-15 | End: 2019-10-28

## 2019-07-15 ASSESSMENT — MIFFLIN-ST. JEOR: SCORE: 1419.91

## 2019-07-15 ASSESSMENT — PAIN SCALES - GENERAL: PAINLEVEL: MILD PAIN (2)

## 2019-07-15 NOTE — PROGRESS NOTES
HISTORY OF PRESENT ILLNESS:  Renetta is back to see us again today.  She is doing fairly well.  We did review with her the scans, which demonstrate no significant abnormality on the CT scan of the nose or the sinuses.  She does continue to have a sense of postnasal drainage and irritation in her stomach and otherwise some left-sided facial pressure, which is intermittent.      PHYSICAL EXAMINATION:  The patient is in no distress, alert and appropriate.  Breathing without difficulty or stridor.  Eyes are anicteric.  Skin of the head and neck appears normal.  Examination of the mouth shows normal tongue, buccal mucosa, and oropharynx.  Anterior nasal exam does show a right-sided septal deviation.        ASSESSMENT/PLAN:  At this point, we discussed options.  I think most of her symptoms may be able to be resolved with treatment of her reflux.  I am going to have her on omeprazole 40 mg a day for the next six weeks and then see her back.  At that point, if she is doing better, we could switch her to ranitidine.  In the meantime, she will call us should she get symptoms consistent with her left sinus pressure and opacification.

## 2019-07-15 NOTE — NURSING NOTE
"Chief Complaint   Patient presents with     RECHECK     follow up    Blood pressure 110/79, pulse 67, temperature 99.4  F (37.4  C), resp. rate 18, height 1.75 m (5' 8.9\"), weight 71.2 kg (157 lb), not currently breastfeeding.      Aleks Childress LPN    "

## 2019-07-15 NOTE — PATIENT INSTRUCTIONS
You were seen in the ENT clinic today with Dr. Barros    Recommendations for you:    -Please begin using Omeprazole.       We would like you to follow up in 6 weeks.       Please call our clinic for any questions, concerns, and/or worsening symptoms.      Clinic #[phone]       Option 1 for scheduling.    Thank you for allowing us to be apart of your care!    Estella VENTURA RNCC    If you need to reach me my direct line is: [phone]

## 2019-07-15 NOTE — LETTER
7/15/2019       RE: Renetta Lauren  [address]  [place] [facility] [address]     Dear Colleague,    Thank you for referring your patient, Renetta Lauren, to the [facility] EAR NOSE AND THROAT at [facility]. Please see a copy of my visit note below.    HISTORY OF PRESENT ILLNESS:  Renetta is back to see us again today.  She is doing fairly well.  We did review with her the scans, which demonstrate no significant abnormality on the CT scan of the nose or the sinuses.  She does continue to have a sense of postnasal drainage and irritation in her stomach and otherwise some left-sided facial pressure, which is intermittent.      PHYSICAL EXAMINATION:  The patient is in no distress, alert and appropriate.  Breathing without difficulty or stridor.  Eyes are anicteric.  Skin of the head and neck appears normal.  Examination of the mouth shows normal tongue, buccal mucosa, and oropharynx.  Anterior nasal exam does show a right-sided septal deviation.        ASSESSMENT/PLAN:  At this point, we discussed options.  I think most of her symptoms may be able to be resolved with treatment of her reflux.  I am going to have her on omeprazole 40 mg a day for the next six weeks and then see her back.  At that point, if she is doing better, we could switch her to ranitidine.  In the meantime, she will call us should she get symptoms consistent with her left sinus pressure and opacification.         Again, thank you for allowing me to participate in the care of your patient.      Sincerely,    Saad Barros MD

## 2019-08-06 DIAGNOSIS — M25.531 RIGHT WRIST PAIN: ICD-10-CM

## 2019-08-06 DIAGNOSIS — G89.4 CHRONIC PAIN SYNDROME: ICD-10-CM

## 2019-08-06 NOTE — TELEPHONE ENCOUNTER
traMADol (ULTRAM) 50 MG tablet  Last Written Prescription Date:  06/10/2019  Last Fill Quantity: 60,  # refills: 0   Last office visit: 3/8/2019 with prescribing provider:  STEPHEN   Future Office Visit:  Nothing at this time scheduled.    Requested Prescriptions   Pending Prescriptions Disp Refills     traMADol (ULTRAM) 50 MG tablet 60 tablet 0     Sig: Take 1 tablet (50 mg) by mouth 2 times daily as needed for moderate pain       There is no refill protocol information for this order

## 2019-08-07 ENCOUNTER — MYC MEDICAL ADVICE (OUTPATIENT)
Dept: FAMILY MEDICINE | Facility: CLINIC | Age: 44
End: 2019-08-07

## 2019-08-07 RX ORDER — TRAMADOL HYDROCHLORIDE 50 MG/1
50 TABLET ORAL 2 TIMES DAILY PRN
Qty: 60 TABLET | Refills: 0 | OUTPATIENT
Start: 2019-08-09

## 2019-08-07 NOTE — TELEPHONE ENCOUNTER
FS  Controlled Substance Refill Request for Tramadol    Last refill: 7/9/2019 - #60    Last clinic visit: 3/8/2019     Clinic visit frequency required: Face to Face visit every 6 months, 3 months in between virtual visit.   Do you want to do a virtual visit or see patient since 6 months is 9/8?      Controlled substance agreement on file: Yes:  Date 5/7/2018.    Documentation in problem list reviewed:  Yes    Processing:  Fax Rx to [address] pharmacy    [place] reviewed[place]  reviewed- no concerns  [place]  [email or web address]    Thanks,  Devika Canseco RN

## 2019-08-08 NOTE — TELEPHONE ENCOUNTER
Patient has not read [employer] message.  VM left asking patient to call clinic.  Devika Canseco RN

## 2019-08-14 ENCOUNTER — OFFICE VISIT (OUTPATIENT)
Dept: FAMILY MEDICINE | Facility: CLINIC | Age: 44
End: 2019-08-14
Payer: COMMERCIAL

## 2019-08-14 VITALS
WEIGHT: 157 LBS | TEMPERATURE: 98.2 F | DIASTOLIC BLOOD PRESSURE: 82 MMHG | HEIGHT: 69 IN | HEART RATE: 58 BPM | BODY MASS INDEX: 23.25 KG/M2 | SYSTOLIC BLOOD PRESSURE: 124 MMHG | OXYGEN SATURATION: 98 %

## 2019-08-14 DIAGNOSIS — G89.4 CHRONIC PAIN SYNDROME: ICD-10-CM

## 2019-08-14 DIAGNOSIS — E78.5 HYPERLIPIDEMIA LDL GOAL <160: ICD-10-CM

## 2019-08-14 DIAGNOSIS — M25.531 RIGHT WRIST PAIN: Primary | ICD-10-CM

## 2019-08-14 PROCEDURE — 99214 OFFICE O/P EST MOD 30 MIN: CPT | Performed by: FAMILY MEDICINE

## 2019-08-14 PROCEDURE — 99000 SPECIMEN HANDLING OFFICE-LAB: CPT | Performed by: FAMILY MEDICINE

## 2019-08-14 PROCEDURE — 80307 DRUG TEST PRSMV CHEM ANLYZR: CPT | Mod: 90 | Performed by: FAMILY MEDICINE

## 2019-08-14 RX ORDER — TRAMADOL HYDROCHLORIDE 50 MG/1
50 TABLET ORAL 2 TIMES DAILY
Qty: 10 TABLET | Refills: 0 | Status: CANCELLED | OUTPATIENT
Start: 2019-08-14 | End: 2019-09-13

## 2019-08-14 RX ORDER — TRAMADOL HYDROCHLORIDE 50 MG/1
50 TABLET ORAL 2 TIMES DAILY
Qty: 60 TABLET | Refills: 0 | Status: CANCELLED | OUTPATIENT
Start: 2019-08-14 | End: 2019-09-13

## 2019-08-14 RX ORDER — TRAMADOL HYDROCHLORIDE 50 MG/1
TABLET ORAL
Qty: 45 TABLET | Refills: 0 | Status: SHIPPED | OUTPATIENT
Start: 2019-08-14 | End: 2019-09-04

## 2019-08-14 ASSESSMENT — ANXIETY QUESTIONNAIRES
2. NOT BEING ABLE TO STOP OR CONTROL WORRYING: NOT AT ALL
6. BECOMING EASILY ANNOYED OR IRRITABLE: NOT AT ALL
1. FEELING NERVOUS, ANXIOUS, OR ON EDGE: SEVERAL DAYS
5. BEING SO RESTLESS THAT IT IS HARD TO SIT STILL: NOT AT ALL
7. FEELING AFRAID AS IF SOMETHING AWFUL MIGHT HAPPEN: NOT AT ALL
IF YOU CHECKED OFF ANY PROBLEMS ON THIS QUESTIONNAIRE, HOW DIFFICULT HAVE THESE PROBLEMS MADE IT FOR YOU TO DO YOUR WORK, TAKE CARE OF THINGS AT HOME, OR GET ALONG WITH OTHER PEOPLE: NOT DIFFICULT AT ALL
3. WORRYING TOO MUCH ABOUT DIFFERENT THINGS: NOT AT ALL
GAD7 TOTAL SCORE: 1

## 2019-08-14 ASSESSMENT — PATIENT HEALTH QUESTIONNAIRE - PHQ9
SUM OF ALL RESPONSES TO PHQ QUESTIONS 1-9: 0
5. POOR APPETITE OR OVEREATING: NOT AT ALL

## 2019-08-14 ASSESSMENT — MIFFLIN-ST. JEOR: SCORE: 1419.94

## 2019-08-14 NOTE — PROGRESS NOTES
Subjective     Renetta Lauren is a 44 year old female who presents to clinic today for the following health issues:    HPI     3 month follow up - Tramadol.    States it is very effective, and she does not have to take ibuprofen.  Also, the patient has chronic wrist pain. Pain is moderate at night. Patient is not able to sleep due to pain.  She attempted occupational therapy and massage without significant relief in her symptoms.  The patient was seen at the pain clinic previously.        Patient Active Problem List   Diagnosis     CARDIOVASCULAR SCREENING; LDL GOAL LESS THAN 160     PPROM Elevated Risk Factor     Need for Tdap vaccination      premature rupture of membranes (PPROM) with onset of labor after 24 hours of rupture in second trimester, antepartum     Post-nasal drip     Screening for cervical cancer     Migraine without aura and without status migrainosus, not intractable     Human papilloma virus (HPV) infection     Chronic pain syndrome     Past Surgical History:   Procedure Laterality Date     C APPENDECTOMY       C STOMACH SURGERY PROCEDURE UNLISTED      Appendectomy     GENITOURINARY SURGERY      postpartum d&c     OPERATIVE HYSTEROSCOPY WITH MORCELLATOR N/A 2015    Procedure: OPERATIVE HYSTEROSCOPY WITH MORCELLATOR;  Surgeon: Reyna Marshall MD;  Location:  OR       Social History     Tobacco Use     Smoking status: Never Smoker     Smokeless tobacco: Never Used   Substance Use Topics     Alcohol use: Yes     Alcohol/week: 0.0 oz     Comment: 1-2 glasses of wine per week     Family History   Problem Relation Age of Onset     Family History Negative Other      Hypertension Mother      Other Cancer Mother      Cancer Mother      Family History Negative Father      Coronary Artery Disease Maternal Grandfather      Hypertension Maternal Grandfather            Reviewed and updated as needed this visit by Provider         Review of Systems   ROS COMP: Constitutional,  "HEENT, cardiovascular, pulmonary, gi and gu systems are negative, except as otherwise noted.      Objective    /82 (BP Location: Left arm, Cuff Size: Adult Regular)   Pulse 58   Temp 98.2  F (36.8  C) (Oral)   Ht 1.75 m (5' 8.9\")   Wt 71.2 kg (157 lb)   SpO2 98%   Breastfeeding? No   BMI 23.25 kg/m    Body mass index is 23.25 kg/m .  Physical Exam   GENERAL: healthy, alert and no distress  RESP: lungs clear to auscultation - no rales, rhonchi or wheezes  CV: regular rate and rhythm, normal S1 S2, no S3 or S4, no murmur, click or rub, no peripheral edema and peripheral pulses strong  PSYCH: mentation appears normal, affect normal/bright    Diagnostic Test Results:  Labs reviewed in Epic        Assessment & Plan       ICD-10-CM    1. Right wrist pain M25.531 Drug  Screen Comprehensive , Urine with Reported Meds (MedTox) (Pain Care Package)     traMADol (ULTRAM) 50 MG tablet   2. Chronic pain syndrome G89.4 Drug  Screen Comprehensive , Urine with Reported Meds (MedTox) (Pain Care Package)     traMADol (ULTRAM) 50 MG tablet   3. Hyperlipidemia LDL goal <160 E78.5 Lipid panel reflex to direct LDL Fasting      Patient was given a 1 month supply only.     Decreased quantity to 45 tabs per month.     Return in about 6 months (around 2/14/2020).    Kendall Garza MD  [facility]    "

## 2019-08-15 ASSESSMENT — ANXIETY QUESTIONNAIRES: GAD7 TOTAL SCORE: 1

## 2019-08-16 DIAGNOSIS — G43.009 MIGRAINE WITHOUT AURA AND WITHOUT STATUS MIGRAINOSUS, NOT INTRACTABLE: ICD-10-CM

## 2019-08-16 NOTE — TELEPHONE ENCOUNTER
"Prozac 20MG   Last Written Prescription Date:  12/03/2018  Last Fill Quantity: 90,  # refills: 1   Last office visit: 8/14/2019 with prescribing provider:  Yes    Future Office Visit:      Requested Prescriptions   Pending Prescriptions Disp Refills     FLUoxetine (PROZAC) 20 MG capsule 90 capsule 1     Sig: Take 1 capsule (20 mg) by mouth daily       SSRIs Protocol Passed - 8/16/2019  9:30 AM        Passed - Recent (12 mo) or future (30 days) visit within the authorizing provider's specialty     Patient had office visit in the last 12 months or has a visit in the next 30 days with authorizing provider or within the authorizing provider's specialty.  See \"Patient Info\" tab in inbasket, or \"Choose Columns\" in Meds & Orders section of the refill encounter.              Passed - Medication is active on med list        Passed - Patient is age 18 or older        Passed - No active pregnancy on record        Passed - No positive pregnancy test in last 12 months          "

## 2019-08-18 LAB — PAIN DRUG SCR UR W RPTD MEDS: NORMAL

## 2019-08-19 NOTE — RESULT ENCOUNTER NOTE
Dear Renetta,   Here are your recent results which are within the expected range.    tramadol did not show up in your urine drug screen. This means that you have not taken it for a few days.       Take care  Kendall Garza MD

## 2019-09-29 ENCOUNTER — HEALTH MAINTENANCE LETTER (OUTPATIENT)
Age: 44
End: 2019-09-29

## 2019-10-01 ENCOUNTER — MYC REFILL (OUTPATIENT)
Dept: FAMILY MEDICINE | Facility: CLINIC | Age: 44
End: 2019-10-01

## 2019-10-01 DIAGNOSIS — M25.531 RIGHT WRIST PAIN: ICD-10-CM

## 2019-10-01 DIAGNOSIS — G89.4 CHRONIC PAIN SYNDROME: ICD-10-CM

## 2019-10-01 RX ORDER — TRAMADOL HYDROCHLORIDE 50 MG/1
TABLET ORAL
Qty: 45 TABLET | Refills: 0 | Status: CANCELLED | OUTPATIENT
Start: 2019-10-01

## 2019-10-01 NOTE — TELEPHONE ENCOUNTER
Per  last refill 9/5/19 for #45.  Problem list #45/month.    Was refilled earlier than FS requested it to be filled.  Rx states can be filled on 9/9 or 9/10.    Devika Canseco RN

## 2019-10-07 ENCOUNTER — MYC REFILL (OUTPATIENT)
Dept: FAMILY MEDICINE | Facility: CLINIC | Age: 44
End: 2019-10-07

## 2019-10-07 DIAGNOSIS — M25.531 RIGHT WRIST PAIN: ICD-10-CM

## 2019-10-07 DIAGNOSIS — G89.4 CHRONIC PAIN SYNDROME: ICD-10-CM

## 2019-10-08 RX ORDER — TRAMADOL HYDROCHLORIDE 50 MG/1
TABLET ORAL
Qty: 45 TABLET | Refills: 0 | Status: CANCELLED | OUTPATIENT
Start: 2019-10-08

## 2019-10-08 NOTE — TELEPHONE ENCOUNTER
Controlled Substance Refill Request for Tramadol    Last refill: 9/5/19 #45    Last clinic visit: 8/14/19     Clinic visit frequency required: Q 6  Months face to face, virtual Q 3 months   Next appt:     Controlled substance agreement on file: yes 8/28/18    Documentation in problem list reviewed:  Yes    Processing:  Fax Rx to [facility] pharmacy    [place] reviewed:  reviewed- no concerns  [place]  [email or web address]

## 2019-10-09 DIAGNOSIS — M25.531 RIGHT WRIST PAIN: ICD-10-CM

## 2019-10-09 DIAGNOSIS — G89.4 CHRONIC PAIN SYNDROME: ICD-10-CM

## 2019-10-09 RX ORDER — TRAMADOL HYDROCHLORIDE 50 MG/1
TABLET ORAL
Qty: 45 TABLET | Refills: 0 | Status: SHIPPED | OUTPATIENT
Start: 2019-10-09 | End: 2019-11-11

## 2019-10-28 ENCOUNTER — HEALTH MAINTENANCE LETTER (OUTPATIENT)
Age: 44
End: 2019-10-28

## 2019-10-28 ENCOUNTER — OFFICE VISIT (OUTPATIENT)
Dept: OTOLARYNGOLOGY | Facility: CLINIC | Age: 44
End: 2019-10-28
Payer: COMMERCIAL

## 2019-10-28 VITALS
BODY MASS INDEX: 22.81 KG/M2 | DIASTOLIC BLOOD PRESSURE: 75 MMHG | OXYGEN SATURATION: 97 % | HEART RATE: 53 BPM | SYSTOLIC BLOOD PRESSURE: 115 MMHG | HEIGHT: 69 IN | WEIGHT: 154 LBS

## 2019-10-28 DIAGNOSIS — R05.9 COUGH: Primary | ICD-10-CM

## 2019-10-28 RX ORDER — CODEINE PHOSPHATE/GUAIFENESIN 10-100MG/5
5 LIQUID (ML) ORAL EVERY 4 HOURS PRN
Qty: 300 ML | Refills: 0 | Status: SHIPPED | OUTPATIENT
Start: 2019-10-28 | End: 2019-11-20

## 2019-10-28 ASSESSMENT — PAIN SCALES - GENERAL: PAINLEVEL: NO PAIN (0)

## 2019-10-28 ASSESSMENT — MIFFLIN-ST. JEOR: SCORE: 1400.04

## 2019-10-28 NOTE — PROGRESS NOTES
HISTORY OF PRESENT ILLNESS:  Ms. Lauren is back to see us again today.  She has been doing fairly well with her drainage until recently when she started having more problems with congestion, sore throat and postnasal drainage causing a productive cough.  She has found that her sinus symptoms have been generally better though.        PHYSICAL EXAMINATION:  She is in no acute distress.  Normal mood, normal affect, normal ability to communicate.  Alert and appropriate.  Head is normocephalic.  Cranial nerve VII is House-Brackmann I out of VI bilaterally.  Breathing without difficulty or stridor.  Eyes are anicteric.  Skin of the head and neck appears normal.  Examination of the nose shows moderate nasal mucosal edema.  No jessie purulence appreciated.  Oral cavity shows normal tongue, buccal mucosa and oropharynx.  Palpation of neck shows no masses, tenderness or lymphadenopathy.  Examination of the ears shows normal external auditory canals and tympanic membranes.      ASSESSMENT AND PLAN:  A 44-year-old with recurrent sinusitis.  Symptoms are currently under better control  but now with a disruptive cough and congestion.  We will plan on treating the cough when particularly bad with codeine cough syrup.  Otherwise, she has a prescription for antibiotics should she feel her symptoms turn into sinusitis and then we will see her as needed in the future.

## 2019-10-28 NOTE — NURSING NOTE
"Chief Complaint   Patient presents with     RECHECK     follow up chronic maillary sinusitis      Blood pressure 115/75, pulse 53, height 1.74 m (5' 8.5\"), weight 69.9 kg (154 lb), SpO2 97 %, not currently breastfeeding.    Aleks Childress LPN    "

## 2019-10-28 NOTE — LETTER
10/28/2019       RE: Renetta Lauren  [address] [place]  [place] [address]     Dear Colleague,    Thank you for referring your patient, Renetta Lauren, to the [facility] at [facility]. Please see a copy of my visit note below.    HISTORY OF PRESENT ILLNESS:  Ms. Lauren is back to see us again today.  She has been doing fairly well with her drainage until recently when she started having more problems with congestion, sore throat and postnasal drainage causing a productive cough.  She has found that her sinus symptoms have been generally better though.        PHYSICAL EXAMINATION:  She is in no acute distress.  Normal mood, normal affect, normal ability to communicate.  Alert and appropriate.  Head is normocephalic.  Cranial nerve VII is House-Brackmann I out of VI bilaterally.  Breathing without difficulty or stridor.  Eyes are anicteric.  Skin of the head and neck appears normal.  Examination of the nose shows moderate nasal mucosal edema.  No jessie purulence appreciated.  Oral cavity shows normal tongue, buccal mucosa and oropharynx.  Palpation of neck shows no masses, tenderness or lymphadenopathy.  Examination of the ears shows normal external auditory canals and tympanic membranes.      ASSESSMENT AND PLAN:  A 44-year-old with recurrent sinusitis.  Symptoms are currently under better control  but now with a disruptive cough and congestion.  We will plan on treating the cough when particularly bad with codeine cough syrup.  Otherwise, she has a prescription for antibiotics should she feel her symptoms turn into sinusitis and then we will see her as needed in the future.         Again, thank you for allowing me to participate in the care of your patient.      Sincerely,    Saad Barros MD

## 2019-11-06 ENCOUNTER — MYC REFILL (OUTPATIENT)
Dept: FAMILY MEDICINE | Facility: CLINIC | Age: 44
End: 2019-11-06

## 2019-11-06 DIAGNOSIS — M25.531 RIGHT WRIST PAIN: ICD-10-CM

## 2019-11-06 DIAGNOSIS — G89.4 CHRONIC PAIN SYNDROME: ICD-10-CM

## 2019-11-06 RX ORDER — TRAMADOL HYDROCHLORIDE 50 MG/1
TABLET ORAL
Qty: 45 TABLET | Refills: 0 | Status: CANCELLED | OUTPATIENT
Start: 2019-11-06

## 2019-11-06 NOTE — TELEPHONE ENCOUNTER
Last OV 19.  Face to face visit Q 6 months and virtual visit 3 months in between.    Devika Canseco RN    Requested Prescriptions   Pending Prescriptions Disp Refills     traMADol (ULTRAM) 50 MG tablet 45 tablet 0     Si-2 tablets daily as needed for pain       There is no refill protocol information for this order

## 2019-11-11 ENCOUNTER — VIRTUAL VISIT (OUTPATIENT)
Dept: FAMILY MEDICINE | Facility: CLINIC | Age: 44
End: 2019-11-11
Payer: COMMERCIAL

## 2019-11-11 DIAGNOSIS — M25.531 RIGHT WRIST PAIN: ICD-10-CM

## 2019-11-11 DIAGNOSIS — G43.709 CHRONIC MIGRAINE WITHOUT AURA WITHOUT STATUS MIGRAINOSUS, NOT INTRACTABLE: Primary | ICD-10-CM

## 2019-11-11 DIAGNOSIS — G89.4 CHRONIC PAIN SYNDROME: ICD-10-CM

## 2019-11-11 DIAGNOSIS — G43.009 MIGRAINE WITHOUT AURA AND WITHOUT STATUS MIGRAINOSUS, NOT INTRACTABLE: Primary | ICD-10-CM

## 2019-11-11 PROCEDURE — 99441 ZZC PHYSICIAN TELEPHONE EVALUATION 5-10 MIN: CPT | Performed by: FAMILY MEDICINE

## 2019-11-11 RX ORDER — TRAMADOL HYDROCHLORIDE 50 MG/1
TABLET ORAL
Qty: 45 TABLET | Refills: 0 | Status: SHIPPED | OUTPATIENT
Start: 2019-11-11 | End: 2019-11-11

## 2019-11-11 RX ORDER — TRAMADOL HYDROCHLORIDE 50 MG/1
TABLET ORAL
Qty: 45 TABLET | Refills: 0 | Status: SHIPPED | OUTPATIENT
Start: 2019-12-11 | End: 2019-11-11

## 2019-11-11 RX ORDER — TRAMADOL HYDROCHLORIDE 50 MG/1
TABLET ORAL
Qty: 45 TABLET | Refills: 0 | Status: SHIPPED | OUTPATIENT
Start: 2020-01-10 | End: 2019-11-11

## 2019-11-11 RX ORDER — TRAMADOL HYDROCHLORIDE 50 MG/1
TABLET ORAL
Qty: 45 TABLET | Refills: 0 | Status: SHIPPED | OUTPATIENT
Start: 2019-01-10 | End: 2019-12-09

## 2019-11-11 RX ORDER — SUMATRIPTAN 50 MG/1
50 TABLET, FILM COATED ORAL
Qty: 30 TABLET | Refills: 0 | Status: SHIPPED | OUTPATIENT
Start: 2019-11-11 | End: 2020-02-07

## 2019-11-11 NOTE — TELEPHONE ENCOUNTER
"I sent it a few times the first one said \"confirmation received by the pharmacy\" but for the other two, it keeps telling me that transmission failed.     Can you please check with the pharmacy.     Kayla  "

## 2019-11-11 NOTE — TELEPHONE ENCOUNTER
FS,   Looks like you sent in Tramadol Rx  Transmission to pharmacy failed  Please resend   Thanks,  Adrianne WAGONER RN

## 2019-11-11 NOTE — TELEPHONE ENCOUNTER
FS    Called [facility] pharmacy.  State they did received Tramadol Rx and in process of refilling.    Thanks,  Devika Canseco RN

## 2019-11-11 NOTE — PROGRESS NOTES
"Renetta Lauren is a 44 year old female who is being evaluated via a telephone visit.      The patient has been notified of following (by M.A) Gayle Moses MA  Medical Assistant  [facility]       \"We have found that certain health care needs can be provided without the need for a physical exam.  This service lets us provide the care you need with a short phone conversation.  If a prescription is necessary we can send it directly to your pharmacy.  If lab work is needed we can place an order for that and you can then stop by our lab to have the test done at a later time.    This telephone visit will be conducted via 3 way call with the you (the patient) , the physician/provider, and a me all on the line at the same time.  This allows your physician/provider to have the phone conversation with you while I will be taking notes for your medical record.  We will have full access to your [facility] medical record during this entire phone call.    Since this is like an office visit,  will bill your insurance company for this service.  Please check with your medical insurance if this type of telephone/virtual is covered . You may be responsible for the cost of this service if insurance coverage is denied.  The typical cost is $30 (10min), $59(11-20min) and $85 (21-30min)     If during the course of the call the physician/provider feels a telephone visit is not appropriate, you will not be charged for this service\"    Consent has been obtained for this service by care team member: yes.  See the scanned image in the medical record.    S: Pertinent parts of the the patient's medical history reviewed and confirmed by the provider included :   The patient has a hx of chronic pain in both wrists. She was seen at the pain clinic. She continues to take Tramadol 45 tablets per month.     Also, she has been having intermittent headaches. She was previously taking Tylenol 3 but does not have any anymore. "       Assessment/Plan:    ICD-10-CM    1. Chronic migraine without aura without status migrainosus, not intractable G43.709    2. Chronic pain syndrome G89.4      Medications sent.   Tramadol x 3 months.   Imitrex for migraines. Advised not to use Tramadol for migraines.     Total time of call between patient and provider was 7 minutes     Kendall Garza MD  [facility]  PAGER: [phone] or (W): [phone]

## 2019-11-20 ENCOUNTER — MYC REFILL (OUTPATIENT)
Dept: OTOLARYNGOLOGY | Facility: CLINIC | Age: 44
End: 2019-11-20

## 2019-11-20 DIAGNOSIS — R05.9 COUGH: ICD-10-CM

## 2019-11-25 ENCOUNTER — PATIENT OUTREACH (OUTPATIENT)
Dept: OTOLARYNGOLOGY | Facility: CLINIC | Age: 44
End: 2019-11-25

## 2019-11-25 RX ORDER — CODEINE PHOSPHATE/GUAIFENESIN 10-100MG/5
5 LIQUID (ML) ORAL EVERY 4 HOURS PRN
Qty: 300 ML | Refills: 0 | Status: SHIPPED | OUTPATIENT
Start: 2019-11-25 | End: 2020-02-13

## 2019-11-25 NOTE — TELEPHONE ENCOUNTER
Prescription refill approved per Dr. Barros. Paper prescription signed by Dr. Barros. RN awaiting instructions from patient as how to she would like to proceed with picking this up.     Estella Enamorado RN

## 2019-11-26 ENCOUNTER — TELEPHONE (OUTPATIENT)
Dept: OTOLARYNGOLOGY | Facility: CLINIC | Age: 44
End: 2019-11-26

## 2019-11-26 ENCOUNTER — OFFICE VISIT (OUTPATIENT)
Dept: OBGYN | Facility: CLINIC | Age: 44
End: 2019-11-26
Payer: COMMERCIAL

## 2019-11-26 VITALS
SYSTOLIC BLOOD PRESSURE: 133 MMHG | HEART RATE: 63 BPM | WEIGHT: 154 LBS | OXYGEN SATURATION: 96 % | DIASTOLIC BLOOD PRESSURE: 81 MMHG | BODY MASS INDEX: 23.34 KG/M2 | HEIGHT: 68 IN

## 2019-11-26 DIAGNOSIS — Z30.430 ENCOUNTER FOR IUD INSERTION: ICD-10-CM

## 2019-11-26 DIAGNOSIS — Z30.09 GENERAL COUNSELING AND ADVICE FOR CONTRACEPTIVE MANAGEMENT: Primary | ICD-10-CM

## 2019-11-26 LAB — HCG UR QL: NEGATIVE

## 2019-11-26 PROCEDURE — 58300 INSERT INTRAUTERINE DEVICE: CPT | Performed by: OBSTETRICS & GYNECOLOGY

## 2019-11-26 PROCEDURE — 81025 URINE PREGNANCY TEST: CPT | Performed by: OBSTETRICS & GYNECOLOGY

## 2019-11-26 PROCEDURE — 99202 OFFICE O/P NEW SF 15 MIN: CPT | Mod: 25 | Performed by: OBSTETRICS & GYNECOLOGY

## 2019-11-26 ASSESSMENT — MIFFLIN-ST. JEOR: SCORE: 1392.04

## 2019-11-26 NOTE — PATIENT INSTRUCTIONS
What Mirena/Kyleena Users May Expect    What to watch for right after Mirena is placed  Some women may experience uterine cramps, bleeding, and/or dizziness during and right after Mirena is placed. To help minimize the cramps, you may taken ibuprofen 600 mg with food prior to your appointment. These symptoms should improve over the next 24 hours.  Mild cramping may be present for a few days after your placement  As a follow up, you should check your strings on 4 weeks or visit your clinic once in the first 4 to 12 weeks after Mirena is placed to make sure it is in the right position. After that, Mirena can be checked once a year as part of your routine exam.    Please use a back-up method (abstinence or condoms) for 5 days after placement.    Your periods may change  For the first 3 to 6 months, your monthly period may become irregular. You may also have frequent spotting or light bleeding. A few women have heavy bleeding during this time. After your body adjusts, the number of bleeding days is likely to decrease (but may remain irregular), and you may even find that your periods stop altogether for as long as Mirena is in place. Around the end of the third month of use, you may see up to a 75% reduction in the amount of menstrual bleeding. By one year, about 1 out of 5 users may hay have no period at all. At the end of two years, 70% have little or no bleeding. Your periods will return rapidly once Mirena is removed.     Mirena Strings  You may check your own Mirena strings by inserting a finger into the vagina and feeling the strings as they exit the cervix.  The strings will initially feel firm, like fishing line, but will soften over a few weeks.  After the strings have softened, you or your partner should not be able to feel the strings during intercourse.  If you can feel the IUD, see your healthcare provider to have the position confirmed.  You may use tampons with Mirena in place.    Mirena does not protect  against HIV or STDs.  Mirena does not prevent the formation of ovarian cysts.  Mirena does not typically reduce acne or cause weight gain or mood changes.    Please call [facility] at [phone] if you have questions or concerns.    For more information:  [email or web address]

## 2019-11-26 NOTE — PROGRESS NOTES
"S:  Renetta presents for counseling on contraception.   Just recently became sexually active since the birth of her child 4 years ago. That pregnancy was complicated by chronic subchorionic hemorrhage and PPROM. She was hospitalized for several months and had PTD at 34 weeks.   She has not been considering pregnancy again, though could be open to it.   Her new partner may want another child.   She does not want unplanned pregnancy.   Wondering about the risk of pregnancy at age 44 and with her history.  Has not tolerated OCPs well.    O:  Vitals:    19 1138   BP: 133/81   Pulse: 63   SpO2: 96%   Weight: 69.9 kg (154 lb)   Height: 1.727 m (5' 8\")     Gen: well appearing  Abd: soft, NT  : normal external genitalia, cervix parous, no lesions.    A/P:  44 year old  here for contraceptive counseling  - Discussed options: Pills, patch, ring, Nexplanon, depo Provera, IUDs, barriers.   - Discussed methods, side effects, efficacy, bleeding profile  - Discussed IUD options in detail. Discussed differences between IUDs, placement, risks, bleeding profiles   - Discussed these methods do not prevent STI without condoms  - Discussed AMA. Discussed risk of aneuploidy and fetal loss at age 44. Discussed PPROM/PTD can be recurrent, she may be eligible for Birdseye. Se definitely wants contraception at this time and elects to proceed with Kyleena.     IUD INSERTION PROCEDURE    Renetta Lauren is a 44 year old female  who presents for Kyleena IUD insertion.  Indication for IUD insertion is contraception.  Patient's last menstrual period was 2019. .  The patient is currently using none for contraception.  She is in a monogamous sexual relationship.     Lab Results   Component Value Date    PAP NIL 2016       Results for orders placed or performed in visit on 19   HCG qualitative urine     Status: None   Result Value Ref Range    HCG Qual Urine Negative NEG^Negative       A complete discussion of the " risks and benefits of IUD use and the details of the insertion procedure was held with the patient.    All questions were answered.  A consent form was signed.    Prior to the beginning of the procedure the team paused to verify the patient's identity, as well as the procedure to be performed and the site.  All equipment required was ready and available. The patient was positioned appropriately.     IUD Lot # see scanned documents    The patient was placed in low lithotomy.  A bimanual exam was performed and the uterus noted to be anteverted.  A speculum was placed and the cervix swabbed with Betadine.  A tenaculum was placed on the anterior cervical lip. A paracervical block was not performed.  The fundus sounded to 7 cm. The Kyleena IUD was placed to the uterine fundus without difficulty.  The strings were cut to 3 cms.  The tenaculum was removed and hemostasis was ensured.  The speculum was removed.  The patient tolerated the procedure well.    PLAN:   The patient was asked to contact the clinic for any fever/chills/severe pelvic or abdominal pain or heavy bleeding. She was instructed in how to palpate her IUD strings.    FOLLOW-UP:  She was asked to follow up prn, and for her routine annual screening.     20 min was spent with this patient that does not include time spent on the procedure and over 50 % of the 20 min was spent counseling on above issues

## 2019-11-26 NOTE — TELEPHONE ENCOUNTER
[facility] Call Center    Phone Message    May a detailed message be left on voicemail: yes    Reason for Call: Other: Pt is calling back to let Dr. Barros's Rn know, pt would like it sent to the [facility] pharmacy and she will pick it up tonight.  This is for the cough syrup with codeine     Action Taken: Message routed to:  [facility] ([facility]): ent

## 2019-12-09 ENCOUNTER — MYC REFILL (OUTPATIENT)
Dept: FAMILY MEDICINE | Facility: CLINIC | Age: 44
End: 2019-12-09

## 2019-12-09 DIAGNOSIS — G89.4 CHRONIC PAIN SYNDROME: ICD-10-CM

## 2019-12-09 DIAGNOSIS — M25.531 RIGHT WRIST PAIN: ICD-10-CM

## 2019-12-10 NOTE — TELEPHONE ENCOUNTER
FS,     Controlled Substance Refill Request for Tramadol    Last refill: 11/11/2019 #45    Last clinic visit: 8/14/2019 face to face, 11/11/2019 virtual visit    Clinic visit frequency required: Q 3 months face to face, Q 6 months virtual visit   Next appt: none    Controlled substance agreement on file: Yes:  Date 3/8/2019.    Documentation in problem list reviewed:  Yes    Processing:  Rx to be electronically transmitted to pharmacy by provider     [place] reviewed:  reviewed- recommend provider review, pt filled Robitussin AC x2 from outside provider  [place]:  [email or web address]    Please authorize if appropriate.  Thanks,  Adrianne WAGONER RN

## 2019-12-11 RX ORDER — TRAMADOL HYDROCHLORIDE 50 MG/1
TABLET ORAL
Qty: 45 TABLET | Refills: 0 | Status: SHIPPED | OUTPATIENT
Start: 2019-12-11 | End: 2020-01-08

## 2020-01-08 ENCOUNTER — MYC REFILL (OUTPATIENT)
Dept: FAMILY MEDICINE | Facility: CLINIC | Age: 45
End: 2020-01-08

## 2020-01-08 DIAGNOSIS — G89.4 CHRONIC PAIN SYNDROME: ICD-10-CM

## 2020-01-08 DIAGNOSIS — M25.531 RIGHT WRIST PAIN: ICD-10-CM

## 2020-01-08 RX ORDER — TRAMADOL HYDROCHLORIDE 50 MG/1
TABLET ORAL
Qty: 45 TABLET | Refills: 0 | Status: SHIPPED | OUTPATIENT
Start: 2020-01-08 | End: 2020-02-17

## 2020-01-08 NOTE — TELEPHONE ENCOUNTER
FS,     Controlled Substance Refill Request for Tramadol    Last refill: 12/11/2019 #45    Last clinic visit: 8/14/2019     Clinic visit frequency required: Q 3 months  Next appt: none    Controlled substance agreement on file: Yes:  Date 5/7/2018.    Documentation in problem list reviewed:  Yes    Processing:  Rx to be electronically transmitted to pharmacy by provider     [place] reviewed:  reviewed- no concerns  [place]:  [email or web address]    Please authorize if appropriate.  Thanks,  Adrianne WAGONER RN      
no vomiting/no nausea

## 2020-02-13 ENCOUNTER — OFFICE VISIT (OUTPATIENT)
Dept: FAMILY MEDICINE | Facility: CLINIC | Age: 45
End: 2020-02-13
Payer: COMMERCIAL

## 2020-02-13 VITALS
SYSTOLIC BLOOD PRESSURE: 127 MMHG | WEIGHT: 154 LBS | BODY MASS INDEX: 23.34 KG/M2 | RESPIRATION RATE: 16 BRPM | TEMPERATURE: 99 F | OXYGEN SATURATION: 96 % | HEIGHT: 68 IN | DIASTOLIC BLOOD PRESSURE: 83 MMHG | HEART RATE: 60 BPM

## 2020-02-13 DIAGNOSIS — G43.009 MIGRAINE WITHOUT AURA AND WITHOUT STATUS MIGRAINOSUS, NOT INTRACTABLE: ICD-10-CM

## 2020-02-13 DIAGNOSIS — G89.4 CHRONIC PAIN SYNDROME: ICD-10-CM

## 2020-02-13 DIAGNOSIS — M25.531 RIGHT WRIST PAIN: ICD-10-CM

## 2020-02-13 PROCEDURE — 99214 OFFICE O/P EST MOD 30 MIN: CPT | Performed by: FAMILY MEDICINE

## 2020-02-13 RX ORDER — TRAMADOL HYDROCHLORIDE 50 MG/1
TABLET ORAL
Qty: 45 TABLET | Refills: 0 | Status: CANCELLED | OUTPATIENT
Start: 2020-02-13 | End: 2020-03-13

## 2020-02-13 ASSESSMENT — PATIENT HEALTH QUESTIONNAIRE - PHQ9: SUM OF ALL RESPONSES TO PHQ QUESTIONS 1-9: 1

## 2020-02-13 ASSESSMENT — MIFFLIN-ST. JEOR: SCORE: 1392.04

## 2020-02-13 NOTE — NURSING NOTE
"Chief Complaint   Patient presents with     Headache     /83   Pulse 60   Temp 99  F (37.2  C) (Oral)   Resp 16   Ht 1.727 m (5' 8\")   Wt 69.9 kg (154 lb)   LMP 02/11/2020   SpO2 96%   BMI 23.42 kg/m   Estimated body mass index is 23.42 kg/m  as calculated from the following:    Height as of this encounter: 1.727 m (5' 8\").    Weight as of this encounter: 69.9 kg (154 lb).  bp completed using cuff size: regular       Health Maintenance addressed:  NONE    n/a    Sho Strange CMA, MA     "

## 2020-02-13 NOTE — PROGRESS NOTES
Subjective     Renetta Lauren is a 44 year old female who presents to clinic today for the following health issues:    HPI   Migraine     Since your last clinic visit, how have your headaches changed?  Worsened    How often are you getting headaches or migraines? 1-2     Are you able to do normal daily activities when you have a migraine? Yes    Are you taking rescue/relief medications? (Select all that apply) sumatriptan (Imitrex)    How helpful is your rescue/relief medication?  I get total relief    Are you taking any medications to prevent migraines? (Select all that apply)  No    In the past 4 weeks, how often have you gone to urgent care or the emergency room because of your headaches?  0      How many servings of fruits and vegetables do you eat daily?  4 or more    On average, how many sweetened beverages do you drink each day (Examples: soda, juice, sweet tea, etc.  Do NOT count diet or artificially sweetened beverages)?   0    How many days per week do you exercise enough to make your heart beat faster? 4    How many minutes a day do you exercise enough to make your heart beat faster? 30 - 60    How many days per week do you miss taking your medication? 0    Patient Active Problem List   Diagnosis     CARDIOVASCULAR SCREENING; LDL GOAL LESS THAN 160     PPROM Elevated Risk Factor     Need for Tdap vaccination      premature rupture of membranes (PPROM) with onset of labor after 24 hours of rupture in second trimester, antepartum     Post-nasal drip     Screening for cervical cancer     Migraine without aura and without status migrainosus, not intractable     Human papilloma virus (HPV) infection     Chronic pain syndrome     Past Surgical History:   Procedure Laterality Date     C APPENDECTOMY       C STOMACH SURGERY PROCEDURE UNLISTED      Appendectomy     GENITOURINARY SURGERY      postpartum d&c     OPERATIVE HYSTEROSCOPY WITH MORCELLATOR N/A 2015    Procedure: OPERATIVE  "HYSTEROSCOPY WITH MORCELLATOR;  Surgeon: Reyna Marshall MD;  Location: [facility]       Social History     Tobacco Use     Smoking status: Never Smoker     Smokeless tobacco: Never Used   Substance Use Topics     Alcohol use: Yes     Alcohol/week: 0.0 standard drinks     Comment: 1-2 glasses of wine per week     Family History   Problem Relation Age of Onset     Family History Negative Other      Hypertension Mother      Other Cancer Mother      Cancer Mother      Family History Negative Father      Coronary Artery Disease Maternal Grandfather      Hypertension Maternal Grandfather            Reviewed and updated as needed this visit by Provider       3  Review of Systems   ROS COMP: Constitutional, HEENT, cardiovascular, pulmonary, gi and gu systems are negative, except as otherwise noted.      Objective    /83   Pulse 60   Temp 99  F (37.2  C) (Oral)   Resp 16   Ht 1.727 m (5' 8\")   Wt 69.9 kg (154 lb)   LMP 02/11/2020   SpO2 96%   BMI 23.42 kg/m    Body mass index is 23.42 kg/m .  Physical Exam   GENERAL: healthy, alert and no distress  RESP: lungs clear to auscultation - no rales, rhonchi or wheezes  CV: regular rate and rhythm, normal S1 S2, no S3 or S4, no murmur, click or rub, no peripheral edema and peripheral pulses strong  ABDOMEN: soft, nontender, no hepatosplenomegaly, no masses and bowel sounds normal  MS: no gross musculoskeletal defects noted, no edema  NEURO: Normal strength and tone, mentation intact and speech normal    Diagnostic Test Results:  Labs reviewed in Epic  No results found for any visits on 02/13/20.        Assessment & Plan       ICD-10-CM    1. Right wrist pain M25.531 traMADol (ULTRAM) 50 MG tablet     traMADol (ULTRAM) 50 MG tablet     traMADol (ULTRAM) 50 MG tablet   2. Chronic pain syndrome G89.4 traMADol (ULTRAM) 50 MG tablet   3. Migraine without aura and without status migrainosus, not intractable G43.009 SUMAtriptan (IMITREX) 50 MG tablet          Return in " about 6 months (around 8/13/2020) for Follow-up visit.    Kendall Garza MD  [facility]

## 2020-02-17 DIAGNOSIS — G43.009 MIGRAINE WITHOUT AURA AND WITHOUT STATUS MIGRAINOSUS, NOT INTRACTABLE: ICD-10-CM

## 2020-02-17 RX ORDER — TRAMADOL HYDROCHLORIDE 50 MG/1
TABLET ORAL
Qty: 45 TABLET | Refills: 0 | Status: SHIPPED | OUTPATIENT
Start: 2020-03-14 | End: 2020-04-14

## 2020-02-17 RX ORDER — TRAMADOL HYDROCHLORIDE 50 MG/1
50-100 TABLET ORAL DAILY PRN
Qty: 45 TABLET | Refills: 0 | Status: SHIPPED | OUTPATIENT
Start: 2020-02-17 | End: 2020-03-18

## 2020-02-17 RX ORDER — TRAMADOL HYDROCHLORIDE 50 MG/1
TABLET ORAL
Qty: 45 TABLET | Refills: 0 | Status: SHIPPED | OUTPATIENT
Start: 2020-04-14 | End: 2020-04-09

## 2020-02-17 RX ORDER — SUMATRIPTAN 50 MG/1
50 TABLET, FILM COATED ORAL
Qty: 12 TABLET | Refills: 1 | Status: SHIPPED | OUTPATIENT
Start: 2020-02-17 | End: 2020-05-08

## 2020-04-09 ENCOUNTER — MYC MEDICAL ADVICE (OUTPATIENT)
Dept: FAMILY MEDICINE | Facility: CLINIC | Age: 45
End: 2020-04-09

## 2020-04-09 DIAGNOSIS — M25.531 RIGHT WRIST PAIN: ICD-10-CM

## 2020-04-09 RX ORDER — TRAMADOL HYDROCHLORIDE 50 MG/1
TABLET ORAL
Qty: 45 TABLET | Refills: 0 | Status: SHIPPED | OUTPATIENT
Start: 2020-04-16 | End: 2020-05-08

## 2020-04-09 NOTE — TELEPHONE ENCOUNTER
Flory,    Patient requesting Tramad[facility] for [facility]    [facility] to [facility]    [facility] [address].    Thanks,  Devika Canseco RN

## 2020-05-08 ENCOUNTER — MYC REFILL (OUTPATIENT)
Dept: FAMILY MEDICINE | Facility: CLINIC | Age: 45
End: 2020-05-08

## 2020-05-08 DIAGNOSIS — G43.009 MIGRAINE WITHOUT AURA AND WITHOUT STATUS MIGRAINOSUS, NOT INTRACTABLE: ICD-10-CM

## 2020-05-08 DIAGNOSIS — M25.531 RIGHT WRIST PAIN: ICD-10-CM

## 2020-05-11 RX ORDER — TRAMADOL HYDROCHLORIDE 50 MG/1
TABLET ORAL
Qty: 45 TABLET | Refills: 0 | Status: SHIPPED | OUTPATIENT
Start: 2020-05-11 | End: 2020-06-03

## 2020-05-11 RX ORDER — SUMATRIPTAN 50 MG/1
50 TABLET, FILM COATED ORAL
Qty: 12 TABLET | Refills: 1 | Status: SHIPPED | OUTPATIENT
Start: 2020-05-11 | End: 2020-07-29

## 2020-05-11 NOTE — TELEPHONE ENCOUNTER
JF,  Please auth in FS's absence if appropriate.  Routing refill request to provider for review/approval because:  Pt using  > the 8/month.  Please authorize if appropriate.  Thanks,  Jen Retana RN

## 2020-05-11 NOTE — TELEPHONE ENCOUNTER
SN,  Please authorize if appropriate in FS's absence.  Thanks,  Jen Retana RN      Routing refill request to provider for review/approval because:  Drug not on the [facility] refill protocol   Please authorize if appropriate.  Thanks,  Jen Retana RN    Return in about 6 months (around 8/13/2020) for Follow-up visit.           Controlled Substance Refill Request for tramadol    Last refill: 4/9/20, prior one 3/16/20    Last clinic visit: 2/13/20     Clinic visit frequency required:  Face to Face visit q 6 months, virtual visit 3 months in between    Next appt: none scheduled    Controlled substance agreement on file: Yes:  Date 8/28/18.    Documentation in problem list reviewed:  Yes    Processing:  Rx to be electronically transmitted to pharmacy by provider     [place] reviewed:  reviewed- recommend provider review  [place]  [email or web address]  [email or web address]

## 2020-06-03 ENCOUNTER — MYC REFILL (OUTPATIENT)
Dept: FAMILY MEDICINE | Facility: CLINIC | Age: 45
End: 2020-06-03

## 2020-06-03 DIAGNOSIS — M25.531 RIGHT WRIST PAIN: ICD-10-CM

## 2020-06-03 NOTE — TELEPHONE ENCOUNTER
Pt due for virtual visit. Sent EventWith message reminder to schedule. Will wait for patient response.      Controlled Substance Refill Request for Tramadol    Last refill: 5/11/20 #45    Last clinic visit: 2/13/20     Clinic visit frequency required: Q 6  Months, Q 3 months virtual  Next appt:     Controlled substance agreement on file: Yes:  Date 8/28/18.    Documentation in problem list reviewed:  Yes    Processing:  Rx to be electronically transmitted to pharmacy by provider     [place] reviewed:  reviewed- no concerns  [place]:  [email or web address]

## 2020-06-04 ENCOUNTER — VIRTUAL VISIT (OUTPATIENT)
Dept: FAMILY MEDICINE | Facility: CLINIC | Age: 45
End: 2020-06-04
Payer: COMMERCIAL

## 2020-06-04 DIAGNOSIS — M25.531 RIGHT WRIST PAIN: ICD-10-CM

## 2020-06-04 PROCEDURE — 99213 OFFICE O/P EST LOW 20 MIN: CPT | Mod: 95 | Performed by: FAMILY MEDICINE

## 2020-06-04 RX ORDER — TRAMADOL HYDROCHLORIDE 50 MG/1
TABLET ORAL
Qty: 45 TABLET | Refills: 0 | Status: SHIPPED | OUTPATIENT
Start: 2020-06-04 | End: 2020-06-30
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
## 2020-07-29 ENCOUNTER — MYC REFILL (OUTPATIENT)
Dept: FAMILY MEDICINE | Facility: CLINIC | Age: 45
End: 2020-07-29

## 2020-07-29 DIAGNOSIS — G43.009 MIGRAINE WITHOUT AURA AND WITHOUT STATUS MIGRAINOSUS, NOT INTRACTABLE: ICD-10-CM

## 2020-07-30 RX ORDER — SUMATRIPTAN 50 MG/1
50 TABLET, FILM COATED ORAL
Qty: 12 TABLET | Refills: 1 | Status: SHIPPED | OUTPATIENT
Start: 2020-07-30 | End: 2020-08-31

## 2020-08-26 ENCOUNTER — MYC REFILL (OUTPATIENT)
Dept: FAMILY MEDICINE | Facility: CLINIC | Age: 45
End: 2020-08-26

## 2020-08-26 DIAGNOSIS — M25.531 RIGHT WRIST PAIN: ICD-10-CM

## 2020-08-26 RX ORDER — TRAMADOL HYDROCHLORIDE 50 MG/1
TABLET ORAL
Qty: 45 TABLET | Refills: 0 | Status: CANCELLED | OUTPATIENT
Start: 2020-08-26

## 2020-08-26 NOTE — TELEPHONE ENCOUNTER
Due for 6 month face to face visit - last 2/13/2020  Virtual visit 3 months in between - last 6/4/2020    : Last refill 7/30/2020 - #45

## 2020-08-28 ENCOUNTER — HOSPITAL ENCOUNTER (OUTPATIENT)
Dept: MAMMOGRAPHY | Facility: CLINIC | Age: 45
Discharge: HOME | End: 2020-08-28
Attending: FAMILY MEDICINE | Admitting: FAMILY MEDICINE
Payer: COMMERCIAL

## 2020-08-28 DIAGNOSIS — Z12.31 ENCOUNTER FOR SCREENING MAMMOGRAM FOR BREAST CANCER: ICD-10-CM

## 2020-08-28 PROCEDURE — 77063 BREAST TOMOSYNTHESIS BI: CPT

## 2020-08-31 ENCOUNTER — OFFICE VISIT (OUTPATIENT)
Dept: FAMILY MEDICINE | Facility: CLINIC | Age: 45
End: 2020-08-31
Payer: COMMERCIAL

## 2020-08-31 VITALS
BODY MASS INDEX: 22.81 KG/M2 | RESPIRATION RATE: 16 BRPM | DIASTOLIC BLOOD PRESSURE: 73 MMHG | WEIGHT: 154 LBS | HEIGHT: 69 IN | HEART RATE: 63 BPM | SYSTOLIC BLOOD PRESSURE: 113 MMHG | TEMPERATURE: 98 F | OXYGEN SATURATION: 97 %

## 2020-08-31 DIAGNOSIS — J01.80 OTHER ACUTE SINUSITIS, RECURRENCE NOT SPECIFIED: Primary | ICD-10-CM

## 2020-08-31 DIAGNOSIS — Z20.822 EXPOSURE TO COVID-19 VIRUS: ICD-10-CM

## 2020-08-31 DIAGNOSIS — M25.531 RIGHT WRIST PAIN: ICD-10-CM

## 2020-08-31 DIAGNOSIS — G43.009 MIGRAINE WITHOUT AURA AND WITHOUT STATUS MIGRAINOSUS, NOT INTRACTABLE: ICD-10-CM

## 2020-08-31 DIAGNOSIS — Z13.6 CARDIOVASCULAR SCREENING; LDL GOAL LESS THAN 160: ICD-10-CM

## 2020-08-31 LAB
AMPHETAMINES UR QL: NOT DETECTED NG/ML
BARBITURATES UR QL SCN: NOT DETECTED NG/ML
BENZODIAZ UR QL SCN: NOT DETECTED NG/ML
BUPRENORPHINE UR QL: NOT DETECTED NG/ML
CANNABINOIDS UR QL: NOT DETECTED NG/ML
COCAINE UR QL SCN: NOT DETECTED NG/ML
D-METHAMPHET UR QL: NOT DETECTED NG/ML
METHADONE UR QL SCN: NOT DETECTED NG/ML
OPIATES UR QL SCN: NOT DETECTED NG/ML
OXYCODONE UR QL SCN: NOT DETECTED NG/ML
PCP UR QL SCN: NOT DETECTED NG/ML
PROPOXYPH UR QL: NOT DETECTED NG/ML
TRICYCLICS UR QL SCN: NOT DETECTED NG/ML

## 2020-08-31 PROCEDURE — 80306 DRUG TEST PRSMV INSTRMNT: CPT | Performed by: FAMILY MEDICINE

## 2020-08-31 PROCEDURE — 99214 OFFICE O/P EST MOD 30 MIN: CPT | Performed by: FAMILY MEDICINE

## 2020-08-31 RX ORDER — TRAMADOL HYDROCHLORIDE 50 MG/1
TABLET ORAL
Qty: 45 TABLET | Refills: 0 | Status: SHIPPED | OUTPATIENT
Start: 2020-08-31 | End: 2020-09-28

## 2020-08-31 RX ORDER — SUMATRIPTAN 50 MG/1
50 TABLET, FILM COATED ORAL
Qty: 12 TABLET | Refills: 1 | Status: SHIPPED | OUTPATIENT
Start: 2020-08-31 | End: 2021-11-19

## 2020-08-31 RX ORDER — FLUTICASONE PROPIONATE 50 MCG
1 SPRAY, SUSPENSION (ML) NASAL DAILY
Qty: 9.9 ML | Refills: 3 | Status: SHIPPED | OUTPATIENT
Start: 2020-08-31 | End: 2021-06-14

## 2020-08-31 ASSESSMENT — MIFFLIN-ST. JEOR: SCORE: 1394.98

## 2020-08-31 NOTE — PATIENT INSTRUCTIONS
COVID-19  testing numbers     For surgical procedures pre-testing call:     [phone]     For symptoms patients call:    [phone] and press 7  [phone] and press 2    Please make sure you know your car make and model

## 2020-08-31 NOTE — LETTER
[facility]  08/31/20    Patient: Renetta Lauren  YOB: 1975  Medical Record Number: [id number]                                                                  Opioid / Opioid Plus Controlled Substance Agreement    I understand that my care provider has prescribed an opioid (narcotic) controlled substance to help manage my condition(s). I am taking this medicine to help me function or work. I know this is strong medicine, and that it can cause serious side effects. Opioid medicine can be sedating, addicting and may cause a dependency on the drug. They can affect my ability to drive or think, and cause depression. They need to be taken exactly as prescribed. Combining opioids with certain medicines or chemicals (such as cocaine, sedatives and tranquilizers, sleeping pills, meth) can be dangerous or even fatal. Also, if I stop opioids suddenly, I may have severe withdrawal symptoms. Last, I understand that opioids do not work for all types of pain nor for all patients. If not helpful, I may be asked to stop them.    The risks, benefits, and side effects of these medicine(s) were explained to me. I agree that:    1. I will take part in other treatments as advised by my care team. This may be psychiatry or counseling, physical therapy, behavioral therapy, group treatment or a referral to a pain clinic. I will reduce or stop my medicine when my care team tells me to do so.  2. I will take my medicines as prescribed. I will not change the dose or schedule unless my care team tells me to. There will be no refills if I  run out early.   I may be contactedwithout warning and asked to complete a urine drug test or pill count at any time.   3. I will keep all my appointments, and understand this is part of the monitoring of opioids. My care team may require an office visit for EVERY opioid/controlled substance refill. If I miss appointments or don t follow instructions, my care team may stop my  medicine.  4. I will not ask other providers to prescribe controlled substances, and I will not accept controlled substances from other people. If I need another prescribed controlled substance for a new reason, I will tell my care team within 1 business day.  5. I will use one pharmacy to fill all of my controlled substance prescriptions, and it is up to me to make sure that I do not run out of my medicines on weekends or holidays. If my care team is willing to refill my opioid prescription without a visit, I must request refills only during office hours, refills may take up to 3 days to process, and it may take up to 5 to 7 days for my medicine to be mailed and ready at my pharmacy. Prescriptions will not be mailed anywhere except my pharmacy.        [id number]  Rev 12/18         Registration to scan to EHR                             Page 1 of 2               Controlled Substance Agreement Opioid        [facility]  08/31/20  Patient: Renetta Lauren  YOB: 1975  Medical Record Number: [id number]                                                                  6. I am responsible for my prescriptions. If the medicine/prescription is lost or stolen, it will not be replaced. I also agree not to share controlled substance medicines with anyone.  7. I agree to not use ANY illegal or recreational drugs. This includes marijuana, cocaine, bath salts or other drugs. I agree not to use alcohol unless my care team says I may.          I agree to give urine samples whenever asked. If I don t give a urine sample, the care team may stop my medicine.    8. If I enroll in the [place] Medical Marijuana program, I will tell my care team. I will also sign an agreement to share my medical records with my care team.   9. I will bring in my list of medicines (or my medicine bottles) each time I come to the clinic.   10. I will tell my care team right away if I become pregnant or have a new medical problem treated  outside of my regular clinic.  11. I understand that this medicine can affect my thinking and judgment. It may be unsafe for me to drive, use machinery and do dangerous tasks. I will not do any of these things until I know how the medicine affects me. If my dose changes, I will wait to see how it affects me. I will contact my care team if I have concerns about medicine side effects.    I understand that if I do not follow any of the conditions above, my prescriptions or treatment may be stopped.      I agree that my provider, clinic care team, and pharmacy may work with any city, state or federal law enforcement agency that investigates the misuse, sale, or other diversion of my controlled medicine. I will allow my provider to discuss my care with or share a copy of this agreement with any other treating provider, pharmacy or emergency room where I receive care. I agree to give up (waive) any right of privacy or confidentiality with respect to these consents.     I have read this agreement and have asked questions about anything I did not understand.      ________________________________________________________________________  Patient signature - Date/Time -  Renetta Lauren                                      ________________________________________________________________________  Witness signature                                                            ________________________________________________________________________  Provider signature - Kendall Garza MD      [id number]  Rev 12/18         Registration to scan to EHR                         Page 2 of 2                   Controlled Substance Agreement Opioid           Page 1 of 2  Opioid Pain Medicines (also known as Narcotics)  What You Need to Know    What are opioids?   Opioids are pain medicines that must be prescribed by a doctor.  They are also known as narcotics.    Examples are:     morphine (MS Contin, Patti)    oxycodone  (Oxycontin)    oxycodone and acetaminophen (Percocet)    hydrocodone and acetaminophen (Vicodin, Norco)     fentanyl patch (Duragesic)     hydromorphone (Dilaudid)     methadone     What do opioids do well?   Opioids are best for short-term pain after a surgery or injury. They also work well for cancer pain. Unlike other pain medicines, they do not cause liver or kidney failure or ulcers. They may help some people with long-lasting (chronic) pain.     What do opioids NOT do well?   Opioids never get rid of pain entirely, and they do not work well for most patients with chronic pain. Opioids do not reduce swelling, one of the causes of pain. They also don t work well for nerve pain.                           For informational purposes only.  Not to replace the advice of your care provider.  Copyright [address] [facility]. All right reserved. [employer] 417248-Tkm 02/18.      Page 2 of 2    Risks and side effects   Talk to your doctor before you start or decide to keep taking one of these medicines. Side effects include:    Lowering your breathing rate enough to cause death    Overdose, including death, especially if taking higher than prescribed doses    Long-term opioid use    Worse depression symptoms; less pleasure in things you usually enjoy    Feeling tired or sluggish    Slower thoughts or cloudy thinking    Being more sensitive to pain over time; pain is harder to control    Trouble sleeping or restless sleep    Changes in hormone levels (for example, less testosterone)    Changes in sex drive or ability to have sex    Constipation    Unsafe driving    Itching and sweating    Feeling dizzy    Nausea, vomiting and dry mouth    What else should I know about opioids?  When someone takes opioids for too long or too often, they become dependent. This means that if you stop or reduce the medicine too quickly, you will have withdrawal symptoms.    Dependence is not the same as addiction. Addiction is when  people keep using a substance that harms their body, their mind or their relations with others. If you have a history of drug or alcohol abuse, taking opioids can cause a relapse.    Over time, opioids don t work as well. Most people will need higher and higher doses. The higher the dose, the more serious the side effects. We don t know the long-term effects of opioids.      Prescribed opioids aren't the best way to manage chronic pain    Other ways to manage pain include:      Ibuprofen or acetaminophen.  You should always try this first.      Treat health problems that may be causing pain.      acupuncture or massage, deep breathing, meditation, visual imagery, aromatherapy.      Use heat or ice at the pain site      Physical therapy and exercise      Stop smoking      See a counselor or therapist                                                  People who have used opioids for a long time may have a lower quality of life, worse depression, higher levels of pain and more visits to doctors.    Never share your opioids with others. Be sure to store opioids in a secure place, locked if possible.Young children can easily swallow them and overdose.     You can overdose on opioids.  Signs of overdose include decrease or loss of consciousness, slowed breathing, trouble waking and blue lips.  If someone is worried about overdose, they should call 911.    If you are at risk for overdose, you may get naloxone (Narcan, a medicine that reverses the effects of opioids.  If you overdose, a friend or family member can give you Narcan while waiting for the ambulance.  They need to know the signs of overdose and how to give Narcan.    While you're taking opioids:    Don't use alcohol or street drugs. Taking them together can cause death.    Don't take any of these medicines unless your doctor says its okay.  Taking these with opioids can cause death.    Benzodiazepines (such as lorazepam         or diazepam)    Muscle relaxers  (such as cyclobenzaprine)    sleeping pills    other opioids    Safe disposal of opioids  Find your area drug take-back program, your pharmacy mail-back program, buy a special disposal bag (such as Deterra) from your pharmacy or flush them down the toilet.  Use the guidelines at:  www.fda.gov/drugs/resourcesforyou

## 2020-09-02 DIAGNOSIS — Z20.822 EXPOSURE TO COVID-19 VIRUS: ICD-10-CM

## 2020-09-04 LAB
SARS-COV-2 RNA SPEC QL NAA+PROBE: NOT DETECTED
SPECIMEN SOURCE: NORMAL

## 2020-09-28 ENCOUNTER — MYC REFILL (OUTPATIENT)
Dept: FAMILY MEDICINE | Facility: CLINIC | Age: 45
End: 2020-09-28

## 2020-09-28 DIAGNOSIS — M25.531 RIGHT WRIST PAIN: ICD-10-CM

## 2020-09-28 DIAGNOSIS — G89.4 CHRONIC PAIN SYNDROME: ICD-10-CM

## 2020-09-29 NOTE — TELEPHONE ENCOUNTER
FS,    Controlled Substance Refill Request for Tramadol    Last refill: 8/31/2020 - #45    Last clinic visit: 8/31/2020    Clinic visit frequency required: Face to face Q 6 months, virtual 3 months in between    Next appt: None    Controlled substance agreement on file: Yes:  Date 5/7/2018.    Documentation in problem list reviewed:  Yes    Processing:  Rx to be electronically transmitted to pharmacy by provider     [place]: Overdue Risk score = 400 (range 000 - 999).    Thanks,  Devika Canseco RN    [place]:  [email or web address]

## 2020-09-30 RX ORDER — TRAMADOL HYDROCHLORIDE 50 MG/1
TABLET ORAL
Qty: 45 TABLET | Refills: 0 | Status: SHIPPED | OUTPATIENT
Start: 2020-09-30 | End: 2020-10-28

## 2020-11-27 ENCOUNTER — VIRTUAL VISIT (OUTPATIENT)
Dept: FAMILY MEDICINE | Facility: CLINIC | Age: 45
End: 2020-11-27
Payer: COMMERCIAL

## 2020-11-27 DIAGNOSIS — M25.531 RIGHT WRIST PAIN: ICD-10-CM

## 2020-11-27 PROCEDURE — 99214 OFFICE O/P EST MOD 30 MIN: CPT | Mod: 95 | Performed by: FAMILY MEDICINE

## 2020-11-27 RX ORDER — TRAMADOL HYDROCHLORIDE 50 MG/1
TABLET ORAL
Qty: 45 TABLET | Refills: 0 | Status: SHIPPED | OUTPATIENT
Start: 2020-11-28 | End: 2020-12-28

## 2020-11-27 NOTE — PROGRESS NOTES
"Renetta Lauren is a 45 year old female who is being evaluated via a billable video visit.      The patient has been notified of following:     \"This video visit will be conducted via a call between you and your physician/provider. We have found that certain health care needs can be provided without the need for an in-person physical exam.  This service lets us provide the care you need with a video conversation.  If a prescription is necessary we can send it directly to your pharmacy.  If lab work is needed we can place an order for that and you can then stop by our lab to have the test done at a later time.    Video visits are billed at different rates depending on your insurance coverage.  Please reach out to your insurance provider with any questions.    If during the course of the call the physician/provider feels a video visit is not appropriate, you will not be charged for this service.\"    Patient has given verbal consent for Video visit? Yes  How would you like to obtain your AVS? MyChart  If you are dropped from the video visit, the video invite should be resent to: Text to cell phone: [phone]  Will anyone else be joining your video visit? No    Subjective     Renetta Lauren is a 45 year old female who presents today via video visit for the following health issues:    HPI     Chronic Pain Follow-Up    Where in your body do you have pain? Shoulder, and arms/wrist and hands  How has your pain affected your ability to work? Work   Which of these pain treatments have you tried since your last clinic visit? Physical Therapy, yoga   How well are you sleeping? Good  How has your mood been since your last visit? About the same  Have you had a significant life event? Other: covid 19  Other aggravating factors: repetitive activities - working from home and computer  Taking medication as directed? Yes    PHQ-9 SCORE 8/14/2019 2/13/2020 7/30/2020   PHQ-9 Total Score MyChart - - 0   PHQ-9 Total Score 0 1 0 "     ANDREW-7 SCORE 6/1/2018 8/8/2018 8/14/2019   Total Score - - -   Total Score 0 (minimal anxiety) 1 (minimal anxiety) -   Total Score 0 1 1     No flowsheet data found.  Encounter-Level CSA - 05/07/2018:    Controlled Substance Agreement - Scan on 8/28/2018  3:02 PM: CONTROLLED SUBSTANCE AGREEMENT     Encounter-Level CSA - 12/15/2016:    Controlled Substance Agreement - Scan on 12/19/2016  1:11 PM: CONTROLLED SUBSTANCE AGREEMENT     Patient-Level CSA:    Controlled Substance Agreement - Opioid - Scan on 8/31/2020  1:23 PM  Controlled Substance Agreement - Opioid - Scan on 3/8/2019  4:47 PM         How many servings of fruits and vegetables do you eat daily?  4 or more    On average, how many sweetened beverages do you drink each day (Examples: soda, juice, sweet tea, etc.  Do NOT count diet or artificially sweetened beverages)?   1 orange juice    How many days per week do you exercise enough to make your heart beat faster? 6    How many minutes a day do you exercise enough to make your heart beat faster? 30 - 60  How many days per week do you miss taking your medication? 2    What makes it hard for you to take your medications?  so patient doesnt get used to med    Video Start Time: 11:02 PM    Review of Systems   Constitutional, HEENT, cardiovascular, pulmonary, gi and gu systems are negative, except as otherwise noted.      Objective           Vitals:  No vitals were obtained today due to virtual visit.    Physical Exam     GENERAL: Healthy, alert and no distress  EYES: Eyes grossly normal to inspection.  No discharge or erythema, or obvious scleral/conjunctival abnormalities.  RESP: No audible wheeze, cough, or visible cyanosis.  No visible retractions or increased work of breathing.    SKIN: Visible skin clear. No significant rash, abnormal pigmentation or lesions.  NEURO: Cranial nerves grossly intact.  Mentation and speech appropriate for age.  PSYCH: Mentation appears normal, affect normal/bright, judgement  and insight intact, normal speech and appearance well-groomed.      No results found for any visits on 11/27/20.        Assessment & Plan     Right wrist pain  - traMADol (ULTRAM) 50 MG tablet; 1-2 tablets daily as needed for pain    medication refilled today.     Return in about 3 months (around 2/27/2021).    Kendall Garza MD  [facility]      Video-Visit Details    Type of service:  Video Visit    Video End Time:11:13 PM    Originating Location (pt. Location): Home    Distant Location (provider location):  [facility]     Platform used for Video Visit: StephaniWell

## 2020-12-28 ENCOUNTER — MYC REFILL (OUTPATIENT)
Dept: FAMILY MEDICINE | Facility: CLINIC | Age: 45
End: 2020-12-28

## 2020-12-28 DIAGNOSIS — G89.4 CHRONIC PAIN SYNDROME: ICD-10-CM

## 2020-12-28 DIAGNOSIS — M25.531 RIGHT WRIST PAIN: ICD-10-CM

## 2020-12-30 RX ORDER — TRAMADOL HYDROCHLORIDE 50 MG/1
TABLET ORAL
Qty: 45 TABLET | Refills: 0 | Status: SHIPPED | OUTPATIENT
Start: 2020-12-30 | End: 2021-01-26

## 2020-12-30 NOTE — TELEPHONE ENCOUNTER
FS  Controlled Substance Refill Request for Tramadol    Last refill: 11/27/20 #45    Last clinic visit: 11/27/20     Clinic visit frequency required: Q 6  Months face to face, Q 3 months - virtual  Next appt:     Controlled substance agreement on file: Yes:  Date 8/28/18.    Documentation in problem list reviewed:  Yes    Processing:  Rx to be electronically transmitted to pharmacy by provider     [place] reviewed:  reviewed- no concerns  [place]:  [email or web address]    Please approve if appropriate  Maryellen Cronin RN      
(4) rarely moist

## 2021-01-14 ENCOUNTER — HEALTH MAINTENANCE LETTER (OUTPATIENT)
Age: 46
End: 2021-01-14

## 2021-01-26 ENCOUNTER — MYC REFILL (OUTPATIENT)
Dept: FAMILY MEDICINE | Facility: CLINIC | Age: 46
End: 2021-01-26

## 2021-01-26 DIAGNOSIS — M25.531 RIGHT WRIST PAIN: ICD-10-CM

## 2021-01-26 NOTE — TELEPHONE ENCOUNTER
Routing refill request to provider for review/approval because:  Drug not on the [facility] refill protocol   [place] not working  Adrianne WAGONER RN    Last Written Prescription Date:  12/30/2020  Last Fill Quantity: 45,  # refills: 0   Last office visit: 8/31/2020 with prescribing provider:     Future Office Visit:

## 2021-01-27 RX ORDER — TRAMADOL HYDROCHLORIDE 50 MG/1
TABLET ORAL
Qty: 45 TABLET | Refills: 0 | Status: SHIPPED | OUTPATIENT
Start: 2021-01-27 | End: 2021-02-25

## 2021-02-22 DIAGNOSIS — M25.531 RIGHT WRIST PAIN: ICD-10-CM

## 2021-02-22 RX ORDER — TRAMADOL HYDROCHLORIDE 50 MG/1
TABLET ORAL
Qty: 45 TABLET | Refills: 0 | Status: CANCELLED | OUTPATIENT
Start: 2021-02-22

## 2021-02-22 NOTE — TELEPHONE ENCOUNTER
Routing refill request to provider for review/approval because:  Drug not on the [facility] refill protocol   [place] not working  Adrianne WAGONER RN    Last Written Prescription Date:  1/27/2021  Last Fill Quantity: 45,  # refills: 0   Last office visit: 8/31/2020 with prescribing provider:     Future Office Visit:   Next 5 appointments (look out 90 days)    Feb 25, 2021 11:00 AM  MyChart Physical Adult with Kendall Garza MD  [facility] ([facility] ) [address] [place]  [facility] [id number] [phone]

## 2021-02-25 ENCOUNTER — OFFICE VISIT (OUTPATIENT)
Dept: FAMILY MEDICINE | Facility: CLINIC | Age: 46
End: 2021-02-25
Payer: COMMERCIAL

## 2021-02-25 VITALS
SYSTOLIC BLOOD PRESSURE: 128 MMHG | DIASTOLIC BLOOD PRESSURE: 89 MMHG | OXYGEN SATURATION: 97 % | RESPIRATION RATE: 20 BRPM | HEART RATE: 55 BPM | BODY MASS INDEX: 22.99 KG/M2 | WEIGHT: 155.2 LBS | TEMPERATURE: 97.7 F | HEIGHT: 69 IN

## 2021-02-25 DIAGNOSIS — Z12.4 SCREENING FOR MALIGNANT NEOPLASM OF CERVIX: ICD-10-CM

## 2021-02-25 DIAGNOSIS — F11.90 CHRONIC, CONTINUOUS USE OF OPIOIDS: ICD-10-CM

## 2021-02-25 DIAGNOSIS — G89.4 CHRONIC PAIN SYNDROME: ICD-10-CM

## 2021-02-25 DIAGNOSIS — Z00.00 ROUTINE GENERAL MEDICAL EXAMINATION AT A HEALTH CARE FACILITY: Primary | ICD-10-CM

## 2021-02-25 DIAGNOSIS — M25.531 RIGHT WRIST PAIN: ICD-10-CM

## 2021-02-25 DIAGNOSIS — L70.9 ACNE, UNSPECIFIED ACNE TYPE: ICD-10-CM

## 2021-02-25 PROCEDURE — 87624 HPV HI-RISK TYP POOLED RSLT: CPT | Performed by: FAMILY MEDICINE

## 2021-02-25 PROCEDURE — 99396 PREV VISIT EST AGE 40-64: CPT | Performed by: FAMILY MEDICINE

## 2021-02-25 PROCEDURE — G0145 SCR C/V CYTO,THINLAYER,RESCR: HCPCS | Performed by: FAMILY MEDICINE

## 2021-02-25 RX ORDER — TRAMADOL HYDROCHLORIDE 50 MG/1
TABLET ORAL
Qty: 40 TABLET | Refills: 3 | Status: SHIPPED | OUTPATIENT
Start: 2021-02-25 | End: 2021-06-14

## 2021-02-25 ASSESSMENT — MIFFLIN-ST. JEOR: SCORE: 1408.36

## 2021-02-25 NOTE — PROGRESS NOTES
SUBJECTIVE:   CC: Renetta Lauren is an 45 year old woman who presents for preventive health visit.     Patient has been advised of split billing requirements and indicates understanding: Yes  Healthy Habits:     Getting at least 3 servings of Calcium per day:  NO    Bi-annual eye exam:  NO    Dental care twice a year:  Yes    Sleep apnea or symptoms of sleep apnea:  None    Diet:  Regular (no restrictions) and Other    Frequency of exercise:  6-7 days/week    Duration of exercise:  30-45 minutes    Taking medications regularly:  Yes    Medication side effects:  None    PHQ-2 Total Score: 0    Additional concerns today:  No    Pt requesting referral for derm. Mother was diagnosed with melanoma.     Today's PHQ-2 Score:   PHQ-2 ( 1999 Pf[employer]) 2/24/2021   Q1: Little interest or pleasure in doing things 0   Q2: Feeling down, depressed or hopeless 0   PHQ-2 Score 0   Q1: Little interest or pleasure in doing things Not at all   Q2: Feeling down, depressed or hopeless Not at all   PHQ-2 Score 0       Abuse: Current or Past (Physical, Sexual or Emotional) - No  Do you feel safe in your environment? Yes    Have you ever done Advance Care Planning? (For example, a Health Directive, POLST, or a discussion with a medical provider or your loved ones about your wishes): Pt does have advance care planning documents and will bring copies into clinic.     Social History     Tobacco Use     Smoking status: Never Smoker     Smokeless tobacco: Never Used   Substance Use Topics     Alcohol use: Yes     Alcohol/week: 0.0 standard drinks     Comment: 1-4 glasses of wine per week     If you drink alcohol do you typically have >3 drinks per day or >7 drinks per week? No    No flowsheet data found.      Reviewed orders with patient.  Reviewed health maintenance and updated orders accordingly - Yes    Breast CA Risk Screening:  No flowsheet data found.  No flowsheet data found.  click delete button to remove this line now  Mammogram  "Screening: Recommended annual mammography  Pertinent mammograms are reviewed under the imaging tab.    History of abnormal Pap smear:  PAP / HPV Latest Ref Rng & Units 8/19/2016 8/1/2011 1/4/2010   PAP - NIL NIL NIL   HPV 16 DNA NEG Negative - -   HPV 18 DNA NEG Negative - -   OTHER HR HPV NEG Negative - -     Reviewed and updated as needed this visit by clinical staff  Tobacco  Allergies  Meds   Med Hx  Surg Hx  Fam Hx  Soc Hx        Reviewed and updated as needed this visit by Provider                    Review of Systems  CONSTITUTIONAL: NEGATIVE for fever, chills, change in weight  INTEGUMENTARU/SKIN: NEGATIVE for worrisome rashes, moles or lesions  EYES: NEGATIVE for vision changes or irritation  ENT: NEGATIVE for ear, mouth and throat problems  RESP: NEGATIVE for significant cough or SOB  BREAST: NEGATIVE for masses, tenderness or discharge  CV: NEGATIVE for chest pain, palpitations or peripheral edema  GI: NEGATIVE for nausea, abdominal pain, heartburn, or change in bowel habits  : NEGATIVE for unusual urinary or vaginal symptoms. Periods are regular.  MUSCULOSKELETAL: NEGATIVE for significant arthralgias or myalgia  NEURO: NEGATIVE for weakness, dizziness or paresthesias  PSYCHIATRIC: NEGATIVE for changes in mood or affect     OBJECTIVE:   /89 (Patient Position: Sitting, Cuff Size: Adult Regular)   Pulse 55   Temp 97.7  F (36.5  C) (Tympanic)   Resp 20   Ht 1.753 m (5' 9\")   Wt 70.4 kg (155 lb 3.2 oz)   SpO2 97%   BMI 22.92 kg/m    Physical Exam  GENERAL: healthy, alert and no distress  EYES: Eyes grossly normal to inspection, PERRL and conjunctivae and sclerae normal  HENT: ear canals and TM's normal, nose and mouth without ulcers or lesions  NECK: no adenopathy, no asymmetry, masses, or scars and thyroid normal to palpation  RESP: lungs clear to auscultation - no rales, rhonchi or wheezes  BREAST: normal without masses, tenderness or nipple discharge and no palpable axillary masses or " "adenopathy  CV: regular rate and rhythm, normal S1 S2, no S3 or S4, no murmur, click or rub, no peripheral edema and peripheral pulses strong  ABDOMEN: soft, nontender, no hepatosplenomegaly, no masses and bowel sounds normal   (female): normal female external genitalia, normal urethral meatus, vaginal mucosa pink, moist, well rugated, and normal cervix/adnexa/uterus without masses or discharge  MS: no gross musculoskeletal defects noted, no edema  SKIN: no suspicious lesions or rashes  NEURO: Normal strength and tone, mentation intact and speech normal  PSYCH: mentation appears normal, affect normal/bright    Diagnostic Test Results:  Labs reviewed in Epic  No results found for any visits on 02/25/21.    ASSESSMENT/PLAN:       ICD-10-CM    1. Routine general medical examination at a health care facility  Z00.00 Lipid panel reflex to direct LDL Fasting     Hepatitis C antibody   2. Chronic pain syndrome  G89.4    3. Acne, unspecified acne type  L70.9 tazarotene (TAZORAC) 0.05 % external cream   4. Right wrist pain  M25.531 traMADol (ULTRAM) 50 MG tablet   5. Screening for malignant neoplasm of cervix  Z12.4 Pap imaged thin layer screen with HPV - recommended age 30 - 65 years (select HPV order below)     HPV High Risk Types DNA Cervical       Patient has been advised of split billing requirements and indicates understanding: Yes  COUNSELING:  Reviewed preventive health counseling, as reflected in patient instructions       Regular exercise       Healthy diet/nutrition    Estimated body mass index is 22.92 kg/m  as calculated from the following:    Height as of this encounter: 1.753 m (5' 9\").    Weight as of this encounter: 70.4 kg (155 lb 3.2 oz).    Counseling Resources:  ATP IV Guidelines  Pooled Cohorts Equation Calculator  Breast Cancer Risk Calculator  BRCA-Related Cancer Risk Assessment: FHS-7 Tool  FRAX Risk Assessment  ICSI Preventive Guidelines  Dietary Guidelines for Americans, 2010  USDA's MyPlate  ASA " Prophylaxis  Lung CA Screening    Kendall Garza MD  [facility]

## 2021-02-25 NOTE — LETTER
[facility]    02/25/21    Patient: Renetta Lauren  YOB: 1975  Medical Record Number: [id number]                                                                  Controlled Substance Agreement  I understand that my care provider has prescribed controlled substances (narcotics, tranquilizers, and/or stimulants) to help manage my condition(s).  I am taking this medicine to help me function or work.  I know that this is strong medicine.  It could have serious side effects and even cause a dependency on the drug.  If I stop these medicines suddenly, I could have severe withdrawal symptoms.    The risks, benefits, and side effects of these medication(s) were explained to me.  I agree that:  1. I will take part in other treatments as advised by my provider.  This may be psychiatry or counseling, physical therapy, behavioral therapy, group treatment, or a referral to a pain clinic.  I will reduce or stop my medicine when my provider tells me to do so.   2. I will take my medicines as prescribed.  I will not change the dose or schedule unless my provider tells me to.  There will be no refills if I  run out early.   I may be contacted at any time without warning and asked to complete a drug test or pill count.   3. I will keep all my appointments at the clinic.  If I miss appointments or fail to follow instructions, my provider may stop my medicine.  4. I will not ask other providers to prescribe controlled substances. And I will not accept controlled substances from other people. If I need another prescribed controlled substance for a new reason, I will notify my provider within one business day.  5. If I enroll in the [place] Medical Marijuana program, I will tell my provider.  I will also sign an agreement to share my medical records with my provider.  6. I will use one pharmacy to fill all of my controlled substance prescriptions.  If my prescription is mailed to my pharmacy, it may  take 5 to 7 days for my medicine to be ready.  7. I understand that my provider, clinic care team, and pharmacy can track controlled substance prescriptions from other providers through a central database (prescription monitoring program).  8. I will bring in my list of medications (or my medicine bottles) each time I come to the clinic.  [id number] REV-  07/2018                                                                                                                                    Page 1 of 2      [facility]    02/25/21    Patient: Renetta Lauren  YOB: 1975  Medical Record Number: [id number]    9. Refills of controlled substances will be made only during office hours.  It is up to me to make sure that I do not run out of my medicines on weekends or holidays.    10. I am responsible for my prescriptions.  If the medicine/prescription is lost or stolen, it will not be replaced.   I also agree not to share these medicines with anyone.  11. I agree to not use ANY illegal or recreational drugs.  This includes marijuana, cocaine, bath salts or other drugs.  I agree not to use alcohol unless my provider says I may.  I agree to give urine samples whenever asked.  If I fail to give a urine sample, the provider may stop my medicine.     12. I will tell my nurse or provider right away if I become pregnant or have a new medical problem treated outside of [facility].  13. I understand that this medicine can affect my thinking and judgment.  It may be unsafe for me to drive, use machinery and do dangerous tasks.  I will not do any of these things until I know how the medicine affects me.  If my dose changes, I will wait to see how it affects me.  I will contact my provider if I have concerns about medicine side effects.  I understand that if I do not follow any of the conditions above, my prescriptions or treatment may be stopped.    I agree that my provider, clinic care  team, and pharmacy may work with any city, state or federal law enforcement agency that investigates the misuse, sale, or other diversion of my controlled medicine. I will allow my provider to discuss my care with or share a copy of this agreement with any other treating provider, pharmacy or emergency room where I receive care.  I agree to give up (waive) any right of privacy or confidentiality with respect to these authorizations.   I have read this agreement and have asked questions about anything I did not understand.   ___________________________________    ___________________________  Patient Signature                                                           Date and Time  ___________________________________     ____________________________  Witness                                                                            Date and Time  ___________________________________  Kendall Garza MD  [id number] REV-  07/2018                                                                                                                                                   Page 2 of 2

## 2021-03-01 LAB
COPATH REPORT: NORMAL
PAP: NORMAL

## 2021-03-02 NOTE — PROGRESS NOTES
"Subjective     Renetta Lauren is a 45 year old female who presents to clinic today for the following health issues:    HPI       ALLERGIES  Onset/Duration: 07/2020  Symptoms:   Nasal congestion: YES  Sneezing: YES  Red, itchy eyes: YES  Progression of Symptoms: intermittent  Accompanying Signs & Symptoms:  Cough: YES- at night with post nasal drip  Wheezing: no  Rash: no  Sinus/facial pain: YES  History:   Is it seasonal: in the spring   History of Asthma: no  Has allergy testing been done: no  Precipitating factors:   None  Alleviating factors:  None  Therapies tried and outcome: none  Also, she has a history of chronic right wrist pain. Taking tramadol intermittently.     Also, she has been having intermittent dry cough. Initially symptoms were attributed to allergies but also desires to get tested for Covid-19.     Review of Systems   Constitutional, HEENT, cardiovascular, pulmonary, gi and gu systems are negative, except as otherwise noted.      Objective    /73   Pulse 63   Temp 98  F (36.7  C) (Oral)   Resp 16   Ht 1.74 m (5' 8.5\")   Wt 69.9 kg (154 lb)   SpO2 97%   BMI 23.08 kg/m    Body mass index is 23.08 kg/m .  Physical Exam   GENERAL: healthy, alert and no distress  RESP: lungs clear to auscultation - no rales, rhonchi or wheezes  CV: regular rate and rhythm, normal S1 S2, no S3 or S4, no murmur, click or rub, no peripheral edema and peripheral pulses strong  MS: no gross musculoskeletal defects noted, no edema    No results found for this or any previous visit (from the past 24 hour(s)).        Assessment & Plan     Other acute sinusitis, recurrence not specified  - fluticasone (FLONASE) 50 MCG/ACT nasal spray; Spray 1 spray into both nostrils daily  - Drug Abuse Screen Panel 13, Urine (Pain Care Package)  - Lipid panel reflex to direct LDL Fasting; Future    Right wrist pain  - traMADol (ULTRAM) 50 MG tablet; 1-2 tablets daily as needed for pain    Migraine without aura and without status " migrainosus, not intractable  - SUMAtriptan (IMITREX) 50 MG tablet; Take 1 tablet (50 mg) by mouth at onset of headache for migraine May repeat in 2 hours. Max 4 tablets/24 hours.  - FLUoxetine (PROZAC) 20 MG capsule; Take 1 capsule (20 mg) by mouth daily    Exposure to COVID-19 virus  - Asymptomatic COVID-19 Virus (Coronavirus) by PCR; Future    CARDIOVASCULAR SCREENING; LDL GOAL LESS THAN 160  - Lipid panel reflex to direct LDL Fasting; Future       See Patient Instructions    Return in about 6 months (around 2/28/2021) for Follow-up visit.    Kendall Garza MD  [facility]     no

## 2021-03-03 LAB
FINAL DIAGNOSIS: NORMAL
HPV HR 12 DNA CVX QL NAA+PROBE: NEGATIVE
HPV16 DNA SPEC QL NAA+PROBE: NEGATIVE
HPV18 DNA SPEC QL NAA+PROBE: NEGATIVE
SPECIMEN DESCRIPTION: NORMAL
SPECIMEN SOURCE CVX/VAG CYTO: NORMAL

## 2021-03-04 PROBLEM — R87.810 CERVICAL HIGH RISK HPV (HUMAN PAPILLOMAVIRUS) TEST POSITIVE: Status: ACTIVE | Noted: 2021-03-04

## 2021-06-10 NOTE — PROGRESS NOTES
Assessment & Plan     Chronic pain of both shoulders  Assessment: chronic persistent shoulder and wrist pain bilaterally. Patient is a free bienvenido writer and a professor. She spends hours typing or using the computer.   Plan:  - continue with current medications.   - consider seeing a rheumatologist if pain gets worse.     Chronic, continuous use of opioids  - traMADol (ULTRAM) 50 MG tablet; 1-2 tablets daily as needed for pain    Migraine without aura and without status migrainosus, not intractable  Patient has not had a migraine for over 2 months.   - FLUoxetine (PROZAC) 20 MG capsule; Take 1 capsule (20 mg) by mouth daily    Seasonal allergic rhinitis due to pollen  - fluticasone (FLONASE) 50 MCG/ACT nasal spray; Spray 1 spray into both nostrils daily    Right wrist pain  Chronic wrist pain. Currently stable to with medications.   - traMADol (ULTRAM) 50 MG tablet; 1-2 tablets daily as needed for pain    CARDIOVASCULAR SCREENING; LDL GOAL LESS THAN 160  - Lipid panel reflex to direct LDL Fasting    Return in about 3 months (around 9/14/2021) for Follow-up visit.    Kendall Garza MD  [facility]   Renetta is a 45 year old who presents for the following health issues    HPI     Allergies:  Triggers, pollen, outdoor activities, high ragweed and tree pollen,   Medication Followup of tramadol, fluoxetine, sumatriptan    Taking Medication as prescribed: yes    Side Effects:  None    Medication Helping Symptoms:  yes      Migraine     Since your last clinic visit, how have your headaches changed?  Improved    How often are you getting headaches or migraines? 0     Are you able to do normal daily activities when you have a migraine? Yes    Are you taking rescue/relief medications? (Select all that apply) sumatriptan (Imitrex)    How helpful is your rescue/relief medication?  I get total relief    Are you taking any medications to prevent migraines? (Select all that apply)  No    In  "the past 4 weeks, how often have you gone to urgent care or the emergency room because of your headaches?  0   Taking prozac every third day mainly for migraines. Has not had a migraine for more than 2 months.     Pain History:  When did you first notice your pain? - a few years  Have you seen this provider for your pain in the past?   Yes   Where in your body do you have pain? Right wrist and bilateral shoulders.   Are you seeing anyone else for your pain? No    PHQ-9 SCORE 7/30/2020 2/26/2021 6/14/2021   PHQ-9 Total Score MyChart 0 0 -   PHQ-9 Total Score 0 0 0       ANDREW-7 SCORE 2/26/2021 2/26/2021 6/14/2021   Total Score - - -   Total Score - 1 (minimal anxiety) -   Total Score 1 1 0     Chronic Pain Follow Up:    Location of pain: bilateral wrist pain. Worse in the evening. Back of the shoulders.   Analgesia/pain control:    - Recent changes:      - Overall control: Comfortably manageable    - Current treatments: Massage therapy and OTC medications   Adherence:     - Do you ever take more pain medicine than prescribed? Yes    - When did you take your last dose of pain medicine? Patient takes it about 7-8 pm.    Pain is on the scale of 4-5/10 in the evening.   Adverse effects: No   PDMP Review       Value Time User    State PDMP site checked  Yes 6/14/2021  9:11 AM Kendlal Garza MD        Last CSA Agreement:   Patient-Level CSA:    Controlled Substance Agreement - Opioid - Scan on 8/31/2020  1:23 PM  Controlled Substance Agreement - Opioid - Scan on 3/8/2019  4:47 PM       Last UDS: 8/31/2020  Review of Systems   Constitutional, HEENT, cardiovascular, pulmonary, gi and gu systems are negative, except as otherwise noted.      Objective    /80 (BP Location: Right arm, Cuff Size: Adult Regular)   Pulse 55   Temp 98.6  F (37  C) (Tympanic)   Resp 16   Ht 1.753 m (5' 9\")   Wt 68.9 kg (152 lb)   SpO2 99%   BMI 22.45 kg/m    Body mass index is 22.45 kg/m .  Physical Exam   GENERAL: " healthy, alert and no distress  RESP: lungs clear to auscultation - no rales, rhonchi or wheezes  CV: regular rate and rhythm, normal S1 S2, no S3 or S4, no murmur, click or rub, no peripheral edema and peripheral pulses strong  MS: no gross musculoskeletal defects noted, no edema    No results found for any visits on 06/14/21.

## 2021-06-14 ENCOUNTER — OFFICE VISIT (OUTPATIENT)
Dept: FAMILY MEDICINE | Facility: CLINIC | Age: 46
End: 2021-06-14
Payer: COMMERCIAL

## 2021-06-14 VITALS
OXYGEN SATURATION: 99 % | SYSTOLIC BLOOD PRESSURE: 127 MMHG | HEART RATE: 55 BPM | HEIGHT: 69 IN | RESPIRATION RATE: 16 BRPM | WEIGHT: 152 LBS | TEMPERATURE: 98.6 F | DIASTOLIC BLOOD PRESSURE: 80 MMHG | BODY MASS INDEX: 22.51 KG/M2

## 2021-06-14 DIAGNOSIS — M25.511 CHRONIC PAIN OF BOTH SHOULDERS: Primary | ICD-10-CM

## 2021-06-14 DIAGNOSIS — M25.512 CHRONIC PAIN OF BOTH SHOULDERS: Primary | ICD-10-CM

## 2021-06-14 DIAGNOSIS — Z13.6 CARDIOVASCULAR SCREENING; LDL GOAL LESS THAN 160: ICD-10-CM

## 2021-06-14 DIAGNOSIS — F11.90 CHRONIC, CONTINUOUS USE OF OPIOIDS: ICD-10-CM

## 2021-06-14 DIAGNOSIS — J30.1 SEASONAL ALLERGIC RHINITIS DUE TO POLLEN: ICD-10-CM

## 2021-06-14 DIAGNOSIS — G89.29 CHRONIC PAIN OF BOTH SHOULDERS: Primary | ICD-10-CM

## 2021-06-14 DIAGNOSIS — G43.009 MIGRAINE WITHOUT AURA AND WITHOUT STATUS MIGRAINOSUS, NOT INTRACTABLE: ICD-10-CM

## 2021-06-14 DIAGNOSIS — M25.531 RIGHT WRIST PAIN: ICD-10-CM

## 2021-06-14 LAB — HCV AB SERPL QL IA: NONREACTIVE

## 2021-06-14 PROCEDURE — 99214 OFFICE O/P EST MOD 30 MIN: CPT | Performed by: FAMILY MEDICINE

## 2021-06-14 PROCEDURE — 86803 HEPATITIS C AB TEST: CPT | Performed by: FAMILY MEDICINE

## 2021-06-14 PROCEDURE — 36415 COLL VENOUS BLD VENIPUNCTURE: CPT | Performed by: FAMILY MEDICINE

## 2021-06-14 PROCEDURE — 80061 LIPID PANEL: CPT | Performed by: FAMILY MEDICINE

## 2021-06-14 RX ORDER — TRAMADOL HYDROCHLORIDE 50 MG/1
TABLET ORAL
Qty: 40 TABLET | Refills: 3 | Status: SHIPPED | OUTPATIENT
Start: 2021-06-14 | End: 2021-09-23

## 2021-06-14 RX ORDER — FLUTICASONE PROPIONATE 50 MCG
1 SPRAY, SUSPENSION (ML) NASAL DAILY
Qty: 9.9 ML | Refills: 3 | Status: SHIPPED | OUTPATIENT
Start: 2021-06-14

## 2021-06-14 ASSESSMENT — ANXIETY QUESTIONNAIRES
5. BEING SO RESTLESS THAT IT IS HARD TO SIT STILL: NOT AT ALL
6. BECOMING EASILY ANNOYED OR IRRITABLE: NOT AT ALL
2. NOT BEING ABLE TO STOP OR CONTROL WORRYING: NOT AT ALL
1. FEELING NERVOUS, ANXIOUS, OR ON EDGE: NOT AT ALL
7. FEELING AFRAID AS IF SOMETHING AWFUL MIGHT HAPPEN: NOT AT ALL
GAD7 TOTAL SCORE: 0
IF YOU CHECKED OFF ANY PROBLEMS ON THIS QUESTIONNAIRE, HOW DIFFICULT HAVE THESE PROBLEMS MADE IT FOR YOU TO DO YOUR WORK, TAKE CARE OF THINGS AT HOME, OR GET ALONG WITH OTHER PEOPLE: NOT DIFFICULT AT ALL
3. WORRYING TOO MUCH ABOUT DIFFERENT THINGS: NOT AT ALL

## 2021-06-14 ASSESSMENT — PATIENT HEALTH QUESTIONNAIRE - PHQ9
5. POOR APPETITE OR OVEREATING: NOT AT ALL
SUM OF ALL RESPONSES TO PHQ QUESTIONS 1-9: 0

## 2021-06-14 ASSESSMENT — MIFFLIN-ST. JEOR: SCORE: 1393.85

## 2021-06-14 NOTE — NURSING NOTE
"Chief Complaint   Patient presents with     Allergies     Recheck Medication     initial /80 (BP Location: Right arm, Cuff Size: Adult Regular)   Pulse 55   Temp 98.6  F (37  C) (Tympanic)   Resp 16   Ht 1.753 m (5' 9\")   Wt 68.9 kg (152 lb)   SpO2 99%   BMI 22.45 kg/m   Estimated body mass index is 22.45 kg/m  as calculated from the following:    Height as of this encounter: 1.753 m (5' 9\").    Weight as of this encounter: 68.9 kg (152 lb).  BP completed using cuff size: regular.   R arm      Health Maintenance that is potentially due pending provider review:  NONE    n/a    Wilner Serrano ma  "

## 2021-06-15 LAB
CHOLEST SERPL-MCNC: 178 MG/DL
HDLC SERPL-MCNC: 75 MG/DL
LDLC SERPL CALC-MCNC: 94 MG/DL
NONHDLC SERPL-MCNC: 103 MG/DL
TRIGL SERPL-MCNC: 46 MG/DL

## 2021-06-15 ASSESSMENT — ANXIETY QUESTIONNAIRES: GAD7 TOTAL SCORE: 0

## 2021-09-23 ENCOUNTER — OFFICE VISIT (OUTPATIENT)
Dept: FAMILY MEDICINE | Facility: CLINIC | Age: 46
End: 2021-09-23
Payer: COMMERCIAL

## 2021-09-23 ENCOUNTER — ANCILLARY PROCEDURE (OUTPATIENT)
Dept: GENERAL RADIOLOGY | Facility: CLINIC | Age: 46
End: 2021-09-23
Attending: FAMILY MEDICINE
Payer: COMMERCIAL

## 2021-09-23 VITALS
HEIGHT: 69 IN | OXYGEN SATURATION: 98 % | WEIGHT: 150.7 LBS | DIASTOLIC BLOOD PRESSURE: 82 MMHG | TEMPERATURE: 98.4 F | SYSTOLIC BLOOD PRESSURE: 123 MMHG | BODY MASS INDEX: 22.32 KG/M2 | HEART RATE: 71 BPM

## 2021-09-23 DIAGNOSIS — M25.531 RIGHT WRIST PAIN: Primary | ICD-10-CM

## 2021-09-23 DIAGNOSIS — Z23 NEED FOR PROPHYLACTIC VACCINATION AND INOCULATION AGAINST INFLUENZA: ICD-10-CM

## 2021-09-23 LAB
AMPHETAMINES UR QL: NOT DETECTED
BARBITURATES UR QL SCN: NOT DETECTED
BENZODIAZ UR QL SCN: NOT DETECTED
BUPRENORPHINE UR QL: NOT DETECTED
CANNABINOIDS UR QL: NOT DETECTED
COCAINE UR QL SCN: NOT DETECTED
D-METHAMPHET UR QL: NOT DETECTED
METHADONE UR QL SCN: NOT DETECTED
OPIATES UR QL SCN: NOT DETECTED
OXYCODONE UR QL SCN: NOT DETECTED
PCP UR QL SCN: NOT DETECTED
PROPOXYPH UR QL: NOT DETECTED
RHEUMATOID FACT SER NEPH-ACNC: <7 IU/ML
TRICYCLICS UR QL SCN: NOT DETECTED

## 2021-09-23 PROCEDURE — 90686 IIV4 VACC NO PRSV 0.5 ML IM: CPT | Performed by: FAMILY MEDICINE

## 2021-09-23 PROCEDURE — 80306 DRUG TEST PRSMV INSTRMNT: CPT | Performed by: FAMILY MEDICINE

## 2021-09-23 PROCEDURE — 86431 RHEUMATOID FACTOR QUANT: CPT | Performed by: FAMILY MEDICINE

## 2021-09-23 PROCEDURE — 36415 COLL VENOUS BLD VENIPUNCTURE: CPT | Performed by: FAMILY MEDICINE

## 2021-09-23 PROCEDURE — 73130 X-RAY EXAM OF HAND: CPT | Mod: RT | Performed by: RADIOLOGY

## 2021-09-23 PROCEDURE — 90471 IMMUNIZATION ADMIN: CPT | Performed by: FAMILY MEDICINE

## 2021-09-23 PROCEDURE — 99214 OFFICE O/P EST MOD 30 MIN: CPT | Mod: 25 | Performed by: FAMILY MEDICINE

## 2021-09-23 PROCEDURE — 86200 CCP ANTIBODY: CPT | Performed by: FAMILY MEDICINE

## 2021-09-23 RX ORDER — TRAMADOL HYDROCHLORIDE 50 MG/1
50-75 TABLET ORAL
Qty: 45 TABLET | Refills: 3 | Status: SHIPPED | OUTPATIENT
Start: 2021-09-23 | End: 2022-01-14

## 2021-09-23 ASSESSMENT — MIFFLIN-ST. JEOR: SCORE: 1382.95

## 2021-09-23 NOTE — LETTER
Opioid / Opioid Plus Controlled Substance Agreement    This is an agreement between you and your provider about the safe and appropriate use of controlled substance/opioids prescribed by your care team. Controlled substances are medicines that can cause physical and mental dependence (abuse).    There are strict laws about having and using these medicines. We here at [facility] are committing to working with you in your efforts to get better. To support you in this work, we ll help you schedule regular office appointments for medicine refills. If we must cancel or change your appointment for any reason, we ll make sure you have enough medicine to last until your next appointment.     As a Provider, I will:    Listen carefully to your concerns and treat you with respect.     Recommend a treatment plan that I believe is in your best interest. This plan may involve therapies other than opioid pain medication.     Talk with you often about the possible benefits, and the risk of harm of any medicine that we prescribe for you.     Provide a plan on how to taper (discontinue or go off) using this medicine if the decision is made to stop its use.    As a Patient, I understand that opioid(s):     Are a controlled substance prescribed by my care team to help me function or work and manage my condition(s).     Are strong medicines and can cause serious side effects such as:    Drowsiness, which can seriously affect my driving ability    A lower breathing rate, enough to cause death    Harm to my thinking ability     Depression     Abuse of and addiction to this medicine    Need to be taken exactly as prescribed. Combining opioids with certain medicines or chemicals (such as illegal drugs, sedatives, sleeping pills, and benzodiazepines) can be dangerous or even fatal. If I stop opioids suddenly, I may have severe withdrawal symptoms.    Do not work for all types of pain nor for all patients. If they re not helpful, I may  be asked to stop them.        The risks, benefits and side effects of these medicine(s) were explained to me. I agree that:  1. I will take part in other treatments as advised by my care team. This may be psychiatry or counseling, physical therapy, behavioral therapy, group treatment or a referral to a specialist.     2. I will keep all my appointments. I understand that this is part of the monitoring of opioids. My care team may require an office visit for EVERY opioid/controlled substance refill. If I miss appointments or don t follow instructions, my care team may stop my medicine.    3. I will take my medicines as prescribed. I will not change the dose or schedule unless my care team tells me to. There will be no refills if I run out early.     4. I may be asked to come to the clinic and complete a urine drug test or complete a pill count at any time. If I don t give a urine sample or participate in a pill count, the care team may stop my medicine.    5. I will only receive prescriptions from this clinic for chronic pain. If I am treated by another provider for acute pain issues, I will tell them that I am taking opioid pain medication for chronic pain and that I have a treatment agreement with this provider. I will inform my [facility] care team within one business day if I am given a prescription for any pain medication by another healthcare provider. My [facility] care team can contact other providers and pharmacists about my use of any medicines.    6. It is up to me to make sure that I don t run out of my medicines on weekends or holidays. If my care team is willing to refill my opioid prescription without a visit, I must request refills only during office hours. Refills may take up to 3 business days to process. I will use one pharmacy to fill all my opioid and other controlled substance prescriptions. I will notify the clinic about any changes to my insurance or medication  availability.    7. I am responsible for my prescriptions. If the medicine/prescription is lost, stolen or destroyed, it will not be replaced. I also agree not to share controlled substance medicines with anyone.    8. I am aware I should not use any illegal or recreational drugs. I agree not to drink alcohol unless my care team says I can.       9. If I enroll in the [place] [facility] Cannabis program, I will tell my care team prior to my next refill.     10. I will tell my care team right away if I become pregnant, have a new medical problem treated outside of my regular clinic, or have a change in my medications.    11. I understand that this medicine can affect my thinking, judgment and reaction time. Alcohol and drugs affect the brain and body, which can affect the safety of my driving. Being under the influence of alcohol or drugs can affect my decision-making, behaviors, personal safety, and the safety of others. Driving while impaired (DWI) can occur if a person is driving, operating, or in physical control of a car, motorcycle, boat, snowmobile, ATV, motorbike, off-road vehicle, or any other motor vehicle ([place] Statute 169A.20). I understand the risk if I choose to drive or operate any vehicle or machinery.    I understand that if I do not follow any of the conditions above, my prescriptions or treatment may be stopped or changed.          Opioids  What You Need to Know    What are opioids?   Opioids are pain medicines that must be prescribed by a doctor. They are also known as narcotics.     Examples are:   1. morphine (MS Contin, Patti)  2. oxycodone (Oxycontin)  3. oxycodone and acetaminophen (Percocet)  4. hydrocodone and acetaminophen (Vicodin, Norco)   5. fentanyl patch (Duragesic)   6. hydromorphone (Dilaudid)   7. methadone  8. codeine (Tylenol #3)     What do opioids do well?   Opioids are best for severe short-term pain such as after a surgery or injury. They may work well for cancer pain. They may  help some people with long-lasting (chronic) pain.     What do opioids NOT do well?   Opioids never get rid of pain entirely, and they don t work well for most patients with chronic pain. Opioids don t reduce swelling, one of the causes of pain.                                    Other ways to manage chronic pain and improve function include:       Treat the health problem that may be causing pain    Anti-inflammation medicines, which reduce swelling and tenderness, such as ibuprofen (Advil, Motrin) or naproxen (Aleve)    Acetaminophen (Tylenol)    Antidepressants and anti-seizure medicines, especially for nerve pain    Topical treatments such as patches or creams    Injections or nerve blocks    Chiropractic or osteopathic treatment    Acupuncture, massage, deep breathing, meditation, visual imagery, aromatherapy    Use heat or ice at the pain site    Physical therapy     Exercise    Stop smoking    Take part in therapy       Risks and side effects     Talk to your doctor before you start or decide to keep taking opioids. Possible side effects include:      Lowering your breathing rate enough to cause death    Overdose, including death, especially if taking higher than prescribed doses    Worse depression symptoms; less pleasure in things you usually enjoy    Feeling tired or sluggish    Slower thoughts or cloudy thinking    Being more sensitive to pain over time; pain is harder to control    Trouble sleeping or restless sleep    Changes in hormone levels (for example, less testosterone)    Changes in sex drive or ability to have sex    Constipation    Unsafe driving    Itching and sweating    Dizziness    Nausea, throwing up and dry mouth    What else should I know about opioids?    Opioids may lead to dependence, tolerance, or addiction.      Dependence means that if you stop or reduce the medicine too quickly, you will have withdrawal symptoms. These include loose poop (diarrhea), jitters, flu-like symptoms,  nervousness and tremors. Dependence is not the same as addiction.                       Tolerance means needing higher doses over time to get the same effect. This may increase the chance of serious side effects.      Addiction is when people improperly use a substance that harms their body, their mind or their relations with others. Use of opiates can cause a relapse of addiction if you have a history of drug or alcohol abuse.      People who have used opioids for a long time may have a lower quality of life, worse depression, higher levels of pain and more visits to doctors.    You can overdose on opioids. Take these steps to lower your risk of overdose:    1. Recognize the signs:  Signs of overdose include decrease or loss of consciousness (blackout), slowed breathing, trouble waking up and blue lips. If someone is worried about overdose, they should call 911.    2. Talk to your doctor about Narcan (naloxone).   If you are at risk for overdose, you may be given a prescription for Narcan. This medicine very quickly reverses the effects of opioids.   If you overdose, a friend or family member can give you Narcan while waiting for the ambulance. They need to know the signs of overdose and how to give Narcan.     3. Don't use alcohol or street drugs.   Taking them with opioids can cause death.    4. Do not take any of these medicines unless your doctor says it s OK. Taking these with opioids can cause death:    Benzodiazepines, such as lorazepam (Ativan), alprazolam (Xanax) or diazepam (Valium)    Muscle relaxers, such as cyclobenzaprine (Flexeril)    Sleeping pills like zolpidem (Ambien)     Other opioids      How to keep you and other people safe while taking opioids:    1. Never share your opioids with others.  Opioid medicines are regulated by the Drug Enforcement Agency (HAZEL). Selling or sharing medications is a criminal act.    2. Be sure to store opioids in a secure place, locked up if possible. Young children  can easily swallow them and overdose.    3. When you are traveling with your medicines, keep them in the original bottles. If you use a pill box, be sure you also carry a copy of your medicine list from your clinic or pharmacy.    4. Safe disposal of opioids    Most pharmacies have places to get rid of medicine, called disposal kiosks. Medicine disposal options are also available in every [facility]. Search your county and  medication disposal  to find more options. You can find more details at:  [email or web address]     I agree that my provider, clinic care team, and pharmacy may work with any city, state or federal law enforcement agency that investigates the misuse, sale, or other diversion of my controlled medicine. I will allow my provider to discuss my care with, or share a copy of, this agreement with any other treating provider, pharmacy or emergency room where I receive care.    I have read this agreement and have asked questions about anything I did not understand.    _______________________________________________________  Patient Signature - Renetta Lauren _____________________                   Date     _______________________________________________________  Provider Signature - Kendall Garza MD   _____________________                   Date     _______________________________________________________  Witness Signature (required if provider not present while patient signing)   _____________________                   Date

## 2021-09-23 NOTE — PROGRESS NOTES
Assessment & Plan     1. Right wrist pain  - XR Hand Right G/E 3 Views; Future  - Rheumatoid factor; Future  - Cyclic Citrullinated Peptide Antibody IgG; Future  - Drug Abuse Screen Panel 13, Urine (Pain Care Package) - lab collect; Future  - traMADol (ULTRAM) 50 MG tablet; Take 1-1.5 tablets (50-75 mg) by mouth nightly as needed (for wrist pain)  Dispense: 45 tablet; Refill: 3    2. Need for prophylactic vaccination and inoculation against influenza  - INFLUENZA VACCINE IM > 6 MONTHS VALENT IIV4 (AFLURIA/FLUZONE)    Return in about 6 months (around 3/23/2022) for Follow-up visit.    Kendall Garza MD  [facility]   Renetta is a 46 year old who presents for the following health issues  accompanied by her self:    HPI     Pain History:  When did you first notice your pain? - More than 6 weeks   Have you seen this provider for your pain in the past?   Yes   Where in your body do you have pain? Tips of fingers on right of hand and elbow ,   Are you seeing anyone else for your pain? No    PHQ-9 SCORE 7/30/2020 2/26/2021 6/14/2021   PHQ-9 Total Score MyChart 0 0 -   PHQ-9 Total Score 0 0 0       ANDREW-7 SCORE 2/26/2021 2/26/2021 6/14/2021   Total Score - - -   Total Score - 1 (minimal anxiety) -   Total Score 1 1 0       PEG Score 6/14/2021 6/14/2021 9/24/2021   PEG Total Score 2.33 2.33 2.33       Chronic Pain Follow Up:    Location of pain: right wrist  Analgesia/pain control:    - Recent changes:  and working for the [employer].     - Overall control: Tolerable with discomfort    - Current treatments: tramadol   Adherence:     - Do you ever take more pain medicine than prescribed? Yes- sometimes when the pain is severe   - When did you take your last dose of pain medicine?  Last night.   Adverse effects: No   PDMP Review       Value Time User    State PDMP site checked  Yes 6/14/2021  9:11 AM Kendall Garza MD        Last CSA Agreement:   Patient-Level CSA:   "  Controlled Substance Agreement - Opioid - Scan on 8/31/2020  1:23 PM  Controlled Substance Agreement - Opioid - Scan on 3/8/2019  4:47 PM       Last UDS: 9/23/2021    Recent worsening in her pain. 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   Sleeping with an Ice pack x two days. She also noticed swelling in the right thumb two days ago.     Getting up in the middle of the night to change the Ice pack.       Review of Systems   Constitutional, HEENT, cardiovascular, pulmonary, gi and gu systems are negative, except as otherwise noted.      Objective    /82   Pulse 71   Temp 98.4  F (36.9  C) (Oral)   Ht 1.753 m (5' 9\")   Wt 68.4 kg (150 lb 11.2 oz)   SpO2 98%   BMI 22.25 kg/m    Body mass index is 22.25 kg/m .  Physical Exam   GENERAL: healthy, alert and no distress  NECK: no adenopathy, no asymmetry, masses, or scars and thyroid normal to palpation  RESP: lungs clear to auscultation - no rales, rhonchi or wheezes  ABDOMEN: soft, nontender, no hepatosplenomegaly, no masses and bowel sounds normal  MS: no gross musculoskeletal defects noted, no edema    Results for orders placed or performed in visit on 09/23/21   XR Hand Right G/E 3 Views     Status: None    Narrative    XR HAND RT G/E 3 VW   9/23/2021 8:16 AM     HISTORY:  . Problem/pain.      Impression    IMPRESSION: Unremarkable exam.    CHICA CEDENO MD         SYSTEM ID:  [id number]   Results for orders placed or performed in visit on 09/23/21   Rheumatoid factor     Status: Normal   Result Value Ref Range    Rheumatoid Factor <7 <20 IU/mL   Drug Abuse Screen Panel 13, Urine (Pain Care Package) - lab collect     Status: Normal   Result Value Ref Range    Cannabinoids (73-swe-4-carboxy-9-THC) Not Detected Not Detected, Indeterminate    Phencyclidine Not Detected Not Detected, Indeterminate    Cocaine (Benzoylecgonine) Not Detected Not Detected, Indeterminate    Methamphetamine (d-Methamphetamine) Not " Detected Not Detected, Indeterminate    Opiates (Morphine) Not Detected Not Detected, Indeterminate    Amphetamine (d-Amphetamine) Not Detected Not Detected, Indeterminate    Benzodiazepines (Nordiazepam) Not Detected Not Detected, Indeterminate    Tricyclic Antidepressants (Desipramine) Not Detected Not Detected, Indeterminate    Methadone Not Detected Not Detected, Indeterminate    Barbiturates (Butalbital) Not Detected Not Detected, Indeterminate    Oxycodone Not Detected Not Detected, Indeterminate    Propoxyphene (Norpropoxyphene) Not Detected Not Detected, Indeterminate    Buprenorphine Not Detected Not Detected, Indeterminate

## 2021-09-24 LAB — CCP AB SER IA-ACNC: 1.2 U/ML

## 2021-11-12 ENCOUNTER — OFFICE VISIT (OUTPATIENT)
Dept: MIDWIFE SERVICES | Facility: CLINIC | Age: 46
End: 2021-11-12
Payer: COMMERCIAL

## 2021-11-12 VITALS
HEART RATE: 73 BPM | WEIGHT: 148 LBS | TEMPERATURE: 97.4 F | DIASTOLIC BLOOD PRESSURE: 92 MMHG | BODY MASS INDEX: 21.86 KG/M2 | SYSTOLIC BLOOD PRESSURE: 145 MMHG

## 2021-11-12 DIAGNOSIS — Z97.5 PRESENCE OF 13.5 MG LEVONORGESTREL-RELEASING INTRAUTERINE DEVICE (IUD): Primary | ICD-10-CM

## 2021-11-12 PROCEDURE — 99212 OFFICE O/P EST SF 10 MIN: CPT | Performed by: ADVANCED PRACTICE MIDWIFE

## 2021-11-13 NOTE — PROGRESS NOTES
HPI:  Renetta Lauren is a 46 year old female is a  .  No LMP recorded. (Menstrual status: IUD).    Patients records are available and reviewed at today's visit.  Past GYN history:  No STD history       Last PAP smear:  Normal  Last TSH: , normal?  N/A    Past Medical History:   Diagnosis Date     Asthma      Cervical high risk HPV (human papillomavirus) test positive 05/15/2014    05/15/14     Depression ages [age]    estrogen induced from OCP     Depressive disorder ages [age]     History of blood transfusion      Miscarriage 2011    1st trimester     PPROM Elevated Risk Factor 2015    Elevated risk with PPROM via Dr Karlos MONGE with persistent subchorionic hemorrhage.   Consult with patient on Betamethasone in 3rd trimester based on this risk unassociated with  ctx.    Please offer bethamethasone in 3rd trimester. (28 weeks)       Ulcer      UTI (lower urinary tract infection)        Past Surgical History:   Procedure Laterality Date     BIOPSY      dermatology     C APPENDECTOMY       C STOMACH SURGERY PROCEDURE UNLISTED      Appendectomy     GENITOURINARY SURGERY      postpartum d&c     OPERATIVE HYSTEROSCOPY WITH MORCELLATOR N/A 2015    Procedure: OPERATIVE HYSTEROSCOPY WITH MORCELLATOR;  Surgeon: Reyna Marshall MD;  Location: [facility] OR       Family History   Problem Relation Age of Onset     Family History Negative Other      Hypertension Mother      Other Cancer Mother         Melanoma at age 27     Cancer Mother      Family History Negative Father      Coronary Artery Disease Maternal Grandfather      Hypertension Maternal Grandfather      Breast Cancer Maternal Grandmother         Cancer occurred in her late 80s       Social History     Socioeconomic History     Marital status: Single     Spouse name: None     Number of children: 0     Years of education: None     Highest education level: None   Occupational History     None   Tobacco Use     Smoking status: Never  Smoker     Smokeless tobacco: Never Used   Substance and Sexual Activity     Alcohol use: Yes     Alcohol/week: 0.0 standard drinks     Comment: 1-4 glasses of wine per week     Drug use: Never     Sexual activity: Not Currently     Partners: Male     Birth control/protection: I.U.D.   Other Topics Concern     Parent/sibling w/ CABG, MI or angioplasty before 65F 55M? No   Social History Narrative        Works as a .        Social Documentation:        Balanced Diet: YES    Calcium intake: 2-3 per day    Caffeine: 2 per day    Exercise:  type of activity cardio;  3-4 times per week    Sunscreen: Yes    Seatbelts:  Yes    Self Breast Exam:  Yes    Self Testicular Exam: na    Physical/Emotional/Sexual Abuse: No    Do you feel safe in your environment? Yes        Cholesterol screen up to date: fam hx, had it checked 2 years ago    Eye Exam up to date: Yes    Dental Exam up to date: Yes    Pap smear up to date: Yes, do today.  Last was 06/08, nml    Mammogram up to date: Does Not Apply    Dexa Scan up to date: Does Not Apply    Colonoscopy up to date: Does Not Apply    Immunizations up to date: Yes    Glucose screen if over 40:  na                 Social Determinants of Health     Financial Resource Strain: Not on file   Food Insecurity: Not on file   Transportation Needs: Not on file   Physical Activity: Not on file   Stress: Not on file   Social Connections: Not on file   Intimate Partner Violence: Not on file   Housing Stability: Not on file       Allergies: Nkda [no known drug allergies]    Current Outpatient Medications   Medication Sig Dispense Refill     fluticasone (FLONASE) 50 MCG/ACT nasal spray Spray 1 spray into both nostrils daily 9.9 mL 3     SUMAtriptan (IMITREX) 50 MG tablet Take 1 tablet (50 mg) by mouth at onset of headache for migraine May repeat in 2 hours. Max 4 tablets/24 hours. 12 tablet 1     tazarotene (TAZORAC) 0.05 % external cream Externally apply topically At Bedtime Apply to chest  and upper back. Stop using if the skin becomes too dry. 60 g 3       ROS:  C: NEGATIVE for fever, chills, change in weight  I: NEGATIVE for worrisome rashes, moles or lesions  E: NEGATIVE for vision changes or irritation  E/M: NEGATIVE for ear, mouth and throat problems  R: NEGATIVE for significant cough or SOB  CV: NEGATIVE for chest pain, palpitations or peripheral edema  GI: NEGATIVE for nausea, abdominal pain, heartburn, or change in bowel habits  : NEGATIVE for frequency, dysuria, hematuria, vaginal discharge  M: NEGATIVE for significant arthralgias or myalgia  N: NEGATIVE for weakness, dizziness or paresthesias  E: NEGATIVE for temperature intolerance, skin/hair changes  P: NEGATIVE for changes in mood or affect    EXAM:  Blood pressure (!) 145/92, pulse 73, temperature 97.4  F (36.3  C), temperature source Oral, weight 67.1 kg (148 lb), not currently breastfeeding.   BMI= Body mass index is 21.86 kg/m .  General - pleasant female in no acute distress.  Pelvic exam:  External genitalia appeared normal without lesion.  Urethral meatus, perineum, and anus appeared normal. Cervix is normal with IUD string present and vagina appeared normal, without lesion or discharge. Exam revealed a  normal sized non-tender uterus, with no adnexal masses.  Rectovaginal deferred.   Musculoskeletal - no gross deformities.  Skin- no rashes seen  Neurological - normal mood and affect.      ASSESSMENT/PLAN:  IUD present and in tact.        AMBREEN Chávez CNM

## 2021-11-18 DIAGNOSIS — G43.009 MIGRAINE WITHOUT AURA AND WITHOUT STATUS MIGRAINOSUS, NOT INTRACTABLE: ICD-10-CM

## 2021-11-19 RX ORDER — SUMATRIPTAN 50 MG/1
50 TABLET, FILM COATED ORAL
Qty: 12 TABLET | Refills: 0 | Status: SHIPPED | OUTPATIENT
Start: 2021-11-19 | End: 2022-01-28

## 2021-12-19 ENCOUNTER — HEALTH MAINTENANCE LETTER (OUTPATIENT)
Age: 46
End: 2021-12-19

## 2022-01-11 ENCOUNTER — MYC MEDICAL ADVICE (OUTPATIENT)
Dept: FAMILY MEDICINE | Facility: CLINIC | Age: 47
End: 2022-01-11
Payer: COMMERCIAL

## 2022-01-13 NOTE — TELEPHONE ENCOUNTER
FS,    Please see Spring[employer] messages.  Tramadol is not on patient's current medication list.    Thanks,  Devika Canseco RN

## 2022-01-14 DIAGNOSIS — M25.531 RIGHT WRIST PAIN: ICD-10-CM

## 2022-01-14 RX ORDER — TRAMADOL HYDROCHLORIDE 50 MG/1
50-75 TABLET ORAL
Qty: 45 TABLET | Refills: 3 | Status: SHIPPED | OUTPATIENT
Start: 2022-01-14 | End: 2022-05-12

## 2022-01-14 RX ORDER — TRAMADOL HYDROCHLORIDE 50 MG/1
50-75 TABLET ORAL DAILY
Qty: 10 TABLET | Refills: 0 | Status: SHIPPED | OUTPATIENT
Start: 2022-01-14 | End: 2022-05-03

## 2022-02-17 PROBLEM — G89.4 CHRONIC PAIN SYNDROME: Status: RESOLVED | Noted: 2019-02-08 | Resolved: 2021-02-26

## 2022-02-17 PROBLEM — F11.90 CHRONIC, CONTINUOUS USE OF OPIOIDS: Status: ACTIVE | Noted: 2021-02-26

## 2022-04-10 ENCOUNTER — HEALTH MAINTENANCE LETTER (OUTPATIENT)
Age: 47
End: 2022-04-10

## 2022-04-20 ENCOUNTER — ANCILLARY PROCEDURE (OUTPATIENT)
Dept: MAMMOGRAPHY | Facility: CLINIC | Age: 47
End: 2022-04-20
Attending: FAMILY MEDICINE
Payer: COMMERCIAL

## 2022-04-20 DIAGNOSIS — Z12.31 VISIT FOR SCREENING MAMMOGRAM: ICD-10-CM

## 2022-04-20 PROCEDURE — 77067 SCR MAMMO BI INCL CAD: CPT | Mod: GC | Performed by: RADIOLOGY

## 2022-04-20 PROCEDURE — 77063 BREAST TOMOSYNTHESIS BI: CPT | Mod: GC | Performed by: RADIOLOGY

## 2022-05-03 ENCOUNTER — MYC REFILL (OUTPATIENT)
Dept: FAMILY MEDICINE | Facility: CLINIC | Age: 47
End: 2022-05-03
Payer: COMMERCIAL

## 2022-05-03 DIAGNOSIS — M25.531 RIGHT WRIST PAIN: ICD-10-CM

## 2022-05-03 DIAGNOSIS — G43.009 MIGRAINE WITHOUT AURA AND WITHOUT STATUS MIGRAINOSUS, NOT INTRACTABLE: ICD-10-CM

## 2022-05-04 RX ORDER — SUMATRIPTAN 50 MG/1
50 TABLET, FILM COATED ORAL
Qty: 12 TABLET | Refills: 1 | Status: SHIPPED | OUTPATIENT
Start: 2022-05-04 | End: 2022-07-05

## 2022-05-04 RX ORDER — TRAMADOL HYDROCHLORIDE 50 MG/1
50-75 TABLET ORAL DAILY
Qty: 45 TABLET | Refills: 0 | Status: SHIPPED | OUTPATIENT
Start: 2022-05-08 | End: 2022-06-07

## 2022-05-04 NOTE — TELEPHONE ENCOUNTER
Imitrex:  Prescription approved per [facility] Refill Protocol.    Adrianne WAGONER RN

## 2022-05-04 NOTE — TELEPHONE ENCOUNTER
Tramadol:  Routing refill request to provider for review/approval because:  Drug not on the [facility] refill protocol     Adrianne WAGONER RN

## 2022-05-11 ASSESSMENT — ENCOUNTER SYMPTOMS
FREQUENCY: 0
PALPITATIONS: 0
DIARRHEA: 0
NAUSEA: 0
DYSURIA: 0
HEARTBURN: 0
BREAST MASS: 0
JOINT SWELLING: 0
HEADACHES: 1
HEMATOCHEZIA: 0
EYE PAIN: 0
WEAKNESS: 0
CONSTIPATION: 0
CHILLS: 0
HEMATURIA: 0
SORE THROAT: 0
SHORTNESS OF BREATH: 0
MYALGIAS: 1
NERVOUS/ANXIOUS: 0
DIZZINESS: 0
ABDOMINAL PAIN: 0
FEVER: 0
COUGH: 0
ARTHRALGIAS: 0
PARESTHESIAS: 0

## 2022-05-11 NOTE — PROGRESS NOTES
SUBJECTIVE:   CC: Renetta Lauren is an 46 year old woman who presents for preventive health visit.     Healthy Habits:     Getting at least 3 servings of Calcium per day:  Yes    Bi-annual eye exam:  NO    Dental care twice a year:  Yes    Sleep apnea or symptoms of sleep apnea:  None    Diet:  Regular (no restrictions)    Frequency of exercise:  6-7 days/week    Duration of exercise:  30-45 minutes    Taking medications regularly:  Yes    Medication side effects:  None    PHQ-2 Total Score: 0    Additional concerns today:  Yes    Weight loss: 150lbs to 138lbs. Most of the weight loss happened after she stopped prozac.   Wrist pain improved. Shoulder pain persisted.     Today's PHQ-2 Score:   PHQ-2 ( 1999 Pfizer) 5/11/2022   Q1: Little interest or pleasure in doing things 0   Q2: Feeling down, depressed or hopeless 0   PHQ-2 Score 0   PHQ-2 Total Score (12-17 Years)- Positive if 3 or more points; Administer PHQ-A if positive -   Q1: Little interest or pleasure in doing things Not at all   Q2: Feeling down, depressed or hopeless Not at all   PHQ-2 Score 0     Abuse: Current or Past (Physical, Sexual or Emotional) - No  Do you feel safe in your environment? Yes    Have you ever done Advance Care Planning? (For example, a Health Directive, POLST, or a discussion with a medical provider or your loved ones about your wishes): No, advance care planning information given to patient to review.  Patient plans to discuss their wishes with loved ones or provider.      Social History     Tobacco Use     Smoking status: Never Smoker     Smokeless tobacco: Never Used   Substance Use Topics     Alcohol use: Yes     Comment: 1-4 glasses of wine per week     If you drink alcohol do you typically have >3 drinks per day or >7 drinks per week? No  Reviewed orders with patient.  Reviewed health maintenance and updated orders accordingly - Yes  Lab work is in process    Breast Cancer Screening:    Breast CA Risk Assessment (FHS-7)  "2/24/2021   Do you have a family history of breast, colon, or ovarian cancer? No / Unknown     Mammogram Screening - Offered annual screening and updated Health Maintenance for mutual plan based on risk factor consideration    Pertinent mammograms are reviewed under the imaging tab.    History of abnormal Pap smear: NO - age 30-65 PAP every 5 years with negative HPV co-testing recommended  PAP / HPV Latest Ref Rng & Units 2/25/2021 8/19/2016 8/1/2011   PAP (Historical) - NIL NIL NIL   HPV16 NEG:Negative Negative Negative -   HPV18 NEG:Negative Negative Negative -   HRHPV NEG:Negative Negative Negative -     Reviewed and updated as needed this visit by clinical staff   Tobacco  Allergies  Meds   Med Hx  Surg Hx  Fam Hx  Soc Hx          Reviewed and updated as needed this visit by Provider                       Review of Systems   Constitutional: Negative for chills and fever.   HENT: Negative for congestion, ear pain, hearing loss and sore throat.    Eyes: Negative for pain and visual disturbance.   Respiratory: Negative for cough and shortness of breath.    Cardiovascular: Negative for chest pain, palpitations and peripheral edema.   Gastrointestinal: Negative for abdominal pain, constipation, diarrhea, heartburn, hematochezia and nausea.   Breasts:  Negative for tenderness, breast mass and discharge.   Genitourinary: Positive for vaginal bleeding. Negative for dysuria, frequency, genital sores, hematuria, pelvic pain, urgency and vaginal discharge.   Musculoskeletal: Positive for myalgias. Negative for arthralgias and joint swelling.   Skin: Negative for rash.   Neurological: Positive for headaches. Negative for dizziness, weakness and paresthesias.   Psychiatric/Behavioral: Negative for mood changes. The patient is not nervous/anxious.         OBJECTIVE:   /78   Pulse 62   Temp 98.1  F (36.7  C)   Resp 18   Ht 1.753 m (5' 9\")   Wt 62.7 kg (138 lb 4.8 oz)   LMP 05/05/2022 (Approximate)   SpO2 95% "   BMI 20.42 kg/m    Physical Exam  GENERAL: healthy, alert and no distress  EYES: Eyes grossly normal to inspection, PERRL and conjunctivae and sclerae normal  HENT: ear canals and TM's normal, nose and mouth without ulcers or lesions  NECK: no adenopathy, no asymmetry, masses, or scars and thyroid normal to palpation  RESP: lungs clear to auscultation - no rales, rhonchi or wheezes  BREAST: normal without masses, tenderness or nipple discharge and no palpable axillary masses or adenopathy  CV: regular rate and rhythm, normal S1 S2, no S3 or S4, no murmur, click or rub, no peripheral edema and peripheral pulses strong  ABDOMEN: soft, nontender, no hepatosplenomegaly, no masses and bowel sounds normal  MS: no gross musculoskeletal defects noted, no edema  SKIN: no suspicious lesions or rashes  NEURO: Normal strength and tone, mentation intact and speech normal  PSYCH: mentation appears normal, affect normal/bright    Diagnostic Test Results:  Labs reviewed in Epic    ASSESSMENT/PLAN:       ICD-10-CM    1. Routine general medical examination at a health care facility  Z00.00 Drug Abuse Screen Panel 13, Urine (Pain Care Package) - lab collect     CBC with platelets     Comprehensive metabolic panel (BMP + Alb, Alk Phos, ALT, AST, Total. Bili, TP)     Drug Abuse Screen Panel 13, Urine (Pain Care Package) - lab collect     CBC with platelets     Comprehensive metabolic panel (BMP + Alb, Alk Phos, ALT, AST, Total. Bili, TP)   2. Spider veins  I78.1 Vascular Surgery Referral   3. Adult acne  L70.9 tazarotene (TAZORAC) 0.05 % external cream   4. Right wrist pain  M25.531 traMADol (ULTRAM) 50 MG tablet     Drug Abuse Screen Panel 13, Urine (Pain Care Package) - lab collect     Drug Abuse Screen Panel 13, Urine (Pain Care Package) - lab collect   5. Encounter for screening for malignant neoplasm of colon  Z12.11 Adult Gastro Ref - Procedure Only       Patient has been advised of split billing requirements and indicates  "understanding: Yes    COUNSELING:  Reviewed preventive health counseling, as reflected in patient instructions       Regular exercise       Healthy diet/nutrition       Vision screening       Hearing screening    Estimated body mass index is 20.42 kg/m  as calculated from the following:    Height as of this encounter: 1.753 m (5' 9\").    Weight as of this encounter: 62.7 kg (138 lb 4.8 oz).        She reports that she has never smoked. She has never used smokeless tobacco.      Counseling Resources:  ATP IV Guidelines  Pooled Cohorts Equation Calculator  Breast Cancer Risk Calculator  BRCA-Related Cancer Risk Assessment: FHS-7 Tool  FRAX Risk Assessment  ICSI Preventive Guidelines  Dietary Guidelines for Americans, 2010  USDA's MyPlate  ASA Prophylaxis  Lung CA Screening    Kendall Garza MD  [facility] [place]  "

## 2022-05-12 ENCOUNTER — OFFICE VISIT (OUTPATIENT)
Dept: FAMILY MEDICINE | Facility: CLINIC | Age: 47
End: 2022-05-12
Payer: COMMERCIAL

## 2022-05-12 VITALS
WEIGHT: 138.3 LBS | HEIGHT: 69 IN | SYSTOLIC BLOOD PRESSURE: 122 MMHG | TEMPERATURE: 98.1 F | DIASTOLIC BLOOD PRESSURE: 78 MMHG | OXYGEN SATURATION: 95 % | BODY MASS INDEX: 20.48 KG/M2 | HEART RATE: 62 BPM | RESPIRATION RATE: 18 BRPM

## 2022-05-12 DIAGNOSIS — L70.9 ADULT ACNE: ICD-10-CM

## 2022-05-12 DIAGNOSIS — I78.1 SPIDER VEINS: ICD-10-CM

## 2022-05-12 DIAGNOSIS — Z00.00 ROUTINE GENERAL MEDICAL EXAMINATION AT A HEALTH CARE FACILITY: Primary | ICD-10-CM

## 2022-05-12 DIAGNOSIS — M25.531 RIGHT WRIST PAIN: ICD-10-CM

## 2022-05-12 DIAGNOSIS — Z12.11 ENCOUNTER FOR SCREENING FOR MALIGNANT NEOPLASM OF COLON: ICD-10-CM

## 2022-05-12 LAB
AMPHETAMINES UR QL: NOT DETECTED
BARBITURATES UR QL SCN: NOT DETECTED
BENZODIAZ UR QL SCN: NOT DETECTED
BUPRENORPHINE UR QL: NOT DETECTED
CANNABINOIDS UR QL: NOT DETECTED
COCAINE UR QL SCN: NOT DETECTED
D-METHAMPHET UR QL: NOT DETECTED
ERYTHROCYTE [DISTWIDTH] IN BLOOD BY AUTOMATED COUNT: 12.5 % (ref 10–15)
HCT VFR BLD AUTO: 41 % (ref 35–47)
HGB BLD-MCNC: 13.2 G/DL (ref 11.7–15.7)
MCH RBC QN AUTO: 31.2 PG (ref 26.5–33)
MCHC RBC AUTO-ENTMCNC: 32.2 G/DL (ref 31.5–36.5)
MCV RBC AUTO: 97 FL (ref 78–100)
METHADONE UR QL SCN: NOT DETECTED
OPIATES UR QL SCN: NOT DETECTED
OXYCODONE UR QL SCN: NOT DETECTED
PCP UR QL SCN: NOT DETECTED
PLATELET # BLD AUTO: 166 10E3/UL (ref 150–450)
PROPOXYPH UR QL: NOT DETECTED
RBC # BLD AUTO: 4.23 10E6/UL (ref 3.8–5.2)
TRICYCLICS UR QL SCN: NOT DETECTED
WBC # BLD AUTO: 5.3 10E3/UL (ref 4–11)

## 2022-05-12 PROCEDURE — 80053 COMPREHEN METABOLIC PANEL: CPT | Performed by: FAMILY MEDICINE

## 2022-05-12 PROCEDURE — 36415 COLL VENOUS BLD VENIPUNCTURE: CPT | Performed by: FAMILY MEDICINE

## 2022-05-12 PROCEDURE — 80306 DRUG TEST PRSMV INSTRMNT: CPT | Performed by: FAMILY MEDICINE

## 2022-05-12 PROCEDURE — 99396 PREV VISIT EST AGE 40-64: CPT | Performed by: FAMILY MEDICINE

## 2022-05-12 PROCEDURE — 85027 COMPLETE CBC AUTOMATED: CPT | Performed by: FAMILY MEDICINE

## 2022-05-12 PROCEDURE — 99213 OFFICE O/P EST LOW 20 MIN: CPT | Mod: 25 | Performed by: FAMILY MEDICINE

## 2022-05-12 RX ORDER — TRAMADOL HYDROCHLORIDE 50 MG/1
50-75 TABLET ORAL
Qty: 45 TABLET | Refills: 4 | Status: SHIPPED | OUTPATIENT
Start: 2022-06-04 | End: 2022-10-19

## 2022-05-13 ENCOUNTER — TELEPHONE (OUTPATIENT)
Dept: GASTROENTEROLOGY | Facility: CLINIC | Age: 47
End: 2022-05-13
Payer: COMMERCIAL

## 2022-05-13 ENCOUNTER — HOSPITAL ENCOUNTER (OUTPATIENT)
Facility: CLINIC | Age: 47
End: 2022-05-13
Attending: COLON & RECTAL SURGERY | Admitting: COLON & RECTAL SURGERY
Payer: COMMERCIAL

## 2022-05-13 LAB
ALBUMIN SERPL-MCNC: 3.8 G/DL (ref 3.4–5)
ALP SERPL-CCNC: 42 U/L (ref 40–150)
ALT SERPL W P-5'-P-CCNC: 22 U/L (ref 0–50)
ANION GAP SERPL CALCULATED.3IONS-SCNC: 5 MMOL/L (ref 3–14)
AST SERPL W P-5'-P-CCNC: 14 U/L (ref 0–45)
BILIRUB SERPL-MCNC: 0.4 MG/DL (ref 0.2–1.3)
BUN SERPL-MCNC: 13 MG/DL (ref 7–30)
CALCIUM SERPL-MCNC: 8.6 MG/DL (ref 8.5–10.1)
CHLORIDE BLD-SCNC: 105 MMOL/L (ref 94–109)
CO2 SERPL-SCNC: 30 MMOL/L (ref 20–32)
CREAT SERPL-MCNC: 0.7 MG/DL (ref 0.52–1.04)
GFR SERPL CREATININE-BSD FRML MDRD: >90 ML/MIN/1.73M2
GLUCOSE BLD-MCNC: 93 MG/DL (ref 70–99)
POTASSIUM BLD-SCNC: 4 MMOL/L (ref 3.4–5.3)
PROT SERPL-MCNC: 7 G/DL (ref 6.8–8.8)
SODIUM SERPL-SCNC: 140 MMOL/L (ref 133–144)

## 2022-05-13 NOTE — TELEPHONE ENCOUNTER
Screening Questions  BlueKIND OF PREP RedLOCATION [review exclusion criteria] GreenSEDATION TYPE  1. Have you had a positive covid test in the last 90 days? n     2. Do you have a legal guardian or medical Power of ?  Are you able to give consent for your medical care?n (Sedation review/consideration needed)    3. Are you active on mychart? y    4. What insurance is in the chart? are [facility]     3.   Ordering/Referring Provider: Kendall Garza MD in [facility]    4. BMI 20.42 [BMI OVER 40-EXTENDED PREP]  If greater than 40 review exclusion criteria [PAC APPT IF @ [facility]]        5.  Respiratory Screening :  [If yes to any of the following HOSPITAL setting only]     Do you use daily home oxygen? N    Do you have mod to severe Obstructive Sleep Apnea? N  [OKAY @ [facility] SH PH RI]   Do you have Pulmonary Hypertension? N     Do you have UNCONTROLLED asthma? N        6.   Have you had a heart or lung transplant? N      7.   Are you currently on dialysis? NN [ If yes, G-PREP & HOSPITAL setting only]     8.   Do you have chronic kidney disease? N [ If yes, G-PREP ]    9.   Have you had a stroke or Transient ischemic attack (TIA - aka  mini stroke ) within 6 months?  N (If yes, please review exclusion criteria)    10.   In the past 6 months, have you had any heart related issues including cardiomyopathy or heart attack? N           If yes, did it require cardiac stenting or other implantable device? N      11.   Do you have any implantable devices in your body (pacemaker, defib, LVAD)? N (If yes, please review exclusion criteria)    12.   Do you take nitroglycerin? N           If yes, how often? N  (if yes, HOSPITAL setting ONLY)    13.   Are you currently taking any blood thinners? N           [IF YES, INFORM PATIENT TO FOLLOW UP W/ ORDERING PROVIDER FOR BRIDGING INSTRUCTIONS]     14.   Do you have a diagnosis of diabetes? N   [ If yes, G-PREP ]    15.   [FEMALES] Are you currently pregnant? N     If yes, how many weeks? N    16.   Are you taking any prescription pain medications on a routine schedule?  N  [ If yes, EXTENDED PREP.] [If yes, MAC]    17.   Do you have any chemical dependencies such as alcohol, street drugs, or methadone?  N [If yes, MAC]    18.   Do you have any history of post-traumatic stress syndrome, severe anxiety or history of psychosis?  N  [If yes, MAC]    19.   Do you transfer independently?  Y    20.  On a regular basis do you go 3-5 days between bowel movements? N   [ If yes, EXTENDED PREP.]    21.   Preferred LOCAL Pharmacy for Pre Prescription        [id number] - [address]      Scheduling Details      Caller : Renetta Lauren  (Please ask for phone number if not scheduled by patient)    Type of Procedure Scheduled: COLONOSCOPY  Which Colonoscopy Prep was Sent?: KHADAR PELAYO CF PATIENTS & GROEN'S PATIENTS NEEDS EXTENDED PREP  Surgeon: DR. GILBERT  Date of Procedure: 06/08/2022  Location:       Sedation Type: CS  Conscious Sedation- Needs  for 6 hours after the procedure  MAC/General-Needs  for 24 hours after procedure    Pre-op Required at [facility], [place], [place] and OR for MAC sedation: N  (advise patient they will need a pre-op prior to procedure -)      Informed patient they will need an adult  Y  Cannot take any type of public or medical transportation alone    Pre-Procedure Covid test to be completed at [facility] or Externally: SCHEDULED    Confirmed Nurse will call to complete assessment Y    Additional comments: N

## 2022-05-30 DIAGNOSIS — Z11.59 ENCOUNTER FOR SCREENING FOR OTHER VIRAL DISEASES: Primary | ICD-10-CM

## 2022-06-13 ENCOUNTER — OFFICE VISIT (OUTPATIENT)
Dept: VASCULAR SURGERY | Facility: CLINIC | Age: 47
End: 2022-06-13
Attending: FAMILY MEDICINE
Payer: COMMERCIAL

## 2022-06-13 DIAGNOSIS — I78.1 SPIDER VEINS: ICD-10-CM

## 2022-06-13 PROCEDURE — 99203 OFFICE O/P NEW LOW 30 MIN: CPT | Performed by: INTERNAL MEDICINE

## 2022-06-13 ASSESSMENT — ENCOUNTER SYMPTOMS: HEADACHES: 1

## 2022-06-13 NOTE — NURSING NOTE
Patient Reported symptoms:    Bilateral leg   Heaviness None of the time   Achiness None of the time   Swelling None of the time   Throbbing A little bit of the time  Itching Some of the time   Appearance Slightly noticeable   Impact on work/activities Mildly reduced

## 2022-06-13 NOTE — LETTER
6/13/2022         RE: Renetta Lauren  [address]  [facility] [address]        Dear Colleague,    Thank you for referring your patient, Renetta Lauren, to the [facility] [place]. Please see a copy of my visit note below.    After Visit Follow Up  Per , patient was recommended to have 2 - 4 sessions at $407 of cosmetic sclerotherapy.    Reviewed with and gave to patient our vein clinic sclerotherapy packet of information which includes basic sclerotherapy information, pre-treatment instructions and post-treatment instructions.    Patient in agreement with plan and had no further questions.    Zamzam Angulo on 6/13/2022 at 12:03 PM        Vein Clinic Consultation Note    HPI:  Renetta Lauren is a 46 year old female who was seen today in consultation for cosmetically displeasing spider veins.  Renetta reports a hx of spider veins that cause itching and were treated with cosmetic sclerotherapy about 15 years ago.  Following her pregnancy about 6 years ago when she was on bed rest for three monhts, she has noted recurrent spider veins in both legs.  She has had recurrence of itching over some patches.  This interferes with her ability to sleep.  She is otherwise active.  She denies any leg achiness, heaviness, cramping, edema.          Review of Systems   Neurological: Positive for headaches.       PMH:  1.  Asthma     ALLERGIES:  Ragweeds    Allergies   Allergen Reactions     Ragweeds        MEDS:    Current Outpatient Medications:      fluticasone (FLONASE) 50 MCG/ACT nasal spray, Spray 1 spray into both nostrils daily, Disp: 9.9 mL, Rfl: 3     SUMAtriptan (IMITREX) 50 MG tablet, Take 1 tablet (50 mg) by mouth at onset of headache for migraine May repeat in 2 hours. Max 4 tablets/24 hours., Disp: 12 tablet, Rfl: 1     tazarotene (TAZORAC) 0.05 % external cream, Externally apply topically At Bedtime Apply to chest and upper back. Stop using if the skin becomes too dry., Disp:  60 g, Rfl: 3     traMADol (ULTRAM) 50 MG tablet, Take 1-1.5 tablets (50-75 mg) by mouth nightly as needed (for wrist and shoulder pain), Disp: 45 tablet, Rfl: 4    Current Facility-Administered Medications:      levonorgestrel (KYLEENA) 19.5 MG IUD 19.5 mg, 1 each, Intrauterine, Once, Reyna Marshall MD    SOCIAL HABITS:    Nonsmoker, occasional wine    FAMILY HISTORY:    Family History   Problem Relation Age of Onset     Family History Negative Other      Hypertension Mother      Other Cancer Mother         Melanoma at age 27     Cancer Mother      Family History Negative Father      Coronary Artery Disease Maternal Grandfather      Hypertension Maternal Grandfather      Breast Cancer Maternal Grandmother         Cancer occurred in her late 80s       PHYSICAL EXAMINATION:  Focused exam of her legs bilaterally demonstrates scattered, fine spider veins in various patches on her things and anterior shins bilaterally.  No varicose veins present, no edema.          ASSESSMENT:   1.  Cosmetically displeasing spider veins of the lower extremities bilaterally    PLAN:   Discussed cosmetic treatment of spider veins with sclerotherapy to approve appearance. Risks discussed including bruising, ulcers, skin discoloration, matting.  Discussed treatment may take multiple sessions to achieve desired appearance with a goal 80% improvement.  Renetta expressed understanding and would like to move forward with treatment.      Candida Barreto MD [facility]      Again, thank you for allowing me to participate in the care of your patient.        Sincerely,        Candida Barreto MD

## 2022-06-13 NOTE — PROGRESS NOTES
Vein Clinic Consultation Note    HPI:  Renetta Lauren is a 46 year old female who was seen today in consultation for cosmetically displeasing spider veins.  Renetta reports a hx of spider veins that cause itching and were treated with cosmetic sclerotherapy about 15 years ago.  Following her pregnancy about 6 years ago when she was on bed rest for three monhts, she has noted recurrent spider veins in both legs.  She has had recurrence of itching over some patches.  This interferes with her ability to sleep.  She is otherwise active.  She denies any leg achiness, heaviness, cramping, edema.          Review of Systems   Neurological: Positive for headaches.       PMH:  1.  Asthma     ALLERGIES:  Ragweeds    Allergies   Allergen Reactions     Ragweeds        MEDS:    Current Outpatient Medications:      fluticasone (FLONASE) 50 MCG/ACT nasal spray, Spray 1 spray into both nostrils daily, Disp: 9.9 mL, Rfl: 3     SUMAtriptan (IMITREX) 50 MG tablet, Take 1 tablet (50 mg) by mouth at onset of headache for migraine May repeat in 2 hours. Max 4 tablets/24 hours., Disp: 12 tablet, Rfl: 1     tazarotene (TAZORAC) 0.05 % external cream, Externally apply topically At Bedtime Apply to chest and upper back. Stop using if the skin becomes too dry., Disp: 60 g, Rfl: 3     traMADol (ULTRAM) 50 MG tablet, Take 1-1.5 tablets (50-75 mg) by mouth nightly as needed (for wrist and shoulder pain), Disp: 45 tablet, Rfl: 4    Current Facility-Administered Medications:      levonorgestrel (KYLEENA) 19.5 MG IUD 19.5 mg, 1 each, Intrauterine, Once, Reyna Marshall MD    SOCIAL HABITS:    Nonsmoker, occasional wine    FAMILY HISTORY:    Family History   Problem Relation Age of Onset     Family History Negative Other      Hypertension Mother      Other Cancer Mother         Melanoma at age 27     Cancer Mother      Family History Negative Father      Coronary Artery Disease Maternal Grandfather      Hypertension Maternal Grandfather       Breast Cancer Maternal Grandmother         Cancer occurred in her late 80s       PHYSICAL EXAMINATION:  Focused exam of her legs bilaterally demonstrates scattered, fine spider veins in various patches on her things and anterior shins bilaterally.  No varicose veins present, no edema.          ASSESSMENT:   1.  Cosmetically displeasing spider veins of the lower extremities bilaterally    PLAN:   Discussed cosmetic treatment of spider veins with sclerotherapy to approve appearance. Risks discussed including bruising, ulcers, skin discoloration, matting.  Discussed treatment may take multiple sessions to achieve desired appearance with a goal 80% improvement.  Renetta expressed understanding and would like to move forward with treatment.      Candida Barreto MD [facility]

## 2022-06-13 NOTE — PROGRESS NOTES
After Visit Follow Up  Per , patient was recommended to have 2 - 4 sessions at $407 of cosmetic sclerotherapy.    Reviewed with and gave to patient our vein clinic sclerotherapy packet of information which includes basic sclerotherapy information, pre-treatment instructions and post-treatment instructions.    Patient in agreement with plan and had no further questions.    Zamzam Angulo on 6/13/2022 at 12:03 PM

## 2022-07-25 ENCOUNTER — OFFICE VISIT (OUTPATIENT)
Dept: VASCULAR SURGERY | Facility: CLINIC | Age: 47
End: 2022-07-25

## 2022-07-25 DIAGNOSIS — I78.1 SPIDER VEINS: Primary | ICD-10-CM

## 2022-07-25 PROCEDURE — S9999 SALES TAX: HCPCS | Performed by: INTERNAL MEDICINE

## 2022-07-25 PROCEDURE — 36468 NJX SCLRSNT SPIDER VEINS: CPT | Performed by: INTERNAL MEDICINE

## 2022-07-25 NOTE — PROGRESS NOTES
Vein Clinic Sclerotherapy Note  July 25, 2022    Pre procedure Diagnosis:    Cosmetically displeasing telangiectasias of the legs bilaterally     Postoperative Diagnosis:  Same     Procedure:   Sclerotherapy of leg telangiectasias    Indication:  Ms. Lauren is a 47 year old woman who was evaluated in Vein Clinic. She has telangiectasias of the bilateral lower extremities and desired sclerotherapy for management. Details of the procedure including risks of bleeding, infection, ulceration, hyperpigmentation, deep vein thrombosis, and matting were discussed.  The patient understood and wished to proceed.  Informed consent was obtained.      Operative description:  She was positioned alternately supine and then prone on the procedure table.  Using lighted magnification, the spider veins were identified.  Each syringe of 0.5% Polidocanol was foamed with 2 parts air and 1 part solution.  The skin overlying the telangiectasias and veins was swabbed with alcohol. A 30-gauge needle was used to access the veins of the leg and these were serially injected with the foamed 0.5% Polidocanol, until filling and blanching of the venous network was achieved. A total of 4 mL of Polidocanol were used.     Hemostasis was obtained with dry gauze and pressure. The legs were then cleaned with saline solution and dressed appropriately with compression stockings.        The patient was asked to walk about 10-15 mins prior to leaving the clinic to return home.  She will return to clinic in 4-6 weeks for a post-operative check.      Sclerotherapy    Date/Time: 7/25/2022 12:35 PM  Performed by: Candida Barreto MD  Authorized by: Candida Barreto MD     Time out: Immediately prior to the procedure a time out was called    Preparation: Patient was prepped and draped in usual sterile fashion    Type:  Cosmetic  Session:  Full  Procedure side:  Bilateral  Solution/Amount:  .5 POLIDOCANOL  Patient tolerance:  Patient tolerated the  procedure well with no immediate complications  Wrap/Hose:  John Barreto MD [facility]

## 2022-07-25 NOTE — LETTER
7/25/2022         RE: Renetta Lauren  [address]  [facility] [address]        Dear Colleague,    Thank you for referring your patient, Renetta Lauren, to the [facility] [place]. Please see a copy of my visit note below.        Vein Clinic Sclerotherapy Note  July 25, 2022    Pre procedure Diagnosis:    Cosmetically displeasing telangiectasias of the legs bilaterally     Postoperative Diagnosis:  Same     Procedure:   Sclerotherapy of leg telangiectasias    Indication:  Ms. Lauren is a 47 year old woman who was evaluated in Vein Clinic. She has telangiectasias of the bilateral lower extremities and desired sclerotherapy for management. Details of the procedure including risks of bleeding, infection, ulceration, hyperpigmentation, deep vein thrombosis, and matting were discussed.  The patient understood and wished to proceed.  Informed consent was obtained.      Operative description:  She was positioned alternately supine and then prone on the procedure table.  Using lighted magnification, the spider veins were identified.  Each syringe of 0.5% Polidocanol was foamed with 2 parts air and 1 part solution.  The skin overlying the telangiectasias and veins was swabbed with alcohol. A 30-gauge needle was used to access the veins of the leg and these were serially injected with the foamed 0.5% Polidocanol, until filling and blanching of the venous network was achieved. A total of 4 mL of Polidocanol were used.     Hemostasis was obtained with dry gauze and pressure. The legs were then cleaned with saline solution and dressed appropriately with compression stockings.        The patient was asked to walk about 10-15 mins prior to leaving the clinic to return home.  She will return to clinic in 4-6 weeks for a post-operative check.      Sclerotherapy    Date/Time: 7/25/2022 12:35 PM  Performed by: Candida Barreto MD  Authorized by: Candida Barreto MD     Time out: Immediately prior to  the procedure a time out was called    Preparation: Patient was prepped and draped in usual sterile fashion    Type:  Cosmetic  Session:  Full  Procedure side:  Bilateral  Solution/Amount:  .5 POLIDOCANOL  Patient tolerance:  Patient tolerated the procedure well with no immediate complications  Wrap/Hose:  John Barreto MD [facility]      Again, thank you for allowing me to participate in the care of your patient.        Sincerely,        Candida Barreto MD

## 2022-08-19 ENCOUNTER — LAB (OUTPATIENT)
Dept: LAB | Facility: CLINIC | Age: 47
End: 2022-08-19
Attending: COLON & RECTAL SURGERY
Payer: COMMERCIAL

## 2022-08-19 DIAGNOSIS — Z11.59 ENCOUNTER FOR SCREENING FOR OTHER VIRAL DISEASES: ICD-10-CM

## 2022-08-19 DIAGNOSIS — Z20.822 ENCOUNTER FOR LABORATORY TESTING FOR COVID-19 VIRUS: ICD-10-CM

## 2022-08-19 LAB — SARS-COV-2 RNA RESP QL NAA+PROBE: NEGATIVE

## 2022-08-19 PROCEDURE — U0005 INFEC AGEN DETEC AMPLI PROBE: HCPCS

## 2022-08-19 PROCEDURE — U0003 INFECTIOUS AGENT DETECTION BY NUCLEIC ACID (DNA OR RNA); SEVERE ACUTE RESPIRATORY SYNDROME CORONAVIRUS 2 (SARS-COV-2) (CORONAVIRUS DISEASE [COVID-19]), AMPLIFIED PROBE TECHNIQUE, MAKING USE OF HIGH THROUGHPUT TECHNOLOGIES AS DESCRIBED BY CMS-2020-01-R: HCPCS

## 2022-10-17 ENCOUNTER — MYC MEDICAL ADVICE (OUTPATIENT)
Dept: FAMILY MEDICINE | Facility: CLINIC | Age: 47
End: 2022-10-17

## 2022-10-17 DIAGNOSIS — M25.531 RIGHT WRIST PAIN: ICD-10-CM

## 2022-10-17 DIAGNOSIS — G43.009 MIGRAINE WITHOUT AURA AND WITHOUT STATUS MIGRAINOSUS, NOT INTRACTABLE: ICD-10-CM

## 2022-10-18 RX ORDER — SUMATRIPTAN 50 MG/1
50 TABLET, FILM COATED ORAL
Qty: 12 TABLET | Refills: 0 | Status: SHIPPED | OUTPATIENT
Start: 2022-10-18 | End: 2023-01-18

## 2022-10-18 NOTE — TELEPHONE ENCOUNTER
Imitrex  Medication is being filled for 1 time refill only due to:      Future Appointments 10/18/2022 - 4/16/2023      Date Visit Type Length Department Provider     11/14/2022 11:20 AM [facility] OFFICE VISIT  20 min [facility] Kendall Garza MD    Location Instructions:     [facility] is in [address] of the [facility] at [address] in [place]. The building is at the intersection with [facility] and along [facility]. This is the large, blue, glass building with a sculpture on the roof; please note there is no [place] signage on the exterior. Free lot parking is available.              11/17/2022  2:20 PM [facility] SPORTS MED 20 min  SPORTS MEDICINE Silver Cortez, DO    Location Instructions:     This clinic is located at: [address] [place] [address] [address] [place], [place]              11/28/2022  9:00 AM VEIN INJECTION 30 min  Candida Gregorio MD    Location Instructions:     Located at [address] [place] [address] [address] [facility] [id number]                 Yarely BANSAL RN

## 2022-10-18 NOTE — TELEPHONE ENCOUNTER
Tramadol  Routing refill request to provider for review/approval because:  Drug not on the [facility] refill protocol   Scheduled 11/14/22 for med follow up  Yarely BANSAL RN

## 2022-10-19 RX ORDER — TRAMADOL HYDROCHLORIDE 50 MG/1
50-75 TABLET ORAL
Qty: 45 TABLET | Refills: 4 | Status: SHIPPED | OUTPATIENT
Start: 2022-10-19 | End: 2023-03-13

## 2022-11-14 ENCOUNTER — MYC MEDICAL ADVICE (OUTPATIENT)
Dept: FAMILY MEDICINE | Facility: CLINIC | Age: 47
End: 2022-11-14

## 2022-11-16 ENCOUNTER — E-VISIT (OUTPATIENT)
Dept: URGENT CARE | Facility: URGENT CARE | Age: 47
End: 2022-11-16
Payer: COMMERCIAL

## 2022-11-16 DIAGNOSIS — J01.80 ACUTE NON-RECURRENT SINUSITIS OF OTHER SINUS: Primary | ICD-10-CM

## 2022-11-16 PROCEDURE — 99421 OL DIG E/M SVC 5-10 MIN: CPT | Performed by: FAMILY MEDICINE

## 2022-11-16 RX ORDER — BENZONATATE 100 MG/1
100-200 CAPSULE ORAL 3 TIMES DAILY PRN
Qty: 50 CAPSULE | Refills: 0 | Status: SHIPPED | OUTPATIENT
Start: 2022-11-16 | End: 2023-01-19

## 2022-11-17 ENCOUNTER — ANCILLARY PROCEDURE (OUTPATIENT)
Dept: GENERAL RADIOLOGY | Facility: CLINIC | Age: 47
End: 2022-11-17
Attending: FAMILY MEDICINE
Payer: COMMERCIAL

## 2022-11-17 ENCOUNTER — OFFICE VISIT (OUTPATIENT)
Dept: ORTHOPEDICS | Facility: CLINIC | Age: 47
End: 2022-11-17
Payer: COMMERCIAL

## 2022-11-17 VITALS
DIASTOLIC BLOOD PRESSURE: 62 MMHG | WEIGHT: 137.9 LBS | BODY MASS INDEX: 20.42 KG/M2 | SYSTOLIC BLOOD PRESSURE: 116 MMHG | HEIGHT: 69 IN

## 2022-11-17 DIAGNOSIS — M25.551 RIGHT HIP PAIN: Primary | ICD-10-CM

## 2022-11-17 DIAGNOSIS — M25.551 RIGHT HIP PAIN: ICD-10-CM

## 2022-11-17 PROCEDURE — 72100 X-RAY EXAM L-S SPINE 2/3 VWS: CPT | Mod: TC | Performed by: RADIOLOGY

## 2022-11-17 PROCEDURE — 99204 OFFICE O/P NEW MOD 45 MIN: CPT | Performed by: FAMILY MEDICINE

## 2022-11-17 PROCEDURE — 73502 X-RAY EXAM HIP UNI 2-3 VIEWS: CPT | Mod: TC | Performed by: INTERNAL MEDICINE

## 2022-11-17 RX ORDER — METHYLPREDNISOLONE 4 MG
TABLET, DOSE PACK ORAL
Qty: 21 TABLET | Refills: 0 | Status: SHIPPED | OUTPATIENT
Start: 2022-11-17 | End: 2023-01-19

## 2022-11-17 ASSESSMENT — PAIN SCALES - GENERAL: PAINLEVEL: MILD PAIN (2)

## 2022-11-17 NOTE — PATIENT INSTRUCTIONS
Dear Renetta Lauren    After reviewing your responses, I've been able to diagnose you with a sinus infection While this is most commonly caused by a virus, the symptoms you have given suggest you should be treated with antibiotics.     I have sent augmentin to your pharmacy to treat this infection. And also tessalon for the cough which can be used day and night.     It is important that you take all of your prescribed medication even if your symptoms are improving after a few doses. Taking all of your medicine helps prevent the symptoms from returning.     If your symptoms worsen, you develop chest pain or shortness of breath, fevers over 101, or are not improving in 5 days, please contact your primary care provider for an appointment or visit any of our convenient Walk-in Care or Urgent Care Centers to be seen which can be found on our website here.    Thanks again for choosing us as your health care partner,    Jeffry Gordon MD

## 2022-11-17 NOTE — PATIENT INSTRUCTIONS
Thank you for choosing [facility]    [facility] LOCATIONS  [place] [address] [address] [facility], 4th [place]   [place], [place], [address] [place] [address]   [phone]       APPOINTMENTS: [phone]    CARE QUESTIONS: [phone],    BILLING QUESTIONS: [phone]    FAX NUMBER: [phone]        Follow up: 6 weeks      1. Right hip pain        Physical Therapy orders have been placed with [facility] ([facility]ally [facility])  You can call [phone] to schedule at your convenience.

## 2022-11-17 NOTE — LETTER
"    11/17/2022         RE: Renetta Lauren  [address] [facility] [address]        Dear Colleague,    Thank you for referring your patient, Renetta Lauren, to the [facility] [place]. Please see a copy of my visit note below.    CHIEF COMPLAINT:  Pain of right hip and lower leg    HISTORY OF PRESENT ILLNESS  Ms. Lauren is a pleasant 47 year old year old female who presents to clinic today with right hip pain and lower leg pain.  Renetta explains that she recalls pain began in August 2021 during a yoga session.   At that time she experienced pain at the anterolateral thigh which she had a concern for hip flexor strain.  She consistently continued to stretch her hip to combat this, but it has been recalcitrant over the past year.  More recently she began to develop constant \"electric shock\" in anterior lower leg. Worse when laying down. Wakes at night.  This has increased over the past week or so.  She describes pain starting at the posterior lateral buttock region lateral thigh anterolateral thigh and then no pain down the leg into the lateral calf.    Notes that it is seemingly worse when she is lying down compared to walking, or other activities during the day.    Onset: rapid  Location: right hip  Quality:  throbing and burning  Duration: 1 years, August 2021  Severity: 5-6/10 at worst  Timing:constant  Modifying factors:  resting and non-use makes it better, movement and use makes it worse  Associated signs & symptoms: burning and pain in the lower leg, aching at the proximal thigh intermittent weakness in right leg  Previous similar pain: Yes  Treatments to date: ice, stretching, ibuprofen, Tramadol at night (for shoulder)    Additional history: as documented    Review of Systems:    Have you recently had a a fever, chills, weight loss? Yes, fever a few weeks ago, weight loss over a few years    Do you have any vision problems? No    Do you have any chest pain or edema? " No    Do you have any shortness of breath or wheezing?  No    Do you have stomach problems? No    Do you have any numbness or focal weakness? No    Do you have diabetes? No    Do you have problems with bleeding or clotting? Yes, borderline low platelet levels    Do you have an rashes or other skin lesions? No    MEDICAL HISTORY  Patient Active Problem List   Diagnosis     CARDIOVASCULAR SCREENING; LDL GOAL LESS THAN 160     Post-nasal drip     Screening for cervical cancer     Migraine without aura and without status migrainosus, not intractable     Human papilloma virus (HPV) infection     Chronic, continuous use of opioids     Cervical high risk HPV (human papillomavirus) test positive     Seasonal allergic rhinitis due to pollen       Current Outpatient Medications   Medication Sig Dispense Refill     amoxicillin-clavulanate (AUGMENTIN) 875-125 MG tablet Take 1 tablet by mouth 2 times daily for 7 days 14 tablet 0     benzonatate (TESSALON) 100 MG capsule Take 1-2 capsules (100-200 mg) by mouth 3 times daily as needed for cough 50 capsule 0     fluticasone (FLONASE) 50 MCG/ACT nasal spray Spray 1 spray into both nostrils daily 9.9 mL 3     SUMAtriptan (IMITREX) 50 MG tablet Take 1 tablet (50 mg) by mouth at onset of headache for migraine May repeat in 2 hours. Max 4 tablets/24 hours. 12 tablet 0     tazarotene (TAZORAC) 0.05 % external cream Externally apply topically At Bedtime Apply to chest and upper back. Stop using if the skin becomes too dry. 60 g 3     traMADol (ULTRAM) 50 MG tablet Take 1-1.5 tablets (50-75 mg) by mouth nightly as needed (for wrist and shoulder pain) 45 tablet 4       Allergies   Allergen Reactions     Ragweeds        Family History   Problem Relation Age of Onset     Family History Negative Other      Hypertension Mother      Other Cancer Mother         Melanoma at age 27     Cancer Mother      Family History Negative Father      Coronary Artery Disease Maternal Grandfather       "Hypertension Maternal Grandfather      Breast Cancer Maternal Grandmother         Cancer occurred in her late 80s       Additional medical/Social/Surgical histories reviewed in [facility] and updated as appropriate.       PHYSICAL EXAM  /62   Ht 1.753 m (5' 9\")   Wt 62.6 kg (137 lb 14.4 oz)   BMI 20.36 kg/m      General  - normal appearance, in no obvious distress  CV  - normal peripheral perfusion  Pulm  - normal respiratory pattern, non-labored  Musculoskeletal - lumbar spine  - stance: normal gait without limp, no obvious leg length discrepancy, normal heel and toe walk  - inspection: normal bone and joint alignment, no obvious scoliosis  - palpation: no paravertebral or bony tenderness  - Normal flexion, normal extension, sidebending, rotation  - strength: lower extremities 5/5 in all planes  - special tests:  (-) straight leg raise  (-) slump test  Musculoskeletal - R hip  - inspection: no swelling of anterolateral hip,  normal bone and joint alignment, no obvious deformity  - palpation: no lateral or anterior hip tenderness  - ROM: normal flexion, extension, IR, ER, abduction, adduction, not painful, no crepitus  - strength: 5/5 in all planes  - special tests:  (-) MICHELLE  (-) FADIR  no pain with axial femoral load  Neuro  - No lower extremity sensory deficits, grossly normal coordination, normal muscle tone  Skin  - no ecchymosis, erythema, warmth, or induration, no obvious rash  Psych  - interactive, appropriate, normal mood and affect    IMAGING : XR Pelvis with right hip 2V and lumbar 2V. Final results and radiologist's interpretation, available in the [facility] health record. Images were reviewed with the patient/family members in the office today. My personal interpretation of the performed imaging is minimal degenerative changes of hips.  Moderate disc space loss at L4-L5 and L5-S1. End plate osteophytes L4-L5.      ASSESSMENT & PLAN  Ms. Lauren is a 47 year old year old female who presents to clinic " today with right lower extremity pain increasing in intensity, frequency, and extending now to lower leg.    Discussed radiographs and that there is no significant degenerative changes that would likely be contributory.  Benign examination with good hip flexor strength no specific tenderness overlying iliopsoas, quadriceps.  I suspect lumbar etiology with radiculitis affecting thigh as well as now into the lower leg which seems to fit the L5 dermatome.    Diagnosis:  Lumbar degenerative disc disease  Chronic pain of right lower extremity.    Treatment options for hip as well as lumbar spine recommended today.  I think she would benefit greatly from starting with formal physical therapy.  I do believe she has some ongoing hip tightness, likely related to changes in biomechanics and gait disturbance.  I would however like them to focus in on lumbar herniated disc protocol to see how this may improve her symptoms.  Start Medrol pack tomorrow for the next 6 days to try to reduce symptomatology related to neuritis.  Discussed holding off on gabapentin given untoward side effects suffered by her mother previously.  We could consider trying this 300 mg nightly in the future though.      Lastly if she has no improvement over the next 4 to 6 weeks, then consider MRI lumbar and possibly hip.      Follow-up in 4 to 6 weeks to see how her progress is coming along.    It was a pleasure seeing Renetta today.    50 minutes on date of the encounter doing chart review, history and examination, independent imaging review, documentation, and additional activities noted above.      Silver Cortez DO, [facility]  Primary Care Sports Medicine      Again, thank you for allowing me to participate in the care of your patient.        Sincerely,        Silver Cortez DO

## 2022-11-17 NOTE — PROGRESS NOTES
"CHIEF COMPLAINT:  Pain of right hip and lower leg    HISTORY OF PRESENT ILLNESS  Ms. Lauren is a pleasant 47 year old year old female who presents to clinic today with right hip pain and lower leg pain.  Renetta explains that she recalls pain began in August 2021 during a yoga session.   At that time she experienced pain at the anterolateral thigh which she had a concern for hip flexor strain.  She consistently continued to stretch her hip to combat this, but it has been recalcitrant over the past year.  More recently she began to develop constant \"electric shock\" in anterior lower leg. Worse when laying down. Wakes at night.  This has increased over the past week or so.  She describes pain starting at the posterior lateral buttock region lateral thigh anterolateral thigh and then no pain down the leg into the lateral calf.    Notes that it is seemingly worse when she is lying down compared to walking, or other activities during the day.    Onset: rapid  Location: right hip  Quality:  throbing and burning  Duration: 1 years, August 2021  Severity: 5-6/10 at worst  Timing:constant  Modifying factors:  resting and non-use makes it better, movement and use makes it worse  Associated signs & symptoms: burning and pain in the lower leg, aching at the proximal thigh intermittent weakness in right leg  Previous similar pain: Yes  Treatments to date: ice, stretching, ibuprofen, Tramadol at night (for shoulder)    Additional history: as documented    Review of Systems:    Have you recently had a a fever, chills, weight loss? Yes, fever a few weeks ago, weight loss over a few years    Do you have any vision problems? No    Do you have any chest pain or edema? No    Do you have any shortness of breath or wheezing?  No    Do you have stomach problems? No    Do you have any numbness or focal weakness? No    Do you have diabetes? No    Do you have problems with bleeding or clotting? Yes, borderline low platelet levels    Do you " "have an rashes or other skin lesions? No    MEDICAL HISTORY  Patient Active Problem List   Diagnosis     CARDIOVASCULAR SCREENING; LDL GOAL LESS THAN 160     Post-nasal drip     Screening for cervical cancer     Migraine without aura and without status migrainosus, not intractable     Human papilloma virus (HPV) infection     Chronic, continuous use of opioids     Cervical high risk HPV (human papillomavirus) test positive     Seasonal allergic rhinitis due to pollen       Current Outpatient Medications   Medication Sig Dispense Refill     amoxicillin-clavulanate (AUGMENTIN) 875-125 MG tablet Take 1 tablet by mouth 2 times daily for 7 days 14 tablet 0     benzonatate (TESSALON) 100 MG capsule Take 1-2 capsules (100-200 mg) by mouth 3 times daily as needed for cough 50 capsule 0     fluticasone (FLONASE) 50 MCG/ACT nasal spray Spray 1 spray into both nostrils daily 9.9 mL 3     SUMAtriptan (IMITREX) 50 MG tablet Take 1 tablet (50 mg) by mouth at onset of headache for migraine May repeat in 2 hours. Max 4 tablets/24 hours. 12 tablet 0     tazarotene (TAZORAC) 0.05 % external cream Externally apply topically At Bedtime Apply to chest and upper back. Stop using if the skin becomes too dry. 60 g 3     traMADol (ULTRAM) 50 MG tablet Take 1-1.5 tablets (50-75 mg) by mouth nightly as needed (for wrist and shoulder pain) 45 tablet 4       Allergies   Allergen Reactions     Ragweeds        Family History   Problem Relation Age of Onset     Family History Negative Other      Hypertension Mother      Other Cancer Mother         Melanoma at age 27     Cancer Mother      Family History Negative Father      Coronary Artery Disease Maternal Grandfather      Hypertension Maternal Grandfather      Breast Cancer Maternal Grandmother         Cancer occurred in her late 80s       Additional medical/Social/Surgical histories reviewed in [facility] and updated as appropriate.       PHYSICAL EXAM  /62   Ht 1.753 m (5' 9\")   Wt 62.6 kg " (137 lb 14.4 oz)   BMI 20.36 kg/m      General  - normal appearance, in no obvious distress  CV  - normal peripheral perfusion  Pulm  - normal respiratory pattern, non-labored  Musculoskeletal - lumbar spine  - stance: normal gait without limp, no obvious leg length discrepancy, normal heel and toe walk  - inspection: normal bone and joint alignment, no obvious scoliosis  - palpation: no paravertebral or bony tenderness  - Normal flexion, normal extension, sidebending, rotation  - strength: lower extremities 5/5 in all planes  - special tests:  (-) straight leg raise  (-) slump test  Musculoskeletal - R hip  - inspection: no swelling of anterolateral hip,  normal bone and joint alignment, no obvious deformity  - palpation: no lateral or anterior hip tenderness  - ROM: normal flexion, extension, IR, ER, abduction, adduction, not painful, no crepitus  - strength: 5/5 in all planes  - special tests:  (-) MICHELLE  (-) FADIR  no pain with axial femoral load  Neuro  - No lower extremity sensory deficits, grossly normal coordination, normal muscle tone  Skin  - no ecchymosis, erythema, warmth, or induration, no obvious rash  Psych  - interactive, appropriate, normal mood and affect    IMAGING : XR Pelvis with right hip 2V and lumbar 2V. Final results and radiologist's interpretation, available in the [facility] health record. Images were reviewed with the patient/family members in the office today. My personal interpretation of the performed imaging is minimal degenerative changes of hips.  Moderate disc space loss at L4-L5 and L5-S1. End plate osteophytes L4-L5.      ASSESSMENT & PLAN  Ms. Lauren is a 47 year old year old female who presents to clinic today with right lower extremity pain increasing in intensity, frequency, and extending now to lower leg.    Discussed radiographs and that there is no significant degenerative changes that would likely be contributory.  Benign examination with good hip flexor strength no specific  tenderness overlying iliopsoas, quadriceps.  I suspect lumbar etiology with radiculitis affecting thigh as well as now into the lower leg which seems to fit the L5 dermatome.    Diagnosis:  Lumbar degenerative disc disease  Chronic pain of right lower extremity.    Treatment options for hip as well as lumbar spine recommended today.  I think she would benefit greatly from starting with formal physical therapy.  I do believe she has some ongoing hip tightness, likely related to changes in biomechanics and gait disturbance.  I would however like them to focus in on lumbar herniated disc protocol to see how this may improve her symptoms.  Start Medrol pack tomorrow for the next 6 days to try to reduce symptomatology related to neuritis.  Discussed holding off on gabapentin given untoward side effects suffered by her mother previously.  We could consider trying this 300 mg nightly in the future though.      Lastly if she has no improvement over the next 4 to 6 weeks, then consider MRI lumbar and possibly hip.      Follow-up in 4 to 6 weeks to see how her progress is coming along.    It was a pleasure seeing Renetta today.    50 minutes on date of the encounter doing chart review, history and examination, independent imaging review, documentation, and additional activities noted above.      Silver Cortez DO, CAQSM  Primary Care Sports Medicine

## 2022-11-28 ENCOUNTER — OFFICE VISIT (OUTPATIENT)
Dept: VASCULAR SURGERY | Facility: CLINIC | Age: 47
End: 2022-11-28
Payer: COMMERCIAL

## 2022-11-28 DIAGNOSIS — I78.1 SPIDER VEINS: Primary | ICD-10-CM

## 2022-11-28 PROCEDURE — S9999 SALES TAX: HCPCS | Performed by: INTERNAL MEDICINE

## 2022-11-28 PROCEDURE — 36468 NJX SCLRSNT SPIDER VEINS: CPT | Performed by: INTERNAL MEDICINE

## 2022-11-28 NOTE — PROGRESS NOTES
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 Lauren is a 47 year old woman who was evaluated in Vein Clinic. She has telangiectasias of the lower extremities bilaterally and desired sclerotherapy for management. This is her second session.  Details of the procedure including risks of bleeding, infection, ulceration, hyperpigmentation, deep vein thrombosis, and matting were discussed.  The patient understood and wished to proceed.  Informed consent was obtained.      Operative description:  She was positioned alternately supine and then prone on the procedure table.  Using lighted magnification, the spider veins were identified on the lower legs bilaterally.  Each syringe of 0.5% Polidocanol was foamed with 2 parts air and 1 part solution.  The skin overlying the telangiectasias and veins was swabbed with alcohol. A 30-gauge needle was used to access the veins of the leg and these were serially injected with the foamed 0.5% Polidocanol, until filling and blanching of the venous network was achieved. A total of 2 vials of Polidocanol were used.     Hemostasis was obtained with dry gauze and pressure. The legs were then cleaned with saline solution and dressed appropriately with compression stockings.        The patient was asked to walk about 10-15 mins prior to leaving the clinic to return home.  She will return to clinic in 4-6 weeks for a post-operative check.      Sclerotherapy    Date/Time: 11/28/2022 3:29 PM  Performed by: Candida Barreto MD  Authorized by: Candida Barreto MD     Type:  Cosmetic  Session:  Limited  Procedure side:  Bilateral  Solution/Amount:  .5 POLIDOCANOL  Patient tolerance:  Patient tolerated the procedure well with no immediate complications  Wrap/Hose:   John Barreto MD [facility]

## 2022-11-28 NOTE — LETTER
11/28/2022         RE: Renetta Lauren  [address]  [facility] [address]        Dear Colleague,    Thank you for referring your patient, Renetta Lauren, to the [facility] [place]. Please see a copy of my visit note below.        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 Lauren is a 47 year old woman who was evaluated in Vein Clinic. She has telangiectasias of the lower extremities bilaterally and desired sclerotherapy for management. Details of the procedure including risks of bleeding, infection, ulceration, hyperpigmentation, deep vein thrombosis, and matting were discussed.  The patient understood and wished to proceed.  Informed consent was obtained.      Operative description:  She was positioned alternately supine and then prone on the procedure table.  Using lighted magnification, the spider veins were identified on the lower legs bilaterally.  Each syringe of 0.5% Polidocanol was foamed with 2 parts air and 1 part solution.  The skin overlying the telangiectasias and veins was swabbed with alcohol. A 30-gauge needle was used to access the veins of the leg and these were serially injected with the foamed 0.5% Polidocanol, until filling and blanching of the venous network was achieved. A total of 4 vials of Polidocanol were used.     Hemostasis was obtained with dry gauze and pressure. The legs were then cleaned with saline solution and dressed appropriately with compression stockings.        The patient was asked to walk about 10-15 mins prior to leaving the clinic to return home.  She will return to clinic in 4-6 weeks for a post-operative check.      Sclerotherapy    Date/Time: 11/28/2022 3:29 PM  Performed by: Candida Barreto MD  Authorized by: Candida Barreto  MD RIZWANA     Type:  Cosmetic  Session:  Limited  Procedure side:  Bilateral  Solution/Amount:  .5 POLIDOCANOL  Patient tolerance:  Patient tolerated the procedure well with no immediate complications  Wrap/Hose:  John Barreto MD [facility]       Again, thank you for allowing me to participate in the care of your patient.        Sincerely,        Candida Barreto MD

## 2023-01-16 DIAGNOSIS — G43.009 MIGRAINE WITHOUT AURA AND WITHOUT STATUS MIGRAINOSUS, NOT INTRACTABLE: ICD-10-CM

## 2023-01-17 NOTE — TELEPHONE ENCOUNTER
Routing refill request to provider for review/approval because:  Patient has appointment on 1/19  Please address refill with appointment.  Flora WALTON RN

## 2023-01-18 RX ORDER — SUMATRIPTAN 50 MG/1
50 TABLET, FILM COATED ORAL
Qty: 12 TABLET | Refills: 0 | Status: SHIPPED | OUTPATIENT
Start: 2023-01-18 | End: 2023-01-19

## 2023-01-19 ENCOUNTER — ANCILLARY PROCEDURE (OUTPATIENT)
Dept: GENERAL RADIOLOGY | Facility: CLINIC | Age: 48
End: 2023-01-19
Attending: FAMILY MEDICINE
Payer: COMMERCIAL

## 2023-01-19 ENCOUNTER — OFFICE VISIT (OUTPATIENT)
Dept: FAMILY MEDICINE | Facility: CLINIC | Age: 48
End: 2023-01-19
Payer: COMMERCIAL

## 2023-01-19 VITALS
RESPIRATION RATE: 16 BRPM | SYSTOLIC BLOOD PRESSURE: 108 MMHG | HEART RATE: 56 BPM | TEMPERATURE: 98.9 F | BODY MASS INDEX: 19.86 KG/M2 | WEIGHT: 134.1 LBS | HEIGHT: 69 IN | DIASTOLIC BLOOD PRESSURE: 78 MMHG | OXYGEN SATURATION: 97 %

## 2023-01-19 DIAGNOSIS — Z12.11 SCREEN FOR COLON CANCER: ICD-10-CM

## 2023-01-19 DIAGNOSIS — Z12.31 ENCOUNTER FOR SCREENING MAMMOGRAM FOR MALIGNANT NEOPLASM OF BREAST: ICD-10-CM

## 2023-01-19 DIAGNOSIS — G89.29 CHRONIC RIGHT SHOULDER PAIN: Primary | ICD-10-CM

## 2023-01-19 DIAGNOSIS — M25.531 RIGHT WRIST PAIN: ICD-10-CM

## 2023-01-19 DIAGNOSIS — Z79.899 ENCOUNTER FOR LONG-TERM (CURRENT) USE OF MEDICATIONS: ICD-10-CM

## 2023-01-19 DIAGNOSIS — G89.29 CHRONIC RIGHT SHOULDER PAIN: ICD-10-CM

## 2023-01-19 DIAGNOSIS — M25.551 RIGHT HIP PAIN: ICD-10-CM

## 2023-01-19 DIAGNOSIS — M25.511 CHRONIC RIGHT SHOULDER PAIN: ICD-10-CM

## 2023-01-19 DIAGNOSIS — M25.511 CHRONIC RIGHT SHOULDER PAIN: Primary | ICD-10-CM

## 2023-01-19 DIAGNOSIS — G43.009 MIGRAINE WITHOUT AURA AND WITHOUT STATUS MIGRAINOSUS, NOT INTRACTABLE: ICD-10-CM

## 2023-01-19 LAB — CREAT UR-MCNC: 17 MG/DL

## 2023-01-19 PROCEDURE — 73030 X-RAY EXAM OF SHOULDER: CPT | Mod: TC | Performed by: RADIOLOGY

## 2023-01-19 PROCEDURE — 99214 OFFICE O/P EST MOD 30 MIN: CPT | Performed by: FAMILY MEDICINE

## 2023-01-19 PROCEDURE — 80307 DRUG TEST PRSMV CHEM ANLYZR: CPT | Performed by: FAMILY MEDICINE

## 2023-01-19 RX ORDER — TRAMADOL HYDROCHLORIDE 50 MG/1
50-75 TABLET ORAL
Qty: 45 TABLET | Refills: 3 | Status: CANCELLED | OUTPATIENT
Start: 2023-01-19

## 2023-01-19 RX ORDER — BISMUTH SUBSALICYLATE 262 MG
TABLET,CHEWABLE ORAL
COMMUNITY
Start: 2022-12-27

## 2023-01-19 RX ORDER — METHYLPREDNISOLONE 4 MG
TABLET, DOSE PACK ORAL
Qty: 21 TABLET | Refills: 0 | Status: SHIPPED | OUTPATIENT
Start: 2023-01-19 | End: 2023-03-13

## 2023-01-19 RX ORDER — SUMATRIPTAN 50 MG/1
50 TABLET, FILM COATED ORAL
Qty: 12 TABLET | Refills: 0 | Status: SHIPPED | OUTPATIENT
Start: 2023-01-19 | End: 2023-05-17

## 2023-01-19 RX ORDER — AZITHROMYCIN 500 MG/1
1 TABLET, FILM COATED ORAL
COMMUNITY
Start: 2022-12-27 | End: 2023-01-19

## 2023-01-19 ASSESSMENT — ANXIETY QUESTIONNAIRES
8. IF YOU CHECKED OFF ANY PROBLEMS, HOW DIFFICULT HAVE THESE MADE IT FOR YOU TO DO YOUR WORK, TAKE CARE OF THINGS AT HOME, OR GET ALONG WITH OTHER PEOPLE?: SOMEWHAT DIFFICULT
GAD7 TOTAL SCORE: 4
IF YOU CHECKED OFF ANY PROBLEMS ON THIS QUESTIONNAIRE, HOW DIFFICULT HAVE THESE PROBLEMS MADE IT FOR YOU TO DO YOUR WORK, TAKE CARE OF THINGS AT HOME, OR GET ALONG WITH OTHER PEOPLE: SOMEWHAT DIFFICULT
1. FEELING NERVOUS, ANXIOUS, OR ON EDGE: SEVERAL DAYS
5. BEING SO RESTLESS THAT IT IS HARD TO SIT STILL: NOT AT ALL
7. FEELING AFRAID AS IF SOMETHING AWFUL MIGHT HAPPEN: NOT AT ALL
4. TROUBLE RELAXING: SEVERAL DAYS
GAD7 TOTAL SCORE: 4
7. FEELING AFRAID AS IF SOMETHING AWFUL MIGHT HAPPEN: NOT AT ALL
6. BECOMING EASILY ANNOYED OR IRRITABLE: NOT AT ALL
GAD7 TOTAL SCORE: 4
3. WORRYING TOO MUCH ABOUT DIFFERENT THINGS: SEVERAL DAYS
2. NOT BEING ABLE TO STOP OR CONTROL WORRYING: SEVERAL DAYS

## 2023-01-19 ASSESSMENT — PATIENT HEALTH QUESTIONNAIRE - PHQ9
10. IF YOU CHECKED OFF ANY PROBLEMS, HOW DIFFICULT HAVE THESE PROBLEMS MADE IT FOR YOU TO DO YOUR WORK, TAKE CARE OF THINGS AT HOME, OR GET ALONG WITH OTHER PEOPLE: NOT DIFFICULT AT ALL
SUM OF ALL RESPONSES TO PHQ QUESTIONS 1-9: 2
SUM OF ALL RESPONSES TO PHQ QUESTIONS 1-9: 2

## 2023-01-19 ASSESSMENT — PAIN SCALES - GENERAL: PAINLEVEL: NO PAIN (1)

## 2023-01-19 NOTE — LETTER
[facility]  01/19/23  Patient: Renetta Lauren  YOB: 1975  Medical Record Number: [id number]                                                                                  Non-Opioid Controlled Substance Agreement    This is an agreement between you and your provider regarding safe and appropriate use of controlled substances prescribed by your care team. Controlled substances are?medicines that can cause physical and mental dependence (abuse).     There are strict laws about having and using these medicines. We here at [facility] are  committed to working with you in your efforts to get better. To support you in this work, we'll help you schedule regular office appointments for medicine refills. If we must cancel or change your appointment for any reason, we'll make sure you have enough medicine to last until your next appointment.     As a Provider, I will:     Listen carefully to your concerns while treating you with respect.     Recommend a treatment plan that I believe is in your best interest and may involve therapies other than medicine.      Talk with you often about the possible benefits and the risk of harm of any medicine that we prescribe for you.    Assess the safety of this medicine and check how well it works.      Provide a plan on how to taper (discontinue or go off) using this medicine if the decision is made to stop its use.      ::  As a Patient, I understand controlled substances:       Are prescribed by my care provider to help me function or work and manage my condition(s).?    Are strong medicines and can cause serious side effects.       Need to be taken exactly as prescribed.?Combining controlled substances with certain medicines or chemicals (such as illegal drugs, alcohol, sedatives, sleeping pills, and benzodiazepines) can be dangerous or even fatal.? If I stop taking my medicines suddenly, I may have severe withdrawal symptoms.     The risks,  benefits, and side effects of these medicine(s) were explained to me. I agree that:    1. I will take part in other treatments as advised by my care team. This may be psychiatry or counseling, physical therapy, behavioral therapy, group treatment or a referral to specialist.    2. I will keep all my appointments and understand this is part of the monitoring of controlled substances.?My care team may require an office visit for EVERY controlled substance refill. If I miss appointments or don t follow instructions, my care team may stop my medicine    3. I will take my medicines as prescribed. I will not change the dose or schedule unless my care team tells me to. There will be no refills if I run out early.      4. I may be asked to come to the clinic and complete a urine drug test or complete a pill count. If I don t give a urine sample or participate in a pill count, the care team may stop my medicine.    5. I will only receive controlled substance prescriptions from this clinic. If I am treated by another provider, I will tell them that I am taking controlled substances and that I have a treatment agreement with this provider. I will inform my [facility] care team within one business day if I am given a prescription for any controlled substance by another healthcare provider. My [facility] care team can contact other providers and pharmacists about my use of any medicines.    6. It is up to me to make sure that I don't run out of my medicines on weekends or holidays.?If my care team is willing to refill my prescription without a visit, I must request refills only during office hours. Refills may take up to 3 business days to process. I will use one pharmacy to fill all my controlled substance prescriptions. I will notify the clinic about any changes to my insurance or medicine availability.    7. I am responsible for my prescriptions. If the medicine/prescription is lost, stolen or destroyed, it will  not be replaced.?I also agree not to share controlled substance medicines with anyone.     8. I am aware I should not use any illegal or recreational drugs. I agree not to drink alcohol unless my care team says I can.     9. If I enroll in the [place] Medical Cannabis program, I will tell my care team before my next refill.    10. I will tell my care team right away if I become pregnant, have a new medical problem treated outside of my regular clinic, or have a change in my medicines.     11. I understand that this medicine can affect my thinking, judgment and reaction time.? Alcohol and drugs affect the brain and body, which can affect the safety of my driving. Being under the influence of alcohol or drugs can affect my decision-making, behaviors, personal safety and the safety of others. Driving while impaired (DWI) can occur if a person is driving, operating or in physical control of a car, motorcycle, boat, snowmobile, ATV, motorbike, off-road vehicle or any other motor vehicle ([place] Statute 169A.20). I understand the risk if I choose to drive or operate any vehicle or machinery.    I understand that if I do not follow any of the conditions above, my prescriptions or treatment may be stopped or changed.   I agree that my provider, clinic care team and pharmacy may work with any city, state or federal law enforcement agency that investigates the misuse, sale or other diversion of my controlled medicine. I will allow my provider to discuss my care with, or share a copy of, this agreement with any other treating provider, pharmacy or emergency room where I receive care.     I have read this agreement and have asked questions about anything I did not understand.    ________________________________________________________  Patient Signature - Renetta Lauren     ___________________                   Date     ________________________________________________________  Provider Signature - Kendall Garza MD        ___________________                   Date     ________________________________________________________  Witness Signature (required if provider not present while patient signing)          ___________________                   Date

## 2023-01-19 NOTE — PROGRESS NOTES
"  Assessment & Plan     Chronic right shoulder pain  Assessment: X-ray is consistent with moderate degenerative changes in the right shoulder. Conservative management advised. Advised her to return to clinic if pain persisted.   Plan:  - XR Shoulder Right G/E 3 Views; Future    Screen for colon cancer  - Colonoscopy Screening  Referral; Future    Right hip pain  - methylPREDNISolone (MEDROL DOSEPAK) 4 MG tablet therapy pack; Follow Package Directions    Migraine without aura and without status migrainosus, not intractable  - SUMAtriptan (IMITREX) 50 MG tablet; Take 1 tablet (50 mg) by mouth at onset of headache for migraine May repeat in 2 hours. Max 4 tablets/24 hours.    Right wrist pain  Chronic pain.     Encounter for long-term (current) use of medications  .  Patient was counseled extensively in the past and in today's visit regarding chronic opiate use.  She was seen and evaluated by the pain clinic and sport medicine in the past.  She was previously taking tramadol 2 tablets once daily and currently taking 1 to 1.5 tablets daily.  - [id number] - Urine Drug Confirmation Panel (Comprehensive); Future  - [id number] - Urine Drug Confirmation Panel (Comprehensive)    Encounter for screening mammogram for malignant neoplasm of breast  - MA Screening Digital Bilateral; Future    Return in about 6 months (around 7/19/2023) for Follow-up visit.    Kendall Garza MD  [facility]    Chencho Jackson is a 47 year old, presenting for the following health issues:  Headache      History of Present Illness       Headaches:   Since the patient's last clinic visit, headaches are: worsened  The patient is getting headaches:  3-4 times per months  She is not able to do normal daily activities when she has a migraine.  The patient is taking the following rescue/relief medications:  Sumatriptan (Imitrex)   Patient states \"I get some relief\" from the rescue/relief medications.   The patient is taking the " "following medications to prevent migraines:  No medications to prevent migraines  In the past 4 weeks, the patient has gone to an Urgent Care or Emergency Room 0 times times due to headaches.    Reason for visit:  Medications check for migraine and chronic pain    She eats 4 or more servings of fruits and vegetables daily.She consumes 1 sweetened beverage(s) daily.She exercises with enough effort to increase her heart rate 30 to 60 minutes per day.  She exercises with enough effort to increase her heart rate 5 days per week.     Today's PHQ-9         PHQ-9 Total Score: 2    PHQ-9 Q9 Thoughts of better off dead/self-harm past 2 weeks :   Not at all    How difficult have these problems made it for you to do your work, take care of things at home, or get along with other people: Not difficult at all  Today's ANDREW-7 Score: 4     Pinched L4-L5. Saw sport medicine and was told it might be a pinched nerve. Planning to see sport medicine and physical therapy.     Intermittent episodes of headaches. Patient reports that it might be related to PMS. Patient reports 5-6 episodes of migraine per month.  Had insomnia last year which could've effected her appetite.     Ulnar nerve pain diagnosed in the past.     Also, shoulder pain.     Review of Systems   Constitutional, HEENT, cardiovascular, pulmonary, gi and gu systems are negative, except as otherwise noted.      Objective    /78   Pulse 56   Temp 98.9  F (37.2  C) (Temporal)   Resp 16   Ht 1.75 m (5' 8.9\")   Wt 60.8 kg (134 lb 1.6 oz)   SpO2 97%   BMI 19.86 kg/m    Body mass index is 19.86 kg/m .  Physical Exam   GENERAL: healthy, alert and no distress  RESP: lungs clear to auscultation - no rales, rhonchi or wheezes  CV: regular rate and rhythm, normal S1 S2, no S3 or S4, no murmur, click or rub, no peripheral edema and peripheral pulses strong  MS: Range of motion in right shoulder is restricted by pain.     Results for orders placed or performed in visit on " 01/19/23   XR Shoulder Right G/E 3 Views     Status: None    Narrative    XR SHOULDER RIGHT G/E 3 VIEWS 1/19/2023 12:56 PM     HISTORY: Chronic right shoulder pain; Chronic right shoulder pain    COMPARISON: None.      Impression    IMPRESSION: Moderate degenerative changes in the glenohumeral joint.  Intra-articular bodies in the long head of the biceps tendon sheath.  Normal glenohumeral alignment. Mild degenerative changes in the  acromioclavicular joint. Subcentimeter benign osteochondroma in the  medial humeral metaphysis.     MIKE NEWELL MD         SYSTEM ID:  [id number]   Results for orders placed or performed in visit on 01/19/23   Urine Drug Confirmation Panel     Status: Abnormal   Result Value Ref Range    O-Cabrera-Tramadol ng/mL 1,720 (H) <50 ng/mL    O-Cabrera-Tramadol 10,118 Absent ng/mg [creat]    Tramadol ng/mL 566 (H) <50 ng/mL    Tramadol 3,329 Absent ng/mg [creat]    Narrative    Urine specimens with creatinine values less than 20 mg/dL suggest specimen adulteration by dilution.    This test was developed and its performance characteristics determined by the [facility],  [facility]. It has not been cleared or approved by the FDA. The laboratory is regulated under CLIA as qualified to perform high-complexity testing. This test is used for clinical purposes. It should not be regarded as investigational or for research.   Urine Creatinine for Drug Screen Panel     Status: None   Result Value Ref Range    Creatinine Urine for Drug Screen 17 mg/dL   [id number] - Urine Drug Confirmation Panel (Comprehensive)     Status: Abnormal    Narrative    The following orders were created for panel order [id number] - Urine Drug Confirmation Panel (Comprehensive).  Procedure                               Abnormality         Status                     ---------                               -----------         ------                     Urine Drug Confirmation ...[[id number]]   Abnormal            Final result               Urine Creatinine for Pawan...[[id number]]                      Final result                 Please view results for these tests on the individual orders.

## 2023-01-20 ENCOUNTER — HOSPITAL ENCOUNTER (OUTPATIENT)
Facility: CLINIC | Age: 48
End: 2023-01-20
Attending: COLON & RECTAL SURGERY | Admitting: COLON & RECTAL SURGERY
Payer: COMMERCIAL

## 2023-01-20 ENCOUNTER — TELEPHONE (OUTPATIENT)
Dept: GASTROENTEROLOGY | Facility: CLINIC | Age: 48
End: 2023-01-20
Payer: COMMERCIAL

## 2023-01-20 DIAGNOSIS — Z12.11 ENCOUNTER FOR SCREENING COLONOSCOPY: Primary | ICD-10-CM

## 2023-01-20 NOTE — TELEPHONE ENCOUNTER
"Screening Questions  BLUE  KIND OF PREP RED  LOCATION [review exclusion criteria] GREEN  SEDATION TYPE        Y Are you active on mychart?       SALIM Ordering/Referring Provider?        MEDICA What type of coverage do you have?      N Have you had a positive covid test in the last 14 days?     19.8 1. BMI  [BMI 40+ - review exclusion criteria]    Y  2. Are you able to give consent for your medical care? [IF NO,RN REVIEW]          N  3. Are you taking any prescription pain medications on a routine schedule   (ex narcotics: tramadol, oxycodone, roxicodone, oxycontin,  and percocet)?    TRAMADOL PRN,  NOT DAILY    3a. EXTENDED PREP What kind of prescription?     N 4. Do you have any chemical dependencies such as alcohol, street drugs, or methadone?        **If yes 3- 5 , please schedule with MAC sedation.**          IF YES TO ANY 6 - 10 - HOSPITAL SETTING ONLY.     N 6.   Do you need assistance transferring?     N 7.   Have you had a heart or lung transplant?    N 8.   Are you currently on dialysis?   N 9.   Do you use daily home oxygen?   N 10. Do you take nitroglycerin?   10a.  If yes, how often?     11. [FEMALES]  N Are you currently pregnant?    11a.  If yes, how many weeks? [ Greater than 12 weeks, OR NEEDED]    N 12. Do you have Pulmonary Hypertension? *NEED PAC APPT AT [facility]*     N 13. [review exclusion criteria]  Do you have any implantable devices in your body (pacemaker, defib, LVAD)?    N 14. In the past 6 months, have you had any heart related issues including cardiomyopathy or heart attack?     14a.  If yes, did it require cardiac stenting if so when?     N 15. Have you had a stroke or Transient ischemic attack (TIA - aka  mini stroke ) within 6 months?      N 16. Do you have mod to severe Obstructive Sleep Apnea?  [Hospital only]    N 17. Do you have SEVERE AND UNCONTROLLED asthma? *NEED PAC APPT AT [facility]*     N 18. Are you currently taking any blood thinners?     18a. If yes, inform patient to \"follow up " "w/ ordering provider for bridging instructions.\"    N 19. Do you take the medication Phentermine?    19a. If yes, \"Hold for 7 days before procedure.  Please consult your prescribing provider if you have questions about holding this medication.\"     N  20. Do you have chronic kidney disease?      N  21. Do you have a diagnosis of diabetes?     N  22. On a regular basis do you go 3-5 days between bowel movements?      23. Preferred LOCAL Pharmacy for Pre Prescription    [ LIST ONLY ONE PHARMACY]     [id number] - [address]      - CLOSING REMINDERS -    Informed patient they will need an adult    Cannot take any type of public or medical transportation alone    Conscious Sedation- Needs  for 6 hours after the procedure       MAC/General-Needs  for 24 hours after procedure    Pre-Procedure Covid test to be completed [[facility] PCR Testing Required]    Confirmed Nurse will call to complete assessment       - SCHEDULING DETAILS -  NO Hospital Setting Required? If yes, what is the exclusion?:    VLADIMIR  Surgeon    3/1  Date of Procedure  Lower Endoscopy [Colonoscopy]  Type of Procedure Scheduled  [facility]   [facility]-If you answer yes to questions #8, #20, #21Which Colonoscopy Prep was Sent?     CS Sedation Type     N PAC / Pre-op Required                 "

## 2023-01-23 LAB
N-NORTRAMADOL/CREAT UR CFM: ABNORMAL NG/MG {CREAT}
O-NORTRAMADOL UR CFM-MCNC: 1720 NG/ML
TRAMADOL CTO UR CFM-MCNC: 566 NG/ML
TRAMADOL/CREAT UR: 3329 NG/MG {CREAT}

## 2023-01-23 NOTE — RESULT ENCOUNTER NOTE
Dear Renetta,   Your results revealed moderate degenerative changes and intra-articular bodies. If the shoulder pain persisted, I would recommend proceeding with a shoulder MRI.  This will allow us to evaluate for any cartilage or tendon abnormalities.    Best Regards  Kendall Garza MD

## 2023-02-08 ENCOUNTER — TELEPHONE (OUTPATIENT)
Dept: FAMILY MEDICINE | Facility: CLINIC | Age: 48
End: 2023-02-08

## 2023-02-12 NOTE — TELEPHONE ENCOUNTER
Central Prior Authorization Team   Phone: [phone]    PA Initiation    Medication: tazarotene (TAZORAC) 0.05 % external cream  Insurance Company: [email or web address] - Phone [phone] Fax [phone]  Pharmacy Filling the Rx: [id number] - [place], [place] - [address] [place] [address]  Filling Pharmacy Phone: [phone]  Filling Pharmacy Fax:    Start Date: 2/12/2023

## 2023-02-13 NOTE — TELEPHONE ENCOUNTER
Prior Authorization Not Needed per Insurance    Medication: tazarotene (TAZORAC) 0.05 % external cream-PA NOT NEEDED  Insurance Company: [employer] - Phone [phone] Fax [phone]  Expected CoPay:  $10  Pharmacy Filling the Rx: [id number] - [place], [place] - [facility] [place]  Pharmacy Notified: Yes  Patient Notified: No    Insurance states that PA is Not Needed and medication is covered as noted below.  **Instructed pharmacy to notify patient when script is ready to /ship.** Called pharmacy and pharmacy stated that they have a paid claim on medication and will notify the patient when medication is ready for .

## 2023-02-22 RX ORDER — BISACODYL 5 MG
TABLET, DELAYED RELEASE (ENTERIC COATED) ORAL
Qty: 4 TABLET | Refills: 0 | Status: SHIPPED | OUTPATIENT
Start: 2023-02-22 | End: 2023-04-25 | Stop reason: HOSPADM

## 2023-02-28 ENCOUNTER — TELEPHONE (OUTPATIENT)
Dept: GASTROENTEROLOGY | Facility: CLINIC | Age: 48
End: 2023-02-28
Payer: COMMERCIAL

## 2023-02-28 NOTE — TELEPHONE ENCOUNTER
Caller: Renetta Lauren    Reason for Reschedule/Cancellation (please be detailed, any staff messages or encounters to note?): pt request      Prior to reschedule please review:    Ordering Provider:dino Guadarrama per order:cs    Does patient have any ASC Exclusions, please identify?: n      Notes on Cancelled Procedure:    Procedure:Lower Endoscopy [Colonoscopy]     Date: 03/01/2023    Location:[facility]; [address] [place] [address]    Surgeon: sang        Rescheduled: Yes    Procedure: Lower Endoscopy [Colonoscopy]     Date: 04/26/2023    Location:[facility]; [address] [place] [address]    Surgeon: sang    Sedation Level Scheduled  cs,  Reason for Sedation Level screening     Prep/Instructions updated and sent: raven

## 2023-03-13 DIAGNOSIS — M25.551 RIGHT HIP PAIN: ICD-10-CM

## 2023-03-13 DIAGNOSIS — M25.531 RIGHT WRIST PAIN: ICD-10-CM

## 2023-03-13 RX ORDER — TRAMADOL HYDROCHLORIDE 50 MG/1
50-75 TABLET ORAL
Qty: 45 TABLET | Refills: 4 | Status: SHIPPED | OUTPATIENT
Start: 2023-03-13 | End: 2023-08-02

## 2023-03-13 RX ORDER — METHYLPREDNISOLONE 4 MG/1
TABLET ORAL
Qty: 21 TABLET | Refills: 0 | Status: SHIPPED | OUTPATIENT
Start: 2023-03-13 | End: 2023-08-03

## 2023-03-13 NOTE — TELEPHONE ENCOUNTER
Triage,   Patient notes she did not refill her medication back in January when she last saw FS.   Called pharmacy and was told the Rxs were not sent to pharmacy.  Hoping to refill today if possible.   Thanks!  Madhuri MCDONNELL

## 2023-03-13 NOTE — TELEPHONE ENCOUNTER
Routing refill request to provider for review/approval because:  Drug not on the [facility] refill protocol see message    Shelby Barrett RN   [facility]

## 2023-04-12 ENCOUNTER — TELEPHONE (OUTPATIENT)
Dept: FAMILY MEDICINE | Facility: CLINIC | Age: 48
End: 2023-04-12
Payer: COMMERCIAL

## 2023-04-12 NOTE — TELEPHONE ENCOUNTER
Prior Authorization Retail Medication Request    Medication/Dose: traMADol (ULTRAM) 50 MG tablet  ICD code (if different than what is on RX): Right wrist pain [M25.531]   Previously Tried and Failed:  unknown  Rationale:  unknown    Insurance Name:  [employer] plus  Insurance ID:  [id number]

## 2023-04-16 NOTE — TELEPHONE ENCOUNTER
Central Prior Authorization Team   Phone: [phone]    PA Initiation    Medication: traMADol (ULTRAM) 50 MG tablet  Insurance Company:    Pharmacy Filling the Rx: [id number] - [place], [place] - [address] [place] [address]  Filling Pharmacy Phone: [phone]  Filling Pharmacy Fax: [phone]  Start Date: 4/16/2023

## 2023-05-16 DIAGNOSIS — G43.009 MIGRAINE WITHOUT AURA AND WITHOUT STATUS MIGRAINOSUS, NOT INTRACTABLE: ICD-10-CM

## 2023-05-17 RX ORDER — SUMATRIPTAN 50 MG/1
50 TABLET, FILM COATED ORAL
Qty: 12 TABLET | Refills: 0 | Status: SHIPPED | OUTPATIENT
Start: 2023-05-17 | End: 2023-09-20

## 2023-05-17 NOTE — TELEPHONE ENCOUNTER
Prescription approved per [facility] Refill Protocol.      Atif VILLATORO RN   [facility]

## 2023-05-23 NOTE — PROGRESS NOTES
Botulinum Injection Procedure Note:  Cosmetic    Dermatology Problem List:  1. Rhytides and history of migraines  - Botox 5/31/2023  - Tazorac  2. Sebaceous hyperplasia, right forehead  3. Family hx melanoma (mother)    Social Hx: works at the [employer]    HPI:  Ms. Renetta Lauren is a(n) 47 year old female who presents today as a new patient for cosmetic consult. Patient reports that she was waking up with migraines 1-2 times per week, and she started taping her face to prevent scowling at night, and she stopped waking up at night, and she stopped having headaches in the morning. She does not qualify for insurance coverage. She denies other history of neurologic disease. No prior Botox. She also reports a spot on the forehead that she would like examined. She uses Vitamin C serum in the morning and exfoliates daily. She also uses Tazorac. Reports family history of melanoma (mother). She has an IUD.    ATTENDING STAFF SURGEON: Dr. Kaelyn Elliott    NURSE: Paola Van RN     ANESTHESIA:   None    PREOPERATIVE DIAGNOSIS:   Rhytides    LOCATION: [facility]    LOT NO: [id number]    EXP DATE: 2025/12    NDC number : 5207-7196-48    OPERATION/PROCEDURE:   Intralesional botulinum toxin injection     Dilution with 0.6 preserved sterile normal saline in a 50 Unit Botox Vial.    Total units of botulinum toxin: 24    POSTOPERATIVE DIAGNOSIS:   SAME     PREPARATION:   Alcohol swab    DESCRIPTION OF OPERATION/PROCEDURE:   The nature and purpose of the procedure, associated risks including but not limited to bruising, headache or discomfort at the site(s), numbness, muscle twitching, brow or eyelid droop, headache, double vision, not enough effect or too much effect, difficulty whistling or drinking from a straw, loss of muscle tone, or infection. Possible consequences and complications, and alternative methods of treatment were explained in detail.   A signed informed operative consent was obtained.    The patient was taken to the  operative suite and properly positioned. The area to be treated was defined and confirmed by the patient and physician. The area for Botox injection was marked.    Cosmetic procedure: A total of 24 Units were injected into sites at the see above. The patient tolerated the procedure well and there were no complications noted. Patient was given wound care instructions.       # Family hx melanoma  - Recommended yearly skin exams    # Sebaceous hyperplasia, right forehead  - Discussed destruction with hyfrecator, but patient is not interested at this time.    Clinical Follow-Up: schedule for FBSE with any provider and follow-up with me in 6 months    Staff Involved:  Scribe/Staff    Scribe Disclosure:  I, James Olivera, am serving as a scribe to document services personally performed by Kaelyn Elliott MD based on data collection and the provider's statements to me.     Provider Disclosure:   The documentation recorded by the scribe accurately reflects the services I personally performed and the decisions made by me.    Kaelyn Elliott MD    Department of Dermatology  [facility]: Phone: [phone], Fax:[phone]  [facility]: Phone: [phone], Fax: [phone]

## 2023-05-31 ENCOUNTER — ANCILLARY PROCEDURE (OUTPATIENT)
Dept: MAMMOGRAPHY | Facility: CLINIC | Age: 48
End: 2023-05-31
Attending: FAMILY MEDICINE
Payer: COMMERCIAL

## 2023-05-31 ENCOUNTER — OFFICE VISIT (OUTPATIENT)
Dept: DERMATOLOGY | Facility: CLINIC | Age: 48
End: 2023-05-31
Payer: COMMERCIAL

## 2023-05-31 DIAGNOSIS — L98.8 RHYTIDES: ICD-10-CM

## 2023-05-31 DIAGNOSIS — L70.9 ADULT ACNE: ICD-10-CM

## 2023-05-31 DIAGNOSIS — Z80.8 FAMILY HX OF MELANOMA: ICD-10-CM

## 2023-05-31 DIAGNOSIS — L73.8 SEBACEOUS HYPERPLASIA: Primary | ICD-10-CM

## 2023-05-31 DIAGNOSIS — Z12.31 VISIT FOR SCREENING MAMMOGRAM: ICD-10-CM

## 2023-05-31 PROCEDURE — 96999 UNLISTED SPEC DERM SVC/PX: CPT | Performed by: DERMATOLOGY

## 2023-05-31 PROCEDURE — 77063 BREAST TOMOSYNTHESIS BI: CPT | Mod: GC | Performed by: RADIOLOGY

## 2023-05-31 PROCEDURE — 77067 SCR MAMMO BI INCL CAD: CPT | Mod: GC | Performed by: RADIOLOGY

## 2023-05-31 ASSESSMENT — PAIN SCALES - GENERAL: PAINLEVEL: NO PAIN (0)

## 2023-05-31 NOTE — NURSING NOTE
Dermatology Rooming Note    Renetta Lauren's goals for this visit include:   Chief Complaint   Patient presents with     Derm Problem     Renetta is here for face botox consultation from headaches. Has tried face tape. Also has a clogged pore in between eyes.      Maryellen Ferrer, visit facilitator

## 2023-05-31 NOTE — LETTER
5/31/2023       RE: Renetta Lauren  [address]  [facility] [address]     Dear Colleague,    Thank you for referring your patient, Renetta Lauren, to the [facility]. Please see a copy of my visit note below.    Botulinum Injection Procedure Note:  Cosmetic    Dermatology Problem List:  1. Rhytides and history of migraines  - Botox 5/31/2023  - Tazorac  2. Sebaceous hyperplasia, right forehead  3. Family hx melanoma (mother)    Social Hx: works at the [employer]    HPI:  Ms. Renetta Lauren is a(n) 47 year old female who presents today as a new patient for cosmetic consult. Patient reports that she was waking up with migraines 1-2 times per week, and she started taping her face to prevent scowling at night, and she stopped waking up at night, and she stopped having headaches in the morning. She does not qualify for insurance coverage. She denies other history of neurologic disease. No prior Botox. She also reports a spot on the forehead that she would like examined. She uses Vitamin C serum in the morning and exfoliates daily. She also uses Tazorac. Reports family history of melanoma (mother). She has an IUD.    ATTENDING STAFF SURGEON: Dr. Kaelyn Elliott    NURSE: Paola Van RN     ANESTHESIA:   None    PREOPERATIVE DIAGNOSIS:   Rhytides    LOCATION: [facility]    LOT NO: [id number]    EXP DATE: 2025/12    NDC number : 5279-8337-92    OPERATION/PROCEDURE:   Intralesional botulinum toxin injection     Dilution with 0.6 preserved sterile normal saline in a 50 Unit Botox Vial.    Total units of botulinum toxin: 24    POSTOPERATIVE DIAGNOSIS:   SAME     PREPARATION:   Alcohol swab    DESCRIPTION OF OPERATION/PROCEDURE:   The nature and purpose of the procedure, associated risks including but not limited to bruising, headache or discomfort at the site(s), numbness, muscle twitching, brow or eyelid droop, headache,  double vision, not enough effect or too much effect, difficulty whistling or drinking from a straw, loss of muscle tone, or infection. Possible consequences and complications, and alternative methods of treatment were explained in detail.   A signed informed operative consent was obtained.    The patient was taken to the operative suite and properly positioned. The area to be treated was defined and confirmed by the patient and physician. The area for Botox injection was marked.    Cosmetic procedure: A total of 24 Units were injected into sites at the see above. The patient tolerated the procedure well and there were no complications noted. Patient was given wound care instructions.       # Family hx melanoma  - Recommended yearly skin exams    # Sebaceous hyperplasia, right forehead  - Discussed destruction with hyfrecator, but patient is not interested at this time.    Clinical Follow-Up: schedule for FBSE with any provider and follow-up with me in 6 months    Staff Involved:  Scribe/Staff    Scribe Disclosure:  I, James Olivera, am serving as a scribe to document services personally performed by Kaelyn Elloitt MD based on data collection and the provider's statements to me.     Provider Disclosure:   The documentation recorded by the scribe accurately reflects the services I personally performed and the decisions made by me.    Kaelyn Elliott MD    Department of Dermatology  [facility]: Phone: [phone], Fax:[phone]  [facility]: Phone: [phone], Fax: [phone]

## 2023-05-31 NOTE — PATIENT INSTRUCTIONS
Botulinum Toxin (Botox/Dysport) Cosmetic Information    Risks: I will have pain, redness, and swelling. I may have bruising, headache or discomfort at the site(s). Risks are asymmetry, numbness, twitching, brow droop, eyelid droop, headache, double vision, not enough effect or too much effect, difficulty whistling or drinking, loss of muscle tone, headache or infection. A touch-up or multiple treatments may be required.    About Botulinum Toxin (Botox/Dysport)  You have inquired about Botox cosmetic. Botulinum toxin is a purified protein derivative developed from bacteria. It has the ability to immobilize facial muscles that create dynamic wrinkles. Dynamic wrinkles develop due to muscle contraction, and over time become permanent folds in the skin, even when the muscles are not flexed. The use of Botox results in a very pleasing cosmetic effect for many people, leading to a more youthful, relaxed appearance. Botox can be used in combination with injectable fillers, chemical peels, and laser resurfacing to treat deeper wrinkles. It also has become an accepted form of treatment for hyperhidrosis, (or excessive sweating) in people who have not responded to other therapies.     With my treatment side effects may include bruising, headache or discomfort at the site(s). Asymmetry may occur and a touch-up may be required. Risks of this procedure include numbness, muscle twitching, brow or eyelid droop, headache, double vision, not enough effect or too much effect, difficulty whistling or drinking from a straw, loss of muscle tone, headache or infection.    Post-Procedure Instructions:   Do not rub the treated area. Avoid exercise for the 24 hours following the procedure. You may move the muscles. Some people will experience bruising or eyelid ptosis (drooping) after injection. This is temporary and usually mild. Eyelid ptosis may be treated with special eye drops. Call your doctor if you have any questions or concerns  after your treatment.     Do not massage the area for 24 hours  Stay upright for 4 hours  Do not push on the injected area  Contact us if you have asymmetry at 2 weeks  Do not apply skin products to the area for 24 hours    Who should I call with questions?  [facility]: [phone]  [facility]: [phone]  For urgent needs outside of business hours call the [facility] at [phone] and ask for the resident on call  MyChart messaging may be delayed

## 2023-05-31 NOTE — NURSING NOTE
Drug Administration Record    Prior to injection, verified patient identity using patient's name and date of birth.  Due to injection administration, patient instructed to remain in clinic for 15 minutes  afterwards, and to report any adverse reaction to me immediately.    Drug Name: botox  Dose: 24 units  Route administered: ID  NDC #: 6128-6214-92  Amount of waste(mL): 26 units  Reason for waste: Single use vial  : [employer]  EXPIRATION DATE: 12/2025

## 2023-06-28 ENCOUNTER — OFFICE VISIT (OUTPATIENT)
Dept: MIDWIFE SERVICES | Facility: CLINIC | Age: 48
End: 2023-06-28
Payer: COMMERCIAL

## 2023-06-28 VITALS
WEIGHT: 134.8 LBS | SYSTOLIC BLOOD PRESSURE: 120 MMHG | HEIGHT: 69 IN | BODY MASS INDEX: 19.96 KG/M2 | HEART RATE: 59 BPM | OXYGEN SATURATION: 99 % | DIASTOLIC BLOOD PRESSURE: 90 MMHG

## 2023-06-28 DIAGNOSIS — Z11.3 SCREEN FOR STD (SEXUALLY TRANSMITTED DISEASE): Primary | ICD-10-CM

## 2023-06-28 LAB
C TRACH DNA SPEC QL NAA+PROBE: NEGATIVE
N GONORRHOEA DNA SPEC QL NAA+PROBE: NEGATIVE

## 2023-06-28 PROCEDURE — 99396 PREV VISIT EST AGE 40-64: CPT | Performed by: ADVANCED PRACTICE MIDWIFE

## 2023-06-28 PROCEDURE — 87491 CHLMYD TRACH DNA AMP PROBE: CPT | Performed by: ADVANCED PRACTICE MIDWIFE

## 2023-06-28 PROCEDURE — 87591 N.GONORRHOEAE DNA AMP PROB: CPT | Performed by: ADVANCED PRACTICE MIDWIFE

## 2023-06-28 NOTE — PROGRESS NOTES
Renetta is a 48 year old  female who presents for annual exam.     Menses are rare No LMP recorded. (Menstrual status: IUD).. Using IUD for contraception.  She is not currently considering pregnancy.  Besides routine health maintenance,  she would like to discuss spotting after sex.  GYNECOLOGIC HISTORY:  Menarche:   Renetta is sexually active with 1male partner(s) and is currently in monogamous relationship.    History sexually transmitted infections:No STD history  STI testing offered?  Accepted  JOANNE exposure: Unknown  History of abnormal Pap smear: NO - age 30-65 PAP every 5 years with negative HPV co-testing recommended  Family history of breast CA: Yes (Please explain): mgm  Family history of uterine/ovarian CA: No    Family history of colon CA: No    HEALTH MAINTENANCE:  Cholesterol: (  Cholesterol   Date Value Ref Range Status   2021 178 <200 mg/dL Final    History of abnormal lipids: No  Mammo: 23 . History of abnormal Mammo: No.  Regular Self Breast Exams: Yes  Calcium/Vitamin D intake: source:  dairy, dietary supplement(s) Adequate? Yes  TSH: (No results found for: TSH )  Pap; (  Lab Results   Component Value Date    PAP NIL 2021    PAP NIL 2016    PAP NIL 2011    )    HISTORY:  OB History    Para Term  AB Living   2 1 0 1 1 1   SAB IAB Ectopic Multiple Live Births   1 0 0 0 1      # Outcome Date GA Lbr Dustin/2nd Weight Sex Delivery Anes PTL Lv   2  10/29/15 34w1d 06:00 / 02:36 2.23 kg (4 lb 14.7 oz) M Vag-Spont EPI  BASIM      Apgar1: 8  Apgar5: 9   1 SAB              Past Medical History:   Diagnosis Date     Asthma      Cervical high risk HPV (human papillomavirus) test positive 05/15/2014    05/15/14     Depression ages 17-21    estrogen induced from OCP     Depressive disorder ages 17-21     History of blood transfusion      Miscarriage 2011    1st trimester     PPROM Elevated Risk Factor 2015    Elevated risk with PPROM via Dr Karlos MONGE with  persistent subchorionic hemorrhage.   Consult with patient on Betamethasone in 3rd trimester based on this risk unassociated with  ctx.    Please offer bethamethasone in 3rd trimester. (28 weeks)       Ulcer      UTI (lower urinary tract infection)      Past Surgical History:   Procedure Laterality Date     BIOPSY      dermatology     GENITOURINARY SURGERY      postpartum d&c     OPERATIVE HYSTEROSCOPY WITH MORCELLATOR N/A 2015    Procedure: OPERATIVE HYSTEROSCOPY WITH MORCELLATOR;  Surgeon: Reyna Marshall MD;  Location:  OR     C APPENDECTOMY       Z STOMACH SURGERY PROCEDURE UNLISTED      Appendectomy     Family History   Problem Relation Age of Onset     Family History Negative Other      Hypertension Mother      Other Cancer Mother         Melanoma at age 27     Cancer Mother      Family History Negative Father      Coronary Artery Disease Maternal Grandfather      Hypertension Maternal Grandfather      Breast Cancer Maternal Grandmother         Cancer occurred in her late 80s     Social History     Socioeconomic History     Marital status: Single     Spouse name: None     Number of children: 0     Years of education: None     Highest education level: None   Tobacco Use     Smoking status: Never     Smokeless tobacco: Never   Substance and Sexual Activity     Alcohol use: Yes     Comment: 1-4 glasses of wine per week     Drug use: Never     Sexual activity: Not Currently     Partners: Male     Birth control/protection: I.U.D.   Other Topics Concern     Parent/sibling w/ CABG, MI or angioplasty before 65F 55M? No   Social History Narrative        Works as a .        Social Documentation:        Balanced Diet: YES    Calcium intake: 2-3 per day    Caffeine: 2 per day    Exercise:  type of activity cardio;  3-4 times per week    Sunscreen: Yes    Seatbelts:  Yes    Self Breast Exam:  Yes    Self Testicular Exam: na    Physical/Emotional/Sexual Abuse: No    Do you feel  safe in your environment? Yes        Cholesterol screen up to date: fam seema, had it checked 2 years ago    Eye Exam up to date: Yes    Dental Exam up to date: Yes    Pap smear up to date: Yes, do today.  Last was 06/08, nml    Mammogram up to date: Does Not Apply    Dexa Scan up to date: Does Not Apply    Colonoscopy up to date: Does Not Apply    Immunizations up to date: Yes    Glucose screen if over 40:  na                   Current Outpatient Medications:      [employer] BISMUTH 262 MG chewable tablet, PLEASE SEE ATTACHED FOR DETAILED DIRECTIONS (Patient not taking: Reported on 5/31/2023), Disp: , Rfl:      fluticasone (FLONASE) 50 MCG/ACT nasal spray, Spray 1 spray into both nostrils daily, Disp: 9.9 mL, Rfl: 3     methylPREDNISolone (MEDROL DOSEPAK) 4 MG tablet therapy pack, Follow Package Directions (Patient not taking: Reported on 5/31/2023), Disp: 21 tablet, Rfl: 0     SUMAtriptan (IMITREX) 50 MG tablet, Take 1 tablet (50 mg) by mouth at onset of headache for migraine May repeat in 2 hours. Max 4 tablets/24 hours., Disp: 12 tablet, Rfl: 0     tazarotene (TAZORAC) 0.05 % external cream, Externally apply topically At Bedtime Apply to chest and upper back. Stop using if the skin becomes too dry., Disp: 60 g, Rfl: 3     traMADol (ULTRAM) 50 MG tablet, Take 1-1.5 tablets (50-75 mg) by mouth nightly as needed (for wrist and shoulder pain), Disp: 45 tablet, Rfl: 4    Current Facility-Administered Medications:      levonorgestrel (KYLEENA) 19.5 MG IUD 19.5 mg, 1 each, Intrauterine, Once, Reyna Marshall MD     Allergies   Allergen Reactions     Ragweeds        Past medical, surgical, social and family history were reviewed and updated in EPIC.    ROS:   C:     NEGATIVE for fever, chills, change in weight  I:       NEGATIVE for worrisome rashes, moles or lesions  E:     NEGATIVE for vision changes or irritation  E/M: NEGATIVE for ear, mouth and throat problems  R:     NEGATIVE for significant cough or SOB  CV:    "NEGATIVE for chest pain, palpitations or peripheral edema  GI:     NEGATIVE for nausea, abdominal pain, heartburn, or change in bowel habits  :   NEGATIVE for frequency, dysuria, hematuria, vaginal discharge, or irregular bleeding  M:     NEGATIVE for significant arthralgias or myalgia  N:      NEGATIVE for weakness, dizziness or paresthesias  E:      NEGATIVE for temperature intolerance, skin/hair changes  P:      NEGATIVE for changes in mood or affect.    EXAM:  Pulse 59   Ht 1.75 m (5' 8.9\")   Wt 61.1 kg (134 lb 12.8 oz)   SpO2 99%   BMI 19.97 kg/m     BMI: Body mass index is 19.97 kg/m .  Constitutional: healthy, alert and no distress  Head: Normocephalic. No masses, lesions, tenderness or abnormalities  Neck: Neck supple. Trachea midline. No adenopathy. Thyroid symmetric, normal size.   Cardiovascular: RRR.   Respiratory: Negative.   Breast: Breasts reveal mild symmetric fibrocystic densities, but there are no dominant, discrete, fixed or suspicious masses found.  Gastrointestinal: Abdomen soft, non-tender, non-distended. No masses, organomegaly.  :  Vulva:  No external lesions, normal female hair distribution, no inguinal adenopathy.    Urethra:  Midline, non-tender, well supported, no discharge  Vagina:  Moist, pink, no abnormal discharge, no lesions  Uterus:  Normal size, AV , non-tender, freely mobile  Ovaries:  No masses appreciated, non-tender, mobile  Rectal Exam: deferred  Musculoskeletal: extremities normal  Skin: no suspicious lesions or rashes  Psychiatric: Affect appropriate, cooperative,mentation appears normal.     Most Recent 3 CBC's:  Recent Labs   Lab Test 05/12/22  1156 11/23/15  1518 10/29/15  0353 10/06/15  1102   WBC 5.3  --  12.1* 7.7   HGB 13.2 11.6* 11.7 11.2*   MCV 97  --  96 94     --  134* 138*     Most Recent 2 LFT's:  Recent Labs   Lab Test 05/12/22  1156 10/06/15  1102   AST 14 9   ALT 22 22   ALKPHOS 42  --    BILITOTAL 0.4  --      Most Recent Cholesterol " Panel:  Recent Labs   Lab Test 06/14/21  0920   CHOL 178   LDL 94   HDL 75   TRIG 46     Most Recent TSH and T4:     COUNSELING:   Reviewed preventive health counseling, as reflected in patient instructions       Regular exercise       Healthy diet/nutrition       Contraception       Colorectal Cancer Screening       (Nichoel)menopause management   reports that she has never smoked. She has never used smokeless tobacco.    Body mass index is 19.97 kg/m .    FRAX Risk Assessment    ASSESSMENT:  48 year old female with satisfactory annual exam  (Z11.3) Screen for STD (sexually transmitted disease)  (primary encounter diagnosis)  Comment:   Plan: NEISSERIA GONORRHOEA PCR, CHLAMYDIA TRACHOMATIS        PCR

## 2023-07-27 ENCOUNTER — TELEPHONE (OUTPATIENT)
Dept: FAMILY MEDICINE | Facility: CLINIC | Age: 48
End: 2023-07-27

## 2023-07-27 ENCOUNTER — LAB (OUTPATIENT)
Dept: LAB | Facility: CLINIC | Age: 48
End: 2023-07-27
Payer: COMMERCIAL

## 2023-07-27 DIAGNOSIS — F11.90 CHRONIC, CONTINUOUS USE OF OPIOIDS: Primary | ICD-10-CM

## 2023-07-27 DIAGNOSIS — F11.90 CHRONIC, CONTINUOUS USE OF OPIOIDS: ICD-10-CM

## 2023-07-27 PROCEDURE — 80367 DRUG SCREENING PROPOXYPHENE: CPT

## 2023-07-27 PROCEDURE — 80324 DRUG SCREEN AMPHETAMINES 1/2: CPT

## 2023-07-27 PROCEDURE — 80373 DRUG SCREENING TRAMADOL: CPT

## 2023-07-27 PROCEDURE — 80355 GABAPENTIN NON-BLOOD: CPT

## 2023-07-27 PROCEDURE — 80353 DRUG SCREENING COCAINE: CPT

## 2023-07-27 PROCEDURE — 80357 KETAMINE AND NORKETAMINE: CPT

## 2023-07-27 PROCEDURE — 80356 HEROIN METABOLITE: CPT

## 2023-07-27 PROCEDURE — 80359 METHYLENEDIOXYAMPHETAMINES: CPT

## 2023-07-27 PROCEDURE — 80346 BENZODIAZEPINES1-12: CPT

## 2023-07-27 PROCEDURE — 80365 DRUG SCREENING OXYCODONE: CPT

## 2023-07-27 PROCEDURE — 83992 ASSAY FOR PHENCYCLIDINE: CPT

## 2023-07-27 PROCEDURE — 80354 DRUG SCREENING FENTANYL: CPT

## 2023-07-27 PROCEDURE — 80361 OPIATES 1 OR MORE: CPT

## 2023-07-27 PROCEDURE — 80348 DRUG SCREENING BUPRENORPHINE: CPT

## 2023-07-27 PROCEDURE — 80363 OPIOIDS & OPIATE ANALOGS 3/4: CPT

## 2023-07-27 PROCEDURE — 80360 METHYLPHENIDATE: CPT

## 2023-07-27 PROCEDURE — 80372 DRUG SCREENING TAPENTADOL: CPT

## 2023-07-27 PROCEDURE — 80366 DRUG SCREENING PREGABALIN: CPT

## 2023-07-27 PROCEDURE — 80358 DRUG SCREENING METHADONE: CPT

## 2023-07-27 NOTE — TELEPHONE ENCOUNTER
Called patient.  Will come in for lab this afternoon.  Changed 8/3 VV to OV    Devika Canseco RN

## 2023-07-27 NOTE — TELEPHONE ENCOUNTER
FS,      Tramadol:    Patient called.  States she tried to schedule appointment with you for refill but your schedule is full.  States she was told she needed urine drug test for next refill.    States will run out of medication on Sunday.  Do you want to have her do lab only for urine test then VV with her after results in?    Last OV: 1/19/23      Devika Canseco RN

## 2023-07-27 NOTE — TELEPHONE ENCOUNTER
Test ordered. Can she drop off a sample today or tomorrow so that we can send her medication before the weekend?  I would like to see her for an office visit on 08/03/23. Can we switch it to an office visit.   MD Kayla

## 2023-07-28 LAB — CREAT UR-MCNC: 9 MG/DL

## 2023-07-31 ENCOUNTER — MYC MEDICAL ADVICE (OUTPATIENT)
Dept: FAMILY MEDICINE | Facility: CLINIC | Age: 48
End: 2023-07-31

## 2023-07-31 DIAGNOSIS — M25.531 RIGHT WRIST PAIN: ICD-10-CM

## 2023-08-01 NOTE — TELEPHONE ENCOUNTER
FS,      Please see [employer] message.  Drug urine test done - still in process    Note from 7/27 TE:  Kendall Garza MD     FS    7/27/23 12:42 PM  Note  Test ordered. Can she drop off a sample today or tomorrow so that we can send her medication before the weekend?  I would like to see her for an office visit on 08/03/23. Can we switch it to an office visit.   MD Kayla        8/3 visit was changed to OV.    Thank you,  Devika Canseco RN

## 2023-08-02 LAB
N-NORTRAMADOL/CREAT UR CFM: 7244 NG/MG {CREAT}
O-NORTRAMADOL UR CFM-MCNC: 652 NG/ML
TRAMADOL CTO UR CFM-MCNC: 157 NG/ML
TRAMADOL/CREAT UR: 1744 NG/MG {CREAT}

## 2023-08-02 RX ORDER — TRAMADOL HYDROCHLORIDE 50 MG/1
50-75 TABLET ORAL
Qty: 45 TABLET | Refills: 0 | Status: SHIPPED | OUTPATIENT
Start: 2023-08-02 | End: 2023-08-03

## 2023-08-03 ENCOUNTER — OFFICE VISIT (OUTPATIENT)
Dept: FAMILY MEDICINE | Facility: CLINIC | Age: 48
End: 2023-08-03
Payer: COMMERCIAL

## 2023-08-03 VITALS
HEART RATE: 62 BPM | SYSTOLIC BLOOD PRESSURE: 118 MMHG | RESPIRATION RATE: 16 BRPM | TEMPERATURE: 98 F | HEIGHT: 69 IN | BODY MASS INDEX: 20.38 KG/M2 | OXYGEN SATURATION: 98 % | WEIGHT: 137.56 LBS | DIASTOLIC BLOOD PRESSURE: 84 MMHG

## 2023-08-03 DIAGNOSIS — M51.369 DDD (DEGENERATIVE DISC DISEASE), LUMBAR: ICD-10-CM

## 2023-08-03 DIAGNOSIS — F11.90 CHRONIC, CONTINUOUS USE OF OPIOIDS: ICD-10-CM

## 2023-08-03 DIAGNOSIS — M24.011 LOOSE BODY IN SHOULDER JOINT, RIGHT: Primary | ICD-10-CM

## 2023-08-03 DIAGNOSIS — M25.531 RIGHT WRIST PAIN: ICD-10-CM

## 2023-08-03 PROCEDURE — 99214 OFFICE O/P EST MOD 30 MIN: CPT | Performed by: FAMILY MEDICINE

## 2023-08-03 RX ORDER — TRAMADOL HYDROCHLORIDE 50 MG/1
50-75 TABLET ORAL
Qty: 45 TABLET | Refills: 3 | Status: SHIPPED | OUTPATIENT
Start: 2023-08-03 | End: 2023-12-13

## 2023-08-03 ASSESSMENT — PAIN SCALES - GENERAL: PAINLEVEL: NO PAIN (1)

## 2023-08-03 ASSESSMENT — ANXIETY QUESTIONNAIRES
7. FEELING AFRAID AS IF SOMETHING AWFUL MIGHT HAPPEN: NOT AT ALL
3. WORRYING TOO MUCH ABOUT DIFFERENT THINGS: NOT AT ALL
GAD7 TOTAL SCORE: 2
1. FEELING NERVOUS, ANXIOUS, OR ON EDGE: SEVERAL DAYS
4. TROUBLE RELAXING: SEVERAL DAYS
IF YOU CHECKED OFF ANY PROBLEMS ON THIS QUESTIONNAIRE, HOW DIFFICULT HAVE THESE PROBLEMS MADE IT FOR YOU TO DO YOUR WORK, TAKE CARE OF THINGS AT HOME, OR GET ALONG WITH OTHER PEOPLE: NOT DIFFICULT AT ALL
6. BECOMING EASILY ANNOYED OR IRRITABLE: NOT AT ALL
GAD7 TOTAL SCORE: 2
2. NOT BEING ABLE TO STOP OR CONTROL WORRYING: NOT AT ALL
5. BEING SO RESTLESS THAT IT IS HARD TO SIT STILL: NOT AT ALL

## 2023-08-03 ASSESSMENT — ENCOUNTER SYMPTOMS
WEAKNESS: 0
NUMBNESS: 0

## 2023-08-03 NOTE — PROGRESS NOTES
Assessment & Plan     Renetta was seen today for pain.    Diagnoses and all orders for this visit:    Loose body in shoulder joint, right  Results from recent x-ray were discussed with the patient.  She will check with her insurance prior to proceeding with an MRI of the right shoulder.  -     MR Shoulder Right w/o Contrast; Future  -     traMADol (ULTRAM) 50 MG tablet; Take 1-1.5 tablets (50-75 mg) by mouth nightly as needed (for wrist and shoulder pain)    DDD (degenerative disc disease), lumbar  Chronic persistent lower back pain.  X-ray results were discussed with the patient.  Advised to check with her insurance prior to proceeding with an MRI.  -     MR Lumbar Spine w/o Contrast; Future  -     traMADol (ULTRAM) 50 MG tablet; Take 1-1.5 tablets (50-75 mg) by mouth nightly as needed (for wrist and shoulder pain)    Right wrist pain  Improved significantly since our last visit.-     traMADol (ULTRAM) 50 MG tablet; Take 1-1.5 tablets (50-75 mg) by mouth nightly as needed (for wrist and shoulder pain)    Chronic, continuous use of opioids  -     traMADol (ULTRAM) 50 MG tablet; Take 1-1.5 tablets (50-75 mg) by mouth nightly as needed (for wrist and shoulder pain)      Follow-up in 3 months.  Physical examination plus medication refill    Kendall Garza MD  [facility]   Renetta is a 48 year old, presenting for the following health issues:  Pain        8/3/2023    11:38 AM   Additional Questions   Roomed by wang minaya       Musculoskeletal Problem  This is a chronic problem. The current episode started more than 1 year ago. The problem has been waxing and waning (50 percent better. not waking up due to pain). Pertinent negatives include no numbness or weakness. She has tried rest, sleep and relaxation for the symptoms. The treatment provided significant relief.   History of Present Illness       Reason for visit:  Joint and nerve pain    She eats 4 or more servings of fruits and  "vegetables daily.She consumes 0 sweetened beverage(s) daily.She exercises with enough effort to increase her heart rate 30 to 60 minutes per day.  She exercises with enough effort to increase her heart rate 6 days per week. She is missing 1 dose(s) of medications per week.     Wrist pain is almost resolved.   Right shoulder and right hip are still painful. Improved after she started stretching exercises prior to bedtime.       Wt Readings from Last 4 Encounters:   08/03/23 62.4 kg (137 lb 9 oz)   06/28/23 61.1 kg (134 lb 12.8 oz)   01/19/23 60.8 kg (134 lb 1.6 oz)   11/17/22 62.6 kg (137 lb 14.4 oz)            Review of Systems   Neurological:  Negative for weakness and numbness.      Constitutional, HEENT, cardiovascular, pulmonary, gi and gu systems are negative, except as otherwise noted.      Objective    /84   Pulse 62   Temp 98  F (36.7  C) (Temporal)   Resp 16   Ht 1.753 m (5' 9\")   Wt 62.4 kg (137 lb 9 oz)   SpO2 98%   BMI 20.31 kg/m    Body mass index is 20.31 kg/m .  Physical Exam   GENERAL: healthy, alert and no distress  NECK: no adenopathy, no asymmetry, masses, or scars and thyroid normal to palpation  RESP: lungs clear to auscultation - no rales, rhonchi or wheezes  CV: regular rate and rhythm, normal S1 S2, no S3 or S4, no murmur, click or rub, no peripheral edema and peripheral pulses strong  ABDOMEN: soft, nontender, no hepatosplenomegaly, no masses and bowel sounds normal  MS: no gross musculoskeletal defects noted, no edema                  "

## 2023-08-03 NOTE — LETTER
Opioid / Opioid Plus Controlled Substance Agreement    This is an agreement between you and your provider about the safe and appropriate use of controlled substance/opioids prescribed by your care team. Controlled substances are medicines that can cause physical and mental dependence (abuse).    There are strict laws about having and using these medicines. We here at [facility] are committing to working with you in your efforts to get better. To support you in this work, we ll help you schedule regular office appointments for medicine refills. If we must cancel or change your appointment for any reason, we ll make sure you have enough medicine to last until your next appointment.     As a Provider, I will:  Listen carefully to your concerns and treat you with respect.   Recommend a treatment plan that I believe is in your best interest. This plan may involve therapies other than opioid pain medication.   Talk with you often about the possible benefits, and the risk of harm of any medicine that we prescribe for you.   Provide a plan on how to taper (discontinue or go off) using this medicine if the decision is made to stop its use.    As a Patient, I understand that opioid(s):   Are a controlled substance prescribed by my care team to help me function or work and manage my condition(s).   Are strong medicines and can cause serious side effects such as:  Drowsiness, which can seriously affect my driving ability  A lower breathing rate, enough to cause death  Harm to my thinking ability   Depression   Abuse of and addiction to this medicine  Need to be taken exactly as prescribed. Combining opioids with certain medicines or chemicals (such as illegal drugs, sedatives, sleeping pills, and benzodiazepines) can be dangerous or even fatal. If I stop opioids suddenly, I may have severe withdrawal symptoms.  Do not work for all types of pain nor for all patients. If they re not helpful, I may be asked to stop  them.        The risks, benefits and side effects of these medicine(s) were explained to me. I agree that:  I will take part in other treatments as advised by my care team. This may be psychiatry or counseling, physical therapy, behavioral therapy, group treatment or a referral to a specialist.     I will keep all my appointments. I understand that this is part of the monitoring of opioids. My care team may require an office visit for EVERY opioid/controlled substance refill. If I miss appointments or don t follow instructions, my care team may stop my medicine.    I will take my medicines as prescribed. I will not change the dose or schedule unless my care team tells me to. There will be no refills if I run out early.     I may be asked to come to the clinic and complete a urine drug test or complete a pill count at any time. If I don t give a urine sample or participate in a pill count, the care team may stop my medicine.    I will only receive prescriptions from this clinic for chronic pain. If I am treated by another provider for acute pain issues, I will tell them that I am taking opioid pain medication for chronic pain and that I have a treatment agreement with this provider. I will inform my [facility] care team within one business day if I am given a prescription for any pain medication by another healthcare provider. My [facility] care team can contact other providers and pharmacists about my use of any medicines.    It is up to me to make sure that I don t run out of my medicines on weekends or holidays. If my care team is willing to refill my opioid prescription without a visit, I must request refills only during office hours. Refills may take up to 3 business days to process. I will use one pharmacy to fill all my opioid and other controlled substance prescriptions. I will notify the clinic about any changes to my insurance or medication availability.    I am responsible for my  prescriptions. If the medicine/prescription is lost, stolen or destroyed, it will not be replaced. I also agree not to share controlled substance medicines with anyone.    I am aware I should not use any illegal or recreational drugs. I agree not to drink alcohol unless my care team says I can.       If I enroll in the [place] [facility] Cannabis program, I will tell my care team prior to my next refill.     I will tell my care team right away if I become pregnant, have a new medical problem treated outside of my regular clinic, or have a change in my medications.    I understand that this medicine can affect my thinking, judgment and reaction time. Alcohol and drugs affect the brain and body, which can affect the safety of my driving. Being under the influence of alcohol or drugs can affect my decision-making, behaviors, personal safety, and the safety of others. Driving while impaired (DWI) can occur if a person is driving, operating, or in physical control of a car, motorcycle, boat, snowmobile, ATV, motorbike, off-road vehicle, or any other motor vehicle ([place] Statute 169A.20). I understand the risk if I choose to drive or operate any vehicle or machinery.    I understand that if I do not follow any of the conditions above, my prescriptions or treatment may be stopped or changed.          Opioids  What You Need to Know    What are opioids?   Opioids are pain medicines that must be prescribed by a doctor. They are also known as narcotics.     Examples are:   morphine (MS Contin, Patti)  oxycodone (Oxycontin)  oxycodone and acetaminophen (Percocet)  hydrocodone and acetaminophen (Vicodin, Norco)   fentanyl patch (Duragesic)   hydromorphone (Dilaudid)   methadone  codeine (Tylenol #3)     What do opioids do well?   Opioids are best for severe short-term pain such as after a surgery or injury. They may work well for cancer pain. They may help some people with long-lasting (chronic) pain.     What do opioids NOT do  well?   Opioids never get rid of pain entirely, and they don t work well for most patients with chronic pain. Opioids don t reduce swelling, one of the causes of pain.                                    Other ways to manage chronic pain and improve function include:     Treat the health problem that may be causing pain  Anti-inflammation medicines, which reduce swelling and tenderness, such as ibuprofen (Advil, Motrin) or naproxen (Aleve)  Acetaminophen (Tylenol)  Antidepressants and anti-seizure medicines, especially for nerve pain  Topical treatments such as patches or creams  Injections or nerve blocks  Chiropractic or osteopathic treatment  Acupuncture, massage, deep breathing, meditation, visual imagery, aromatherapy  Use heat or ice at the pain site  Physical therapy   Exercise  Stop smoking  Take part in therapy       Risks and side effects     Talk to your doctor before you start or decide to keep taking opioids. Possible side effects include:    Lowering your breathing rate enough to cause death  Overdose, including death, especially if taking higher than prescribed doses  Worse depression symptoms; less pleasure in things you usually enjoy  Feeling tired or sluggish  Slower thoughts or cloudy thinking  Being more sensitive to pain over time; pain is harder to control  Trouble sleeping or restless sleep  Changes in hormone levels (for example, less testosterone)  Changes in sex drive or ability to have sex  Constipation  Unsafe driving  Itching and sweating  Dizziness  Nausea, throwing up and dry mouth    What else should I know about opioids?    Opioids may lead to dependence, tolerance, or addiction.    Dependence means that if you stop or reduce the medicine too quickly, you will have withdrawal symptoms. These include loose poop (diarrhea), jitters, flu-like symptoms, nervousness and tremors. Dependence is not the same as addiction.                     Tolerance means needing higher doses over time to  get the same effect. This may increase the chance of serious side effects.    Addiction is when people improperly use a substance that harms their body, their mind or their relations with others. Use of opiates can cause a relapse of addiction if you have a history of drug or alcohol abuse.    People who have used opioids for a long time may have a lower quality of life, worse depression, higher levels of pain and more visits to doctors.    You can overdose on opioids. Take these steps to lower your risk of overdose:    Recognize the signs:  Signs of overdose include decrease or loss of consciousness (blackout), slowed breathing, trouble waking up and blue lips. If someone is worried about overdose, they should call 911.    Talk to your doctor about Narcan (naloxone).   If you are at risk for overdose, you may be given a prescription for Narcan. This medicine very quickly reverses the effects of opioids.   If you overdose, a friend or family member can give you Narcan while waiting for the ambulance. They need to know the signs of overdose and how to give Narcan.     Don't use alcohol or street drugs.   Taking them with opioids can cause death.    Do not take any of these medicines unless your doctor says it s OK. Taking these with opioids can cause death:  Benzodiazepines, such as lorazepam (Ativan), alprazolam (Xanax) or diazepam (Valium)  Muscle relaxers, such as cyclobenzaprine (Flexeril)  Sleeping pills like zolpidem (Ambien)   Other opioids      How to keep you and other people safe while taking opioids:    Never share your opioids with others.  Opioid medicines are regulated by the Drug Enforcement Agency (HAZEL). Selling or sharing medications is a criminal act.    2. Be sure to store opioids in a secure place, locked up if possible. Young children can easily swallow them and overdose.    3. When you are traveling with your medicines, keep them in the original bottles. If you use a pill box, be sure you also  carry a copy of your medicine list from your clinic or pharmacy.    4. Safe disposal of opioids    Most pharmacies have places to get rid of medicine, called disposal kiosks. Medicine disposal options are also available in every [facility]. Search your county and  medication disposal  to find more options. You can find more details at:  [email or web address]-[email or web address]-medications     I agree that my provider, clinic care team, and pharmacy may work with any city, state or federal law enforcement agency that investigates the misuse, sale, or other diversion of my controlled medicine. I will allow my provider to discuss my care with, or share a copy of, this agreement with any other treating provider, pharmacy or emergency room where I receive care.    I have read this agreement and have asked questions about anything I did not understand.    _______________________________________________________  Patient Signature - Renetta Lauren _____________________                   Date     _______________________________________________________  Provider Signature - Kendall Garza MD   _____________________                   Date     _______________________________________________________  Witness Signature (required if provider not present while patient signing)   _____________________                   Date

## 2023-09-12 ENCOUNTER — IMMUNIZATION (OUTPATIENT)
Dept: PEDIATRICS | Facility: CLINIC | Age: 48
End: 2023-09-12
Payer: COMMERCIAL

## 2023-09-12 PROCEDURE — 90471 IMMUNIZATION ADMIN: CPT

## 2023-09-12 PROCEDURE — 90686 IIV4 VACC NO PRSV 0.5 ML IM: CPT

## 2023-09-20 DIAGNOSIS — G43.009 MIGRAINE WITHOUT AURA AND WITHOUT STATUS MIGRAINOSUS, NOT INTRACTABLE: ICD-10-CM

## 2023-09-21 RX ORDER — SUMATRIPTAN 50 MG/1
50 TABLET, FILM COATED ORAL
Qty: 12 TABLET | Refills: 0 | Status: SHIPPED | OUTPATIENT
Start: 2023-09-21 | End: 2023-10-26

## 2023-09-21 NOTE — TELEPHONE ENCOUNTER
"Requested Prescriptions   Pending Prescriptions Disp Refills    SUMAtriptan (IMITREX) 50 MG tablet 12 tablet 0     Sig: Take 1 tablet (50 mg) by mouth at onset of headache for migraine May repeat in 2 hours. Max 4 tablets/24 hours.       Serotonin Agonists Failed - 9/20/2023 10:54 AM        Failed - Serotonin Agonist request needs review.     Please review patient's record. If patient has had 8 or more treatments in the past month, please forward to provider.          Passed - Blood pressure under 140/90 in past 12 months     BP Readings from Last 3 Encounters:   08/03/23 118/84   06/28/23 (!) 120/90   01/19/23 108/78                 Passed - Recent (12 mo) or future (30 days) visit within the authorizing provider's specialty     Patient has had an office visit with the authorizing provider or a provider within the authorizing providers department within the previous 12 mos or has a future within next 30 days. See \"Patient Info\" tab in inbasket, or \"Choose Columns\" in Meds & Orders section of the refill encounter.              Passed - Medication is active on med list        Passed - Patient is age 18 or older        Passed - No active pregnancy on record        Passed - No positive pregnancy test in past 12 months           Pt last annual on 5/12/22, mychart sent for pt to make annual.    Thanks!  Willie Marquez RN   [facility]    "

## 2023-10-02 ENCOUNTER — OFFICE VISIT (OUTPATIENT)
Dept: OBGYN | Facility: CLINIC | Age: 48
End: 2023-10-02
Attending: OBSTETRICS & GYNECOLOGY
Payer: COMMERCIAL

## 2023-10-02 VITALS
DIASTOLIC BLOOD PRESSURE: 71 MMHG | SYSTOLIC BLOOD PRESSURE: 99 MMHG | WEIGHT: 137.4 LBS | BODY MASS INDEX: 20.29 KG/M2 | OXYGEN SATURATION: 98 % | HEART RATE: 62 BPM

## 2023-10-02 DIAGNOSIS — G47.01 INSOMNIA DUE TO MEDICAL CONDITION: Primary | ICD-10-CM

## 2023-10-02 DIAGNOSIS — N95.1 PERIMENOPAUSE: ICD-10-CM

## 2023-10-02 PROCEDURE — 99214 OFFICE O/P EST MOD 30 MIN: CPT | Performed by: OBSTETRICS & GYNECOLOGY

## 2023-10-02 NOTE — PROGRESS NOTES
Assessment & Plan     Insomnia due to medical condition  Discussed sleep disruption.   Does not appear to be related to VMS of menopause/perimenopause - those symptoms are rare if present currently.   Discussed I-CBT is first line treatment and ordered.   Discussed sedatives have a brief effect of helping to fall asleep but do not produce enduring effect throughout the night.   Recommend continuing to pursue improvement of shoulder pain, as that seems to significantly contribute  - Sleep Psychology  Referral; Future    Perimenopause  Could consider trial of ET, but in perimenopause MHT is unlikely to be sufficient; most patient needs COCs or similar dose. This is contra-indicated for Renetta given history of migraine with aura. Would be ok for trial of MHT, will continue to consider as she progresses through menopausal transition.   Discussed trial of gabapentin - her mom did not tolerate.   Discussed progesterone 300 mg nightly has been shown to improve sleep for people with VMS. Will consider.         35 minutes spent by me on the date of the encounter doing chart review, history and exam, documentation and further activities per the note           No follow-ups on file.    Reyna Marshall MD  [facility]    Subjective   Renetta is a 48 year old, presenting for the following health issues:  Consult      HPI   Presents for discussion of possible perimenopause and sleep disturbance.   Kyleena in place, since 2019  Random bleeding at first, then got lighter and light until gone.   Having PMS symptoms most months, only occasional spotting. Has not had significant hot flashes. Does not feel they are causing her sleep disturbance.     Having trouble falling asleep, and wakes up in the middle of the night  Has some chronic shoulder pain, has some deterioration on xray  The sleep has gotten progressively worse.   Previously had been a good sleeper    Over the last year feels more  nel  Feels physiological    Did combined oral contraception as a teenager, had terrible migraines  Tolerated progesterone only pills      Past Medical History:   Diagnosis Date    Asthma     Cervical high risk HPV (human papillomavirus) test positive 05/15/2014    05/15/14    Depression ages [age]    estrogen induced from OCP    Depressive disorder ages [age]    History of blood transfusion     Miscarriage 2011    1st trimester    PPROM Elevated Risk Factor 2015    Elevated risk with PPROM via Dr Karlos MONGE with persistent subchorionic hemorrhage.   Consult with patient on Betamethasone in 3rd trimester based on this risk unassociated with  ctx.    Please offer bethamethasone in 3rd trimester. (28 weeks)      Ulcer     UTI (lower urinary tract infection)        Past Surgical History:   Procedure Laterality Date    BIOPSY      dermatology    GENITOURINARY SURGERY      postpartum d&c    OPERATIVE HYSTEROSCOPY WITH MORCELLATOR N/A 2015    Procedure: OPERATIVE HYSTEROSCOPY WITH MORCELLATOR;  Surgeon: Reyna Marshall MD;  Location:  OR    [facility] APPENDECTOMY      [facility] STOMACH SURGERY PROCEDURE UNLISTED      Appendectomy       Family History   Problem Relation Age of Onset    Family History Negative Other     Hypertension Mother     Other Cancer Mother         Melanoma at age 27    Cancer Mother     Family History Negative Father     Coronary Artery Disease Maternal Grandfather     Hypertension Maternal Grandfather     Breast Cancer Maternal Grandmother         Cancer occurred in her late 80s       Social History     Socioeconomic History    Marital status: Single     Spouse name: Not on file    Number of children: 0    Years of education: Not on file    Highest education level: Not on file   Occupational History    Not on file   Tobacco Use    Smoking status: Never    Smokeless tobacco: Never   Substance and Sexual Activity    Alcohol use: Yes     Comment: 1-4 glasses of wine per  week    Drug use: Never    Sexual activity: Yes     Partners: Male     Birth control/protection: I.U.D.   Other Topics Concern    Parent/sibling w/ CABG, MI or angioplasty before 65F 55M? No   Social History Narrative        Works as a .        Social Documentation:        Balanced Diet: YES    Calcium intake: 2-3 per day    Caffeine: 2 per day    Exercise:  type of activity cardio;  3-4 times per week    Sunscreen: Yes    Seatbelts:  Yes    Self Breast Exam:  Yes    Self Testicular Exam: na    Physical/Emotional/Sexual Abuse: No    Do you feel safe in your environment? Yes        Cholesterol screen up to date: fam hx, had it checked 2 years ago    Eye Exam up to date: Yes    Dental Exam up to date: Yes    Pap smear up to date: Yes, do today.  Last was 06/08, nml    Mammogram up to date: Does Not Apply    Dexa Scan up to date: Does Not Apply    Colonoscopy up to date: Does Not Apply    Immunizations up to date: Yes    Glucose screen if over 40:  na                 Social Determinants of Health     Financial Resource Strain: Not on file   Food Insecurity: Not on file   Transportation Needs: Not on file   Physical Activity: Not on file   Stress: Not on file   Social Connections: Not on file   Interpersonal Safety: Not on file   Housing Stability: Not on file       Current Outpatient Medications   Medication    fluticasone (FLONASE) 50 MCG/ACT nasal spray    SUMAtriptan (IMITREX) 50 MG tablet    tazarotene (TAZORAC) 0.05 % external cream    traMADol (ULTRAM) 50 MG tablet    [employer] BISMUTH 262 MG chewable tablet     Current Facility-Administered Medications   Medication    levonorgestrel (KYLEENA) 19.5 MG IUD 19.5 mg          Allergies   Allergen Reactions    Ragweeds            Review of Systems   Constitutional, HEENT, cardiovascular, pulmonary, gi and gu systems are negative, except as otherwise noted.      Objective    BP 99/71 (BP Location: Right arm, Patient Position: Sitting, Cuff Size: Adult Regular)    Pulse 62   Wt 62.3 kg (137 lb 6.4 oz)   SpO2 98%   BMI 20.29 kg/m    Body mass index is 20.29 kg/m .  Physical Exam   GENERAL: healthy, alert and no distress  MS: no gross musculoskeletal defects noted, no edema  PSYCH: mentation appears normal, affect normal/bright

## 2023-10-18 ENCOUNTER — THERAPY VISIT (OUTPATIENT)
Dept: PHYSICAL THERAPY | Facility: CLINIC | Age: 48
End: 2023-10-18
Payer: COMMERCIAL

## 2023-10-18 DIAGNOSIS — M25.551 RIGHT HIP PAIN: Primary | ICD-10-CM

## 2023-10-18 PROCEDURE — 97530 THERAPEUTIC ACTIVITIES: CPT | Mod: GP | Performed by: PHYSICAL THERAPIST

## 2023-10-18 PROCEDURE — 97110 THERAPEUTIC EXERCISES: CPT | Mod: GP | Performed by: PHYSICAL THERAPIST

## 2023-10-18 PROCEDURE — 97161 PT EVAL LOW COMPLEX 20 MIN: CPT | Mod: GP | Performed by: PHYSICAL THERAPIST

## 2023-10-18 NOTE — PROGRESS NOTES
PHYSICAL THERAPY EVALUATION  Type of Visit: Evaluation    See electronic medical record for Abuse and Falls Screening details.    Subjective Pt presents to PT with c/o pain right hip flexor for the past two years. Pain is usually at night. She exercises regularly - yoga, mat Pilates, walking, biking.  MD appt 11/22 with x-rays indicate narrowing at L4-5.  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    Presenting condition or subjective complaint: Compression of the L4/L5 vertebrae is causing nerve pain in my right hip and calf.  Date of onset:  (MD visit 11/2022, pain for the past two years)    Relevant medical history: Concussions; Migraines or headaches; Pain at night or rest; Severe headaches   Dates & types of surgery: Appendectomy, 1990. Postpartum D & C, 2015    Prior diagnostic imaging/testing results: X-ray     Prior therapy history for the same diagnosis, illness or injury: No      Prior Level of Function  Transfers:   Ambulation:   ADL:   IADL:     Living Environment  Social support: With family members   Type of home: House   Stairs to enter the home: Yes 4 Is there a railing: Yes   Ramp: No   Stairs inside the home: Yes 14 Is there a railing: Yes   Help at home: None  Equipment owned:       Employment: Yes  and University faculty  Hobbies/Interests: Travel, pilates, cooking, reading, hiking, tennis    Patient goals for therapy: Sleep and sit for long periods without pain.    Pain assessment: Pain present     Objective   HIP EVALUATION  PAIN: Pain Level at Rest: 2/10  Pain Level with Use: 6/10  Pain Location: hip  Pain Quality: Aching and Sharp  Pain Frequency: intermittent or daily  Pain is Worst: nighttime  Pain is Exacerbated By: lying down  Pain is Relieved By: stretch and use  INTEGUMENTARY (edema, incisions):   POSTURE: WNL  GAIT:   Weightbearing Status:   Assistive Device(s):   Gait Deviations:   BALANCE/PROPRIOCEPTION: WNL  WEIGHTBEARING  ALIGNMENT: WNL  NON-WEIGHTBEARING ALIGNMENT:    ROM:   (Degrees) Left AROM Left PROM  Right AROM Right PROM   Hip Flexion wnl  wnl    Hip Extension       Hip Abduction wnl  wnl    Hip Adduction       Hip Internal Rotation wnl  wnl    Hip External Rotation wnl  Wnl, + at end range    Knee Flexion       Knee Extension       Lumbar Side glide     Lumbar Flexion Wnl, -   Lumbar Extension Wnl, -   Pain:   End feel:     PELVIC/SI SCREEN:   STRENGTH: WNL  LE FLEXIBILITY: WNL  SPECIAL TESTS:    Left Right   MICHELLE Negative  Negative    FADIR/Labrum/ELENA Negative  Negative    Femoral Nerve     Andria's     Piriformis  Negative    Quadrant Testing     SLR Negative  Negative    Slump Negative  Negative    Stork with Extension     Osmin            FUNCTIONAL TESTS: Double Leg Squat: Anterior knee translation, Knee valgus, Hip internal rotation, and Improper use of glutes/hips  Single Leg Squat: Anterior knee translation, Knee valgus, Hip internal rotation, and Improper use of glutes/hips  PALPATION:   + Tenderness At Location Left Right   Ischial Tuberosity     Greater Trochanter  -   IT Band  Tenderness to palpation   Hip Flexors  +   Piriformis - -   PSIS     ASIS - +   Adductors     Abductors  +   Iliac Crest     Glut Medius     Bursa  -   Pubis     JOINT MOBILITY:     Assessment & Plan   CLINICAL IMPRESSIONS  Medical Diagnosis: right hip pain    Treatment Diagnosis: right hip pain, intermittant leg pain   Impression/Assessment: Patient is a 48 year old female with right ant hep complaints.  The following significant findings have been identified: Pain and Decreased strength. These impairments interfere with their ability to perform self care tasks, recreational activities, and sleeping  as compared to previous level of function.     Clinical Decision Making (Complexity):  Clinical Presentation: Stable/Uncomplicated  Clinical Presentation Rationale: based on medical and personal factors listed in PT evaluation  Clinical  Decision Making (Complexity): Low complexity    PLAN OF CARE  Treatment Interventions:  Interventions: Manual Therapy, Neuromuscular Re-education, Therapeutic Activity, Therapeutic Exercise    Long Term Goals     PT Goal 1  Goal Description: sleep wiht PL 2/10      Frequency of Treatment: 1x/weel  Duration of Treatment: 6 weeks    Recommended Referrals to Other Professionals: Physical Therapy  Education Assessment:   Learner/Method: Patient;Pictures/Video    Risks and benefits of evaluation/treatment have been explained.   Patient/Family/caregiver agrees with Plan of Care.     Evaluation Time:     PT Eval, Low Complexity Minutes (92681): 12       Signing Clinician: Ambreen Lauren PT

## 2023-10-25 ENCOUNTER — OFFICE VISIT (OUTPATIENT)
Dept: DERMATOLOGY | Facility: CLINIC | Age: 48
End: 2023-10-25
Payer: COMMERCIAL

## 2023-10-25 DIAGNOSIS — L98.8 RHYTIDES: Primary | ICD-10-CM

## 2023-10-25 DIAGNOSIS — L81.4 LENTIGINES: ICD-10-CM

## 2023-10-25 DIAGNOSIS — Z80.8 FAMILY HX OF MELANOMA: Primary | ICD-10-CM

## 2023-10-25 DIAGNOSIS — D22.9 MULTIPLE BENIGN NEVI: ICD-10-CM

## 2023-10-25 DIAGNOSIS — D23.70 DERMATOFIBROMA OF LOWER EXTREMITY, UNSPECIFIED LATERALITY: ICD-10-CM

## 2023-10-25 PROCEDURE — 99203 OFFICE O/P NEW LOW 30 MIN: CPT | Performed by: DERMATOLOGY

## 2023-10-25 PROCEDURE — 96999 UNLISTED SPEC DERM SVC/PX: CPT | Performed by: DERMATOLOGY

## 2023-10-25 ASSESSMENT — PAIN SCALES - GENERAL
PAINLEVEL: NO PAIN (0)
PAINLEVEL: NO PAIN (0)

## 2023-10-25 NOTE — LETTER
10/25/2023       RE: Renetta Lauren  [address]  [place] [address]     Dear Colleague,    Thank you for referring your patient, Renetta Lauren, to the [facility] DERMATOLOGY CLINIC [place] at [facility]. Please see a copy of my visit note below.    Botulinum Injection Procedure Note:  Cosmetic    Dermatology Problem List:  Last skin check 10/25/23, recommend yearly    1. Rhytides and history of migraines  - Current Tx: Tazorac  - Botox 5/31/23, 10/25/23  2. Sebaceous hyperplasia, right forehead  - Future considerations: destruction with hyfrecator  3. Dermatofibroma, left medial ankle    PMHx: Has IUD. Migraines (no other neurologic diseases).  FHx: Melanoma (mother)  SHx: Works at the [employer]    ATTENDING STAFF SURGEON: Dr. Kaelyn Elliott    NURSE: Paola Van RN     ANESTHESIA:   None    PREOPERATIVE DIAGNOSIS:   Rhytides    LOCATION: glabella (24 units), crow's feet (6 units - no charge), forehead (9 units - no charge)    LOT NO: [id number]    EXP DATE: 2025/12    NDC NO: 4043-7300-85    OPERATION/PROCEDURE:   Intralesional botulinum toxin injection     Dilution with 0.6 preserved sterile normal saline in a 50-unit Botox vial.    Total units of botulinum toxin: 39    POSTOPERATIVE DIAGNOSIS:   SAME     PREPARATION:   Alcohol swab    DESCRIPTION OF OPERATION/PROCEDURE:   The nature and purpose of the procedure, associated risks including but not limited to bruising, headache or discomfort at the site(s), numbness, muscle twitching, brow or eyelid droop, headache, double vision, not enough effect or too much effect, difficulty whistling or drinking from a straw, loss of muscle tone, or infection. Possible consequences and complications, and alternative methods of treatment were explained in detail.   A signed informed operative consent was obtained.    The patient was taken to the operative suite and properly positioned. The area to be treated  was defined and confirmed by the patient and physician. The area for Botox injection was marked.    Cosmetic procedure: A total of 39 units were injected into sites at the see above. The patient tolerated the procedure well and there were no complications noted. Patient was given wound care instructions.    Clinical Follow-Up: 6 months in person for possible repeat Botox injections    Staff Involved:  Scribe/Staff    Scribe Disclosure:   IDamian, am serving as a scribe to document services personally performed by this physician, Dr. Kaelyn Elliott, based on data collection and the provider's statements to me.      Scribe Disclosure:   ITonja, am serving as a scribe to document services personally performed by Kaelyn Elliott MD based on data collection and the provider's statements to me.   Provider Disclosure:   The documentation recorded by the scribe accurately reflects the services I personally performed and the decisions made by me.    Kaelyn Elliott MD    Department of Dermatology  [facility]: Phone: [phone], Fax:[phone]  [facility]: Phone: [phone], Fax: [phone]

## 2023-10-25 NOTE — NURSING NOTE
Dermatology Rooming Note    Renetta Lauren's goals for this visit include:   Chief Complaint   Patient presents with    Skin Check     Here today for a skin check. No concerns.      Betsey Clifford RN

## 2023-10-25 NOTE — PROGRESS NOTES
[facility] Dermatology Note  Encounter Date: Oct 25, 2023  Office Visit     Dermatology Problem List:  Last skin check 10/25/23, recommended yearly  1. Rhytides and history of migraines  - s/p Botox 5/31/2023  - Current treatment: Tazorac 0.05% cream daily  2. Sebaceous hyperplasia, right forehead  - Future considerations: destruction with hyfrecator  3. Family hx melanoma (mother)     Social Hx: works at the [employer]    ____________________________________________    Assessment & Plan:    # Multiple benign nevi.   # Fhx melanoma.  - Monitor for ABCDEs of melanoma   - Continue sun protection - recommend SPF 30 or higher with frequent application   - Return sooner if noticing changing or symptomatic lesions    # Benign lesions - multiple benign nevi, lentigenes, dermatofibroma.  - No treatment required    Procedures Performed:   None    Follow-up: 1 year(s) in-person, or earlier for new or changing lesions    Staff and Scribe:     I, Kassie Mejia, am serving as a scribe to document services personally performed by Dr. Audra Nunes, based on data collection and the provider's statements to me.     Provider Disclosure:   The documentation recorded by the scribe accurately reflects the services I personally performed and the decisions made by me.    Audra Nunes MD    Department of Dermatology  [facility]: Phone: [phone], Fax: [phone]  11/1/2023       ____________________________________________    CC: Skin Check (Here today for a skin check. No concerns. )    HPI:  Ms. Renetta Lauren is a(n) 48 year old female who presents today as a return patient for FBSE.    The patient questions if she should take oral collagen.    FHx of melanoma, mother.    Patient is otherwise feeling well, without additional skin concerns.    Labs Reviewed:  N/A    Physical Exam:  Vitals: There were no  vitals taken for this visit.  SKIN: Total skin excluding the undergarment areas was performed. The exam included the head/face, neck, both arms, chest, back, abdomen, both legs, digits and/or nails.    - Multiple regular brown pigmented macules and papules are identified on the trunk and extremities. .   - Scattered brown macules on sun exposed areas.  - There is a firm tan/flesh colored papule that dimples with lateral pressure on the left medial ankle.  - No other lesions of concern on areas examined.     Medications:  Current Outpatient Medications   Medication    [employer] BISMUTH 262 MG chewable tablet    fluticasone (FLONASE) 50 MCG/ACT nasal spray    SUMAtriptan (IMITREX) 50 MG tablet    tazarotene (TAZORAC) 0.05 % external cream    traMADol (ULTRAM) 50 MG tablet     Current Facility-Administered Medications   Medication    levonorgestrel (KYLEENA) 19.5 MG IUD 19.5 mg      Past Medical History:   Patient Active Problem List   Diagnosis    CARDIOVASCULAR SCREENING; LDL GOAL LESS THAN 160    Post-nasal drip    Screening for cervical cancer    Migraine without aura and without status migrainosus, not intractable    Human papilloma virus (HPV) infection    Chronic, continuous use of opioids    Cervical high risk HPV (human papillomavirus) test positive    Seasonal allergic rhinitis due to pollen     Past Medical History:   Diagnosis Date    Asthma     Cervical high risk HPV (human papillomavirus) test positive 05/15/2014    05/15/14    Depression ages 17-21    estrogen induced from OCP    Depressive disorder ages [age]    History of blood transfusion     Miscarriage 2011    1st trimester    PPROM Elevated Risk Factor 2015    Elevated risk with PPROM via Dr Karlos MONGE with persistent subchorionic hemorrhage.   Consult with patient on Betamethasone in 3rd trimester based on this risk unassociated with  ctx.    Please offer bethamethasone in 3rd trimester. (28 weeks)      Ulcer     UTI (lower urinary tract  infection)         CC No referring provider defined for this encounter. on close of this encounter.

## 2023-10-25 NOTE — PROGRESS NOTES
Botulinum Injection Procedure Note:  Cosmetic    Dermatology Problem List:  Last skin check 10/25/23, recommend yearly    1. Rhytides and history of migraines  - Current Tx: Tazorac  - Botox 5/31/23, 10/25/23  2. Sebaceous hyperplasia, right forehead  - Future considerations: destruction with hyfrecator  3. Dermatofibroma, left medial ankle    PMHx: Has IUD. Migraines (no other neurologic diseases).  FHx: Melanoma (mother)  SHx: Works at the [employer]    ATTENDING STAFF SURGEON: Dr. Kaelyn Elliott    NURSE: Paola Van RN     ANESTHESIA:   None    PREOPERATIVE DIAGNOSIS:   Rhytides    LOCATION: glabella (24 units), crow's feet (6 units - no charge), forehead (9 units - no charge)    LOT NO: [id number]    EXP DATE: 2025/12    NDC NO: 9425-3913-06    OPERATION/PROCEDURE:   Intralesional botulinum toxin injection     Dilution with 0.6 preserved sterile normal saline in a 50-unit Botox vial.    Total units of botulinum toxin: 39    POSTOPERATIVE DIAGNOSIS:   SAME     PREPARATION:   Alcohol swab    DESCRIPTION OF OPERATION/PROCEDURE:   The nature and purpose of the procedure, associated risks including but not limited to bruising, headache or discomfort at the site(s), numbness, muscle twitching, brow or eyelid droop, headache, double vision, not enough effect or too much effect, difficulty whistling or drinking from a straw, loss of muscle tone, or infection. Possible consequences and complications, and alternative methods of treatment were explained in detail.   A signed informed operative consent was obtained.    The patient was taken to the operative suite and properly positioned. The area to be treated was defined and confirmed by the patient and physician. The area for Botox injection was marked.    Cosmetic procedure: A total of 39 units were injected into sites at the see above. The patient tolerated the procedure well and there were no complications noted. Patient was given wound care instructions.    Clinical  Follow-Up: 6 months in person for possible repeat Botox injections    Staff Involved:  Scribe/Staff    Scribe Disclosure:   I, Damian Bernal, am serving as a scribe to document services personally performed by this physician, Dr. Kaelyn Elliott, based on data collection and the provider's statements to me.      Scribe Disclosure:   I, Tonja Velazquez, am serving as a scribe to document services personally performed by Kaelyn Elliott MD based on data collection and the provider's statements to me.   Provider Disclosure:   The documentation recorded by the scribe accurately reflects the services I personally performed and the decisions made by me.    Kaelyn Elliott MD    Department of Dermatology  [facility]: Phone: [phone], Fax:[phone]  [facility]: Phone: [phone], Fax: [phone]

## 2023-10-25 NOTE — PATIENT INSTRUCTIONS
Botulinum Toxin (Botox/Dysport) Cosmetic Information    Risks: I will have pain, redness, and swelling. I may have bruising, headache or discomfort at the site(s). Risks are asymmetry, numbness, twitching, brow droop, eyelid droop, headache, double vision, not enough effect or too much effect, difficulty whistling or drinking, loss of muscle tone, headache or infection. A touch-up or multiple treatments may be required.    About Botulinum Toxin (Botox/Dysport)  You have inquired about Botox cosmetic. Botulinum toxin is a purified protein derivative developed from bacteria. It has the ability to immobilize facial muscles that create dynamic wrinkles. Dynamic wrinkles develop due to muscle contraction, and over time become permanent folds in the skin, even when the muscles are not flexed. The use of Botox results in a very pleasing cosmetic effect for many people, leading to a more youthful, relaxed appearance. Botox can be used in combination with injectable fillers, chemical peels, and laser resurfacing to treat deeper wrinkles. It also has become an accepted form of treatment for hyperhidrosis, (or excessive sweating) in people who have not responded to other therapies.     With my treatment side effects may include bruising, headache or discomfort at the site(s). Asymmetry may occur and a touch-up may be required. Risks of this procedure include numbness, muscle twitching, brow or eyelid droop, headache, double vision, not enough effect or too much effect, difficulty whistling or drinking from a straw, loss of muscle tone, headache or infection.    Post-Procedure Instructions:   Do not rub the treated area. Avoid exercise for the 24 hours following the procedure. You may move the muscles. Some people will experience bruising or eyelid ptosis (drooping) after injection. This is temporary and usually mild. Eyelid ptosis may be treated with special eye drops. Call your doctor if you have any questions or concerns  after your treatment.     Do not massage the area for 24 hours  Stay upright for 4 hours  Do not push on the injected area  Contact us if you have asymmetry at 2 weeks  Do not apply skin products to the area for 24 hours    Who should I call with questions?  [facility]: [phone]  [facility]: [phone]  For urgent needs outside of business hours call the [facility] at [phone] and ask for the resident on call  MyChart messaging may be delayed

## 2023-10-25 NOTE — PATIENT INSTRUCTIONS

## 2023-10-25 NOTE — LETTER
10/25/2023       RE: Renetta Lauren  [address]  [facility] [facility] [address]     Dear Colleague,    Thank you for referring your patient, Renetta Lauren, to the [facility] DERMATOLOGY CLINIC [facility]. Please see a copy of my visit note below.    [facility] Dermatology Note  Encounter Date: Oct 25, 2023  Office Visit     Dermatology Problem List:  Last skin check 10/25/23, recommended yearly  1. Rhytides and history of migraines  - s/p Botox 5/31/2023  - Current treatment: Tazorac 0.05% cream daily  2. Sebaceous hyperplasia, right forehead  - Future considerations: destruction with hyfrecator  3. Family hx melanoma (mother)     Social Hx: works at the [employer]    ____________________________________________    Assessment & Plan:    # Multiple benign nevi.   # Fhx melanoma.  - Monitor for ABCDEs of melanoma   - Continue sun protection - recommend SPF 30 or higher with frequent application   - Return sooner if noticing changing or symptomatic lesions    # Benign lesions - multiple benign nevi, lentigenes, dermatofibroma.  - No treatment required    Procedures Performed:   None    Follow-up: 1 year(s) in-person, or earlier for new or changing lesions    Staff and Scribe:     I, Kassie Mejia, am serving as a scribe to document services personally performed by Dr. Audra Nunes, based on data collection and the provider's statements to me.     Provider Disclosure:   The documentation recorded by the scribe accurately reflects the services I personally performed and the decisions made by me.    Audra Nunes MD    Department of Dermatology  [facility]: Phone: [phone], Fax: [phone]  11/1/2023       ____________________________________________    CC: Skin Check (Here today for a skin check. No concerns.  )    HPI:  Ms. Renetta Lauren is a(n) 48 year old female who presents today as a return patient for FBSE.    The patient questions if she should take oral collagen.    FHx of melanoma, mother.    Patient is otherwise feeling well, without additional skin concerns.    Labs Reviewed:  N/A    Physical Exam:  Vitals: There were no vitals taken for this visit.  SKIN: Total skin excluding the undergarment areas was performed. The exam included the head/face, neck, both arms, chest, back, abdomen, both legs, digits and/or nails.    - Multiple regular brown pigmented macules and papules are identified on the trunk and extremities. .   - Scattered brown macules on sun exposed areas.  - There is a firm tan/flesh colored papule that dimples with lateral pressure on the left medial ankle.  - No other lesions of concern on areas examined.     Medications:  Current Outpatient Medications   Medication    [employer] BISMUTH 262 MG chewable tablet    fluticasone (FLONASE) 50 MCG/ACT nasal spray    SUMAtriptan (IMITREX) 50 MG tablet    tazarotene (TAZORAC) 0.05 % external cream    traMADol (ULTRAM) 50 MG tablet     Current Facility-Administered Medications   Medication    levonorgestrel (KYLEENA) 19.5 MG IUD 19.5 mg      Past Medical History:   Patient Active Problem List   Diagnosis    CARDIOVASCULAR SCREENING; LDL GOAL LESS THAN 160    Post-nasal drip    Screening for cervical cancer    Migraine without aura and without status migrainosus, not intractable    Human papilloma virus (HPV) infection    Chronic, continuous use of opioids    Cervical high risk HPV (human papillomavirus) test positive    Seasonal allergic rhinitis due to pollen     Past Medical History:   Diagnosis Date    Asthma     Cervical high risk HPV (human papillomavirus) test positive 05/15/2014    05/15/14    Depression ages 17-21    estrogen induced from OCP    Depressive disorder ages 17-21    History of blood transfusion     Miscarriage 11/2011    1st trimester     PPROM Elevated Risk Factor 2015    Elevated risk with PPROM via Dr Karlos MONGE with persistent subchorionic hemorrhage.   Consult with patient on Betamethasone in 3rd trimester based on this risk unassociated with  ctx.    Please offer bethamethasone in 3rd trimester. (28 weeks)      Ulcer     UTI (lower urinary tract infection)         CC No referring provider defined for this encounter. on close of this encounter.

## 2023-10-26 DIAGNOSIS — G43.009 MIGRAINE WITHOUT AURA AND WITHOUT STATUS MIGRAINOSUS, NOT INTRACTABLE: ICD-10-CM

## 2023-10-26 RX ORDER — SUMATRIPTAN 50 MG/1
50 TABLET, FILM COATED ORAL
Qty: 12 TABLET | Refills: 0 | Status: SHIPPED | OUTPATIENT
Start: 2023-10-26 | End: 2023-12-11

## 2023-11-07 ENCOUNTER — TELEPHONE (OUTPATIENT)
Dept: DERMATOLOGY | Facility: CLINIC | Age: 48
End: 2023-11-07
Payer: COMMERCIAL

## 2023-11-07 NOTE — TELEPHONE ENCOUNTER
Patient Contacted and schedule the following:    Appointment type: Return  Provider: Dr. Nunes  Return date: 10/25/24  Specialty phone number: [phone]

## 2023-12-10 ENCOUNTER — HOSPITAL ENCOUNTER (OUTPATIENT)
Dept: MRI IMAGING | Facility: CLINIC | Age: 48
Discharge: HOME | End: 2023-12-10
Attending: FAMILY MEDICINE
Payer: COMMERCIAL

## 2023-12-10 DIAGNOSIS — M24.011 LOOSE BODY IN SHOULDER JOINT, RIGHT: ICD-10-CM

## 2023-12-10 DIAGNOSIS — M51.369 DDD (DEGENERATIVE DISC DISEASE), LUMBAR: ICD-10-CM

## 2023-12-10 PROCEDURE — 72148 MRI LUMBAR SPINE W/O DYE: CPT

## 2023-12-10 PROCEDURE — 73221 MRI JOINT UPR EXTREM W/O DYE: CPT | Mod: 26 | Performed by: RADIOLOGY

## 2023-12-10 PROCEDURE — 73221 MRI JOINT UPR EXTREM W/O DYE: CPT | Mod: RT

## 2023-12-11 DIAGNOSIS — G43.009 MIGRAINE WITHOUT AURA AND WITHOUT STATUS MIGRAINOSUS, NOT INTRACTABLE: ICD-10-CM

## 2023-12-12 RX ORDER — SUMATRIPTAN 50 MG/1
50 TABLET, FILM COATED ORAL
Qty: 12 TABLET | Refills: 0 | Status: SHIPPED | OUTPATIENT
Start: 2023-12-12 | End: 2024-01-18

## 2023-12-13 ENCOUNTER — MYC REFILL (OUTPATIENT)
Dept: FAMILY MEDICINE | Facility: CLINIC | Age: 48
End: 2023-12-13
Payer: COMMERCIAL

## 2023-12-13 DIAGNOSIS — M25.531 RIGHT WRIST PAIN: ICD-10-CM

## 2023-12-13 DIAGNOSIS — F11.90 CHRONIC, CONTINUOUS USE OF OPIOIDS: ICD-10-CM

## 2023-12-13 DIAGNOSIS — M51.369 DDD (DEGENERATIVE DISC DISEASE), LUMBAR: ICD-10-CM

## 2023-12-13 DIAGNOSIS — M24.011 LOOSE BODY IN SHOULDER JOINT, RIGHT: ICD-10-CM

## 2023-12-13 RX ORDER — TRAMADOL HYDROCHLORIDE 50 MG/1
50-75 TABLET ORAL
Qty: 45 TABLET | Refills: 3 | Status: SHIPPED | OUTPATIENT
Start: 2023-12-20 | End: 2024-04-04

## 2023-12-13 NOTE — RESULT ENCOUNTER NOTE
Dear Renetta,   Your MRI revealed multiple abnormalities.  Findings were significant for degenerative disc disease at the level of L4-L5 and L5-S1.  Continue with physical activity to strengthen some of the paraspinal muscles.  Consider starting physical therapy to strengthen your back.  Let us know if the back pain got worse and we will refer you to our spine clinic.    Best Regards  Kendall Garza MD

## 2023-12-13 NOTE — RESULT ENCOUNTER NOTE
"Dear Renetta,   Your MRI revealed multiple shoulder abnormalities.  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     Best Regards  Kendall Garza MD"

## 2023-12-14 NOTE — PROGRESS NOTES
DISCHARGE  Reason for Discharge: Patient chooses to discontinue therapy.    Equipment Issued:     Discharge Plan: Patient to continue home program.    Referring Provider:  Silver Cortez

## 2023-12-19 ENCOUNTER — MYC MEDICAL ADVICE (OUTPATIENT)
Dept: FAMILY MEDICINE | Facility: CLINIC | Age: 48
End: 2023-12-19
Payer: COMMERCIAL

## 2024-01-18 ENCOUNTER — TELEPHONE (OUTPATIENT)
Dept: FAMILY MEDICINE | Facility: CLINIC | Age: 49
End: 2024-01-18
Payer: COMMERCIAL

## 2024-01-18 DIAGNOSIS — G43.009 MIGRAINE WITHOUT AURA AND WITHOUT STATUS MIGRAINOSUS, NOT INTRACTABLE: ICD-10-CM

## 2024-01-18 NOTE — TELEPHONE ENCOUNTER
Larry Rayo, RN  Ea [id number] minutes ago (2:17 PM)     JM  Has patient used 8 or more in the past 30 days?

## 2024-01-18 NOTE — TELEPHONE ENCOUNTER
[facility]    Argelia ALBA RN on 1/18/2024 at 2:52 PM

## 2024-01-19 RX ORDER — SUMATRIPTAN 50 MG/1
50 TABLET, FILM COATED ORAL
Qty: 12 TABLET | Refills: 0 | Status: SHIPPED | OUTPATIENT
Start: 2024-01-19 | End: 2024-04-04

## 2024-01-19 NOTE — TELEPHONE ENCOUNTER
RN called and spoke with patient.   She has used less than 6 tablets in the last 30 days.   Appears script was signed today; closing encounter.     Argelia ALBA RN on 1/19/2024 at 8:33 AM

## 2024-01-22 ENCOUNTER — OFFICE VISIT (OUTPATIENT)
Dept: VASCULAR SURGERY | Facility: CLINIC | Age: 49
End: 2024-01-22
Payer: COMMERCIAL

## 2024-01-22 DIAGNOSIS — I78.1 SPIDER VEINS: Primary | ICD-10-CM

## 2024-01-22 NOTE — LETTER
1/22/2024         RE: Renetta Lauren  [address]  [place] [address]        Dear Colleague,    Thank you for referring your patient, Renetta Lauren, to the [facility] VEIN CLINIC [place]. Please see a copy of my visit note below.        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 Lauren is a 47 year old woman who was evaluated in Vein Clinic. She has telangiectasias of the lower extremities bilaterally and desired sclerotherapy for management. This is her third session (last session was 11/2022).  Details of the procedure including risks of bleeding, infection, ulceration, hyperpigmentation, deep vein thrombosis, and matting were discussed.  The patient understood and wished to proceed.  Informed consent was obtained.      Operative description:  She was positioned alternately supine and then prone on the procedure table.  Using lighted magnification, the spider veins were identified on the lower legs bilaterally.  Each syringe of 0.5% Polidocanol was foamed with 2 parts air and 1 part solution.  The skin overlying the telangiectasias and veins was swabbed with alcohol. A 30-gauge needle was used to access the veins of the leg and these were serially injected with the foamed 0.5% Polidocanol, until filling and blanching of the venous network was achieved. A total of 4 mL of Polidocanol were used.     Hemostasis was obtained with dry gauze and pressure. The legs were then cleaned with saline solution and dressed appropriately with compression stockings.        The patient was asked to walk about 10-15 mins prior to leaving the clinic to return home.  She will return to clinic in 4-6 weeks for a post-operative check.      Sclerotherapy    Date/Time: 1/25/2024 2:19 PM    Performed by:  Candida Barreto MD  Authorized by: Candida Barreto MD    Time out: Immediately prior to the procedure a time out was called    1st Assist:  Zamzam Angulo CST/ERNESTO     Type:  Cosmetic  Session:  Full  Procedure side:  Bilateral  Solution/Amount:  .5 POLIDOCANOL  Patient tolerance:  Patient tolerated the procedure well with no immediate complications  Wrap/Hose:  Hose      Candida Barreto MD [facility]       Again, thank you for allowing me to participate in the care of your patient.        Sincerely,        Candida Barreto MD

## 2024-01-22 NOTE — PROGRESS NOTES
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 Lauren is a 47 year old woman who was evaluated in Vein Clinic. She has telangiectasias of the lower extremities bilaterally and desired sclerotherapy for management. This is her third session (last session was 11/2022).  Details of the procedure including risks of bleeding, infection, ulceration, hyperpigmentation, deep vein thrombosis, and matting were discussed.  The patient understood and wished to proceed.  Informed consent was obtained.      Operative description:  She was positioned alternately supine and then prone on the procedure table.  Using lighted magnification, the spider veins were identified on the lower legs bilaterally.  Each syringe of 0.5% Polidocanol was foamed with 2 parts air and 1 part solution.  The skin overlying the telangiectasias and veins was swabbed with alcohol. A 30-gauge needle was used to access the veins of the leg and these were serially injected with the foamed 0.5% Polidocanol, until filling and blanching of the venous network was achieved. A total of 4 mL of Polidocanol were used.     Hemostasis was obtained with dry gauze and pressure. The legs were then cleaned with saline solution and dressed appropriately with compression stockings.        The patient was asked to walk about 10-15 mins prior to leaving the clinic to return home.  She will return to clinic in 4-6 weeks for a post-operative check.      Sclerotherapy    Date/Time: 1/25/2024 2:19 PM    Performed by: Candida Barreto MD  Authorized by: Candida Barreot MD    Time out: Immediately prior to the procedure a time out was called    1st Assist:  Zamzam Angulo CST/ERNESTO     Type:  Cosmetic  Session:  Full  Procedure side:  Bilateral  Solution/Amount:  .5  POLIDOCANOL  Patient tolerance:  Patient tolerated the procedure well with no immediate complications  Wrap/Hose:  John Barreto MD [facility]

## 2024-01-25 PROCEDURE — S9999 SALES TAX: HCPCS | Performed by: INTERNAL MEDICINE

## 2024-01-25 PROCEDURE — 36468 NJX SCLRSNT SPIDER VEINS: CPT | Performed by: INTERNAL MEDICINE

## 2024-03-04 ENCOUNTER — OFFICE VISIT (OUTPATIENT)
Dept: VASCULAR SURGERY | Facility: CLINIC | Age: 49
End: 2024-03-04
Payer: COMMERCIAL

## 2024-03-04 DIAGNOSIS — I78.1 SPIDER VEINS: Primary | ICD-10-CM

## 2024-03-04 PROCEDURE — 99207 PR VEINSOLUTIONS POST OPERATIVE VISIT: CPT | Performed by: INTERNAL MEDICINE

## 2024-03-04 NOTE — LETTER
3/4/2024         RE: Renetta Lauern  [address]  [facility] [address]        Dear Colleague,    Thank you for referring your patient, Renetta Lauren, to the [facility] [place]. Please see a copy of my visit note below.        Vein Clinic Sclerotherapy Note  January 22, 2024      Ms. Lauren is a 47 year old woman who was evaluated in Vein Clinic. She has telangiectasias of the lower extremities bilaterally and desired sclerotherapy for management. She has had three previous session, most recently 1/22/24 (prior to that 7/22 and 11/22).  Overall, appearance improved but some residual areas right anterior and lateral thigh.  She is uncertain she would like to proceed with more treatment and will be taking a trip soon where she cannot wear compression stockings.  We discussed option of half session to focus on specific areas.  She will consider this option and schedule at her convenience if she would like to proceed.     Candida Barreto MD [facility]       Again, thank you for allowing me to participate in the care of your patient.        Sincerely,        Candida Barreto MD

## 2024-03-07 NOTE — PROGRESS NOTES
Vein Clinic Sclerotherapy Note  January 22, 2024      Ms. Lauren is a 47 year old woman who was evaluated in Vein Clinic. She has telangiectasias of the lower extremities bilaterally and desired sclerotherapy for management. She has had three previous session, most recently 1/22/24 (prior to that 7/22 and 11/22).  Overall, appearance improved but some residual areas right anterior and lateral thigh.  She is uncertain she would like to proceed with more treatment and will be taking a trip soon where she cannot wear compression stockings.  We discussed option of half session to focus on specific areas.  She will consider this option and schedule at her convenience if she would like to proceed.     Candida Barreto MD [facility]    Patient Seen in: [facility] Emergency Department    History   Patient presents with:  Headache (neurologic)    Stated Complaint: migraine    HPI    Pleasant 59-year-old presents with history of migraines with headache, she says this is migraine-like Smoking status: Never Smoker      Smokeless tobacco: Never Used    Alcohol use: Yes      Alcohol/week: 0.0 oz      Comment: Soc    Drug use: No      Review of Systems    Positive for stated complaint: migraine  Other systems are as noted in HPI.   Constitut Toradol. She felt better prior to discharge. Further work-up not indicated at this time. Follow-up with primary care physician as discussed. Prescription for Fiorinal was given. At 2:15 AM she feels very much better.   We spoke about follow-up an

## 2024-03-18 ENCOUNTER — OFFICE VISIT (OUTPATIENT)
Dept: VASCULAR SURGERY | Facility: CLINIC | Age: 49
End: 2024-03-18
Payer: COMMERCIAL

## 2024-03-18 DIAGNOSIS — I78.1 SPIDER VEINS: Primary | ICD-10-CM

## 2024-03-18 PROCEDURE — S9999 SALES TAX: HCPCS | Performed by: INTERNAL MEDICINE

## 2024-03-18 PROCEDURE — 36468 NJX SCLRSNT SPIDER VEINS: CPT | Performed by: INTERNAL MEDICINE

## 2024-03-18 NOTE — LETTER
3/18/2024         RE: Renetta Lauren  [address]  [facility] [address]        Dear Colleague,    Thank you for referring your patient, Renetta Lauren, to the [facility]IN [facility] [place]. Please see a copy of my visit note below.        Vein Clinic Sclerotherapy Note  March 18, 2024    Pre procedure Diagnosis:    Cosmetically displeasing telangiectasias of the legs bilaterally     Postoperative Diagnosis:  Same     Procedure:   Sclerotherapy of leg telangiectasias    Indication:  Ms. Lauren is a 48 year old woman who was evaluated in Vein Clinic. She has telangiectasias of the lower extremities bilaterally and desired sclerotherapy for management. This is her fourth session. Details of the procedure including risks of bleeding, infection, ulceration, hyperpigmentation, deep vein thrombosis, and matting were discussed.  The patient understood and wished to proceed.  Informed consent was obtained.      Operative description:  She was positioned alternately supine on the procedure table.  Using lighted magnification, the spider veins were identified on the right leg.    Each syringe of 0.5% Polidocanol was foamed with 2 parts air and 1 part solution.  The skin overlying the telangiectasias and veins was swabbed with alcohol. A 30-gauge needle was used to access the veins of the leg and these were serially injected with the foamed 0.5% Polidocanol, until filling and blanching of the venous network was achieved. A total of 2 mL of Polidocanol were used.     Hemostasis was obtained with dry gauze and pressure. The legs were then cleaned with saline solution and dressed appropriately with compression stockings.        The patient was asked to walk about 10-15 mins prior to leaving the clinic to return home.  She will return to clinic in 4-6 weeks for a post-operative check.      Sclerotherapy    Date/Time: 3/18/2024 2:01 PM    Performed by: Candida Barreto MD  Authorized by: Candida Barreto MD     Circulator:  Yomaira Lees RN     Type:  Cosmetic  Session:  Limited  Procedure side:  Right  Solution/Amount:  .5 POLIDOCANOL  Wrap/Hose:  Hose      Candida Barreto MD [facility]       Again, thank you for allowing me to participate in the care of your patient.        Sincerely,        Candida Barreto MD

## 2024-03-18 NOTE — PROGRESS NOTES
Vein Clinic Sclerotherapy Note  March 18, 2024    Pre procedure Diagnosis:    Cosmetically displeasing telangiectasias of the legs bilaterally     Postoperative Diagnosis:  Same     Procedure:   Sclerotherapy of leg telangiectasias    Indication:  Ms. Lauren is a 48 year old woman who was evaluated in Vein Clinic. She has telangiectasias of the lower extremities bilaterally and desired sclerotherapy for management. This is her fourth session. Details of the procedure including risks of bleeding, infection, ulceration, hyperpigmentation, deep vein thrombosis, and matting were discussed.  The patient understood and wished to proceed.  Informed consent was obtained.      Operative description:  She was positioned alternately supine on the procedure table.  Using lighted magnification, the spider veins were identified on the right leg.    Each syringe of 0.5% Polidocanol was foamed with 2 parts air and 1 part solution.  The skin overlying the telangiectasias and veins was swabbed with alcohol. A 30-gauge needle was used to access the veins of the leg and these were serially injected with the foamed 0.5% Polidocanol, until filling and blanching of the venous network was achieved. A total of 2 mL of Polidocanol were used.     Hemostasis was obtained with dry gauze and pressure. The legs were then cleaned with saline solution and dressed appropriately with compression stockings.        The patient was asked to walk about 10-15 mins prior to leaving the clinic to return home.  She will return to clinic in 4-6 weeks for a post-operative check.      Sclerotherapy    Date/Time: 3/18/2024 2:01 PM    Performed by: Candida Barreto MD  Authorized by: Candida Barreto MD    Circulator:  Yomaira Lees RN     Type:  Cosmetic  Session:  Limited  Procedure side:  Right  Solution/Amount:  .5 POLIDOCANOL  Wrap/Hose:  John Barreto MD [facility]

## 2024-04-04 ENCOUNTER — VIRTUAL VISIT (OUTPATIENT)
Dept: FAMILY MEDICINE | Facility: CLINIC | Age: 49
End: 2024-04-04
Payer: COMMERCIAL

## 2024-04-04 DIAGNOSIS — Z12.11 SCREEN FOR COLON CANCER: Primary | ICD-10-CM

## 2024-04-04 DIAGNOSIS — Z12.11 ENCOUNTER FOR SCREENING FOR MALIGNANT NEOPLASM OF COLON: ICD-10-CM

## 2024-04-04 DIAGNOSIS — M25.531 RIGHT WRIST PAIN: ICD-10-CM

## 2024-04-04 DIAGNOSIS — M24.011 LOOSE BODY IN SHOULDER JOINT, RIGHT: ICD-10-CM

## 2024-04-04 DIAGNOSIS — G43.009 MIGRAINE WITHOUT AURA AND WITHOUT STATUS MIGRAINOSUS, NOT INTRACTABLE: ICD-10-CM

## 2024-04-04 DIAGNOSIS — F11.90 CHRONIC, CONTINUOUS USE OF OPIOIDS: ICD-10-CM

## 2024-04-04 DIAGNOSIS — M51.369 DDD (DEGENERATIVE DISC DISEASE), LUMBAR: ICD-10-CM

## 2024-04-04 PROCEDURE — 99214 OFFICE O/P EST MOD 30 MIN: CPT | Mod: 95 | Performed by: FAMILY MEDICINE

## 2024-04-04 RX ORDER — TRAMADOL HYDROCHLORIDE 50 MG/1
50-75 TABLET ORAL
Qty: 45 TABLET | Refills: 3 | Status: SHIPPED | OUTPATIENT
Start: 2024-06-09 | End: 2024-04-04

## 2024-04-04 RX ORDER — SUMATRIPTAN 50 MG/1
50 TABLET, FILM COATED ORAL
Qty: 12 TABLET | Refills: 0 | Status: SHIPPED | OUTPATIENT
Start: 2024-04-04 | End: 2024-04-24

## 2024-04-04 RX ORDER — TRAMADOL HYDROCHLORIDE 50 MG/1
50-75 TABLET ORAL
Qty: 45 TABLET | Refills: 3 | Status: SHIPPED | OUTPATIENT
Start: 2024-04-04 | End: 2024-07-24

## 2024-04-04 ASSESSMENT — ANXIETY QUESTIONNAIRES
5. BEING SO RESTLESS THAT IT IS HARD TO SIT STILL: NOT AT ALL
1. FEELING NERVOUS, ANXIOUS, OR ON EDGE: SEVERAL DAYS
GAD7 TOTAL SCORE: 3
7. FEELING AFRAID AS IF SOMETHING AWFUL MIGHT HAPPEN: NOT AT ALL
8. IF YOU CHECKED OFF ANY PROBLEMS, HOW DIFFICULT HAVE THESE MADE IT FOR YOU TO DO YOUR WORK, TAKE CARE OF THINGS AT HOME, OR GET ALONG WITH OTHER PEOPLE?: NOT DIFFICULT AT ALL
GAD7 TOTAL SCORE: 3
7. FEELING AFRAID AS IF SOMETHING AWFUL MIGHT HAPPEN: NOT AT ALL
4. TROUBLE RELAXING: SEVERAL DAYS
GAD7 TOTAL SCORE: 3
IF YOU CHECKED OFF ANY PROBLEMS ON THIS QUESTIONNAIRE, HOW DIFFICULT HAVE THESE PROBLEMS MADE IT FOR YOU TO DO YOUR WORK, TAKE CARE OF THINGS AT HOME, OR GET ALONG WITH OTHER PEOPLE: NOT DIFFICULT AT ALL
2. NOT BEING ABLE TO STOP OR CONTROL WORRYING: NOT AT ALL
6. BECOMING EASILY ANNOYED OR IRRITABLE: NOT AT ALL
3. WORRYING TOO MUCH ABOUT DIFFERENT THINGS: SEVERAL DAYS

## 2024-04-04 NOTE — PROGRESS NOTES
Renetta is a 48 year old who is being evaluated via a billable video visit.    How would you like to obtain your AVS? MyChart  If the video visit is dropped, the invitation should be resent by: Text to cell phone: [phone]  Will anyone else be joining your video visit? No    Assessment & Plan     Renetta was seen today for results and recheck medication.    Diagnoses and all orders for this visit:    Migraine without aura and without status migrainosus, not intractable  -     SUMAtriptan (IMITREX) 50 MG tablet; Take 1 tablet (50 mg) by mouth at onset of headache for migraine May repeat in 2 hours. Max 4 tablets/24 hours.    Right wrist pain  Loose body in shoulder joint, right  DDD (degenerative disc disease), lumbar  Chronic, continuous use of opioids  Patient had physical therapy in the past.  Currently continues to perform back stretching exercises at home. pain is currently stable.  She had follow-up with sports medicine and will contact us if back pain got worse to get a referral to the spine clinic.      -      traMADol (ULTRAM) 50 MG tablet; Take 1-1.5 tablets (50-75 mg) by mouth nightly as needed (for wrist and shoulder pain)    Encounter for screening for malignant neoplasm of colon  -     Colonoscopy Screening  Referral; Future    Other orders  -     REVIEW OF HEALTH MAINTENANCE PROTOCOL ORDERS  -     PRIMARY CARE FOLLOW-UP SCHEDULING; Future         Follow-up Visit   Expected date:  Jun 04, 2024 (Approximate)      Follow Up Appointment Details:     Follow-up with whom?: Me    Follow-Up for what?: Adult Preventive Comment - with pap    How?: In Person    Is this an as-needed follow-up?: No                        Subjective   Renetta is a 48 year old, presenting for the following health issues:  No chief complaint on file.      4/4/2024     9:43 AM   Additional Questions   Roomed by Hammad BOLANOS     History of Present Illness       Headaches:   Since the patient's last clinic visit, headaches are: no  "change  The patient is getting headaches:  2-3 times per month  She is not able to do normal daily activities when she has a migraine.  The patient is taking the following rescue/relief medications:  Ibuprofen (Advil, Motrin) and sumatriptan (Imitrex)   Patient states \"I get some relief\" from the rescue/relief medications.   The patient is taking the following medications to prevent migraines:  No medications to prevent migraines  In the past 4 weeks, the patient has gone to an Urgent Care or Emergency Room 0 times times due to headaches.    Reason for visit:  Followup on MRI results for shoulder/hip/back and medication checkin    She eats 4 or more servings of fruits and vegetables daily.She consumes 0 sweetened beverage(s) daily.She exercises with enough effort to increase her heart rate 20 to 29 minutes per day.  She exercises with enough effort to increase her heart rate 5 days per week. She is missing 1 dose(s) of medications per week.  She is not taking prescribed medications regularly due to other.       Review of Systems  Constitutional, HEENT, cardiovascular, pulmonary, gi and gu systems are negative, except as otherwise noted.      Objective           Vitals:  No vitals were obtained today due to virtual visit.    Physical Exam   GENERAL: alert and no distress  EYES: Eyes grossly normal to inspection.  No discharge or erythema, or obvious scleral/conjunctival abnormalities.  RESP: No audible wheeze, cough, or visible cyanosis.    SKIN: Visible skin clear. No significant rash, abnormal pigmentation or lesions.  NEURO: Cranial nerves grossly intact.  Mentation and speech appropriate for age.  PSYCH: Appropriate affect, tone, and pace of words        Video-Visit Details    Type of service:  Video Visit   Originating Location (pt. Location): Home    Distant Location (provider location):  Off-site  Platform used for Video Visit: Barrera  Signed Electronically by: Kendall Garza MD    "

## 2024-04-04 NOTE — LETTER

## 2024-04-15 ASSESSMENT — SLEEP AND FATIGUE QUESTIONNAIRES
HOW LIKELY ARE YOU TO NOD OFF OR FALL ASLEEP WHILE SITTING AND TALKING TO SOMEONE: WOULD NEVER DOZE
HOW LIKELY ARE YOU TO NOD OFF OR FALL ASLEEP WHILE SITTING AND READING: WOULD NEVER DOZE
HOW LIKELY ARE YOU TO NOD OFF OR FALL ASLEEP WHILE WATCHING TV: WOULD NEVER DOZE
HOW LIKELY ARE YOU TO NOD OFF OR FALL ASLEEP WHEN YOU ARE A PASSENGER IN A CAR FOR AN HOUR WITHOUT A BREAK: SLIGHT CHANCE OF DOZING
HOW LIKELY ARE YOU TO NOD OFF OR FALL ASLEEP WHILE SITTING QUIETLY AFTER LUNCH WITHOUT ALCOHOL: WOULD NEVER DOZE
HOW LIKELY ARE YOU TO NOD OFF OR FALL ASLEEP IN A CAR, WHILE STOPPED FOR A FEW MINUTES IN TRAFFIC: WOULD NEVER DOZE
HOW LIKELY ARE YOU TO NOD OFF OR FALL ASLEEP WHILE LYING DOWN TO REST IN THE AFTERNOON WHEN CIRCUMSTANCES PERMIT: MODERATE CHANCE OF DOZING
HOW LIKELY ARE YOU TO NOD OFF OR FALL ASLEEP WHILE SITTING INACTIVE IN A PUBLIC PLACE: WOULD NEVER DOZE

## 2024-04-15 NOTE — PROGRESS NOTES
Botulinum Injection Procedure Note:  Cosmetic    Dermatology Problem List:  Last skin check 10/25/23, recommend yearly     1. Rhytides and history of migraines  - Current Tx: Tazorac  - Botox 5/31/23, 10/25/23  2. Sebaceous hyperplasia, right forehead  - Future considerations: destruction with hyfrecator  3. Dermatofibroma, left medial ankle     PMHx: Has IUD. Migraines (no other neurologic diseases).  FHx: Melanoma (mother)  SHx: Works at the [employer]    Interval History: Patient has had Botox before. She migraines and it helps with those.Can alternate sites every other time.       SURGEON (S): Dr. Kaelyn Elliott     NURSE:  Rowena Claros CMA     ANESTHESIA:   None    PREOPERATIVE DIAGNOSIS:   Rhytides    LOCATION: upper forehead and glabella    OPERATION/PROCEDURE:   Intralesional botulinum toxin injection     Dilution with 0.6 preserved sterile normal saline in a 50 Unit Botox Vial.    Total units of botulinum toxin: 26    POSTOPERATIVE DIAGNOSIS:   SAME     PREPARATION:   Alcohol swab    DESCRIPTION OF OPERATION/PROCEDURE:   The nature and purpose of the procedure, associated risks including but not limited to bruising, headache or discomfort at the site(s), numbness, muscle twitching, brow or eyelid droop, headache, double vision, not enough effect or too much effect, difficulty whistling or drinking from a straw, loss of muscle tone, or infection. Possible consequences and complications, and alternative methods of treatment were explained in detail. The patient declined a personal or family history of neuromuscular disease prior to the procedure. The patient is not pregnant or breast feeding. A signed informed operative consent was obtained.    The patient was taken to the operative suite and properly positioned. The area to be treated was defined and confirmed by the patient and physician. The area for Botox injection was marked.    Cosmetic procedure: Injections were performed. The patient tolerated the  procedure well and there were no complications noted.         Clinical Follow-Up: 2-3 weeks by phone if needed and otherwise 6 months for repeat botox      The number of units were reduced at the glabella site due to cost, will evaluate if this is effective.     Staff Involved:  Scribe/Staff  Scribe Disclosure:   I, Peri Joseph, am serving as a scribe to document services personally performed by Kaelyn Elliott MD based on data collection and the provider's statements to me.     Provider Disclosure:   The documentation recorded by the scribe accurately reflects the services I personally performed and the decisions made by me.    Kaelyn Elliott MD    Department of Dermatology  [facility]: Phone: [phone], Fax:[phone]  [facility]: Phone: [phone], Fax: [phone]

## 2024-04-15 NOTE — PATIENT INSTRUCTIONS
Botulinum Toxin (Botox/Dysport) Cosmetic Information    Risks: I will have pain, redness, and swelling. I may have bruising, headache or discomfort at the site(s). Risks are asymmetry, numbness, twitching, brow droop, eyelid droop, headache, double vision, not enough effect or too much effect, difficulty whistling or drinking, loss of muscle tone, headache or infection. A touch-up or multiple treatments may be required.    About Botulinum Toxin (Botox/Dysport)  You have inquired about Botox cosmetic. Botulinum toxin is a purified protein derivative developed from bacteria. It has the ability to immobilize facial muscles that create dynamic wrinkles. Dynamic wrinkles develop due to muscle contraction, and over time become permanent folds in the skin, even when the muscles are not flexed. The use of Botox results in a very pleasing cosmetic effect for many people, leading to a more youthful, relaxed appearance. Botox can be used in combination with injectable fillers, chemical peels, and laser resurfacing to treat deeper wrinkles. It also has become an accepted form of treatment for hyperhidrosis, (or excessive sweating) in people who have not responded to other therapies.     With my treatment side effects may include bruising, headache or discomfort at the site(s). Asymmetry may occur and a touch-up may be required. Risks of this procedure include numbness, muscle twitching, brow or eyelid droop, headache, double vision, not enough effect or too much effect, difficulty whistling or drinking from a straw, loss of muscle tone, headache or infection.    Post-Procedure Instructions:   Do not rub the treated area. Avoid exercise for the 24 hours following the procedure. You may move the muscles. Some people will experience bruising or eyelid ptosis (drooping) after injection. This is temporary and usually mild. Eyelid ptosis may be treated with special eye drops. Call your doctor if you have any questions or concerns  after your treatment.     Do not massage the area for 24 hours  Stay upright for 4 hours  Do not push on the injected area  Contact us if you have asymmetry at 2 weeks  Do not apply skin products to the area for 24 hours    Who should I call with questions?  [facility]: [phone]  [facility]: [phone]  For urgent needs outside of business hours call the [facility] at [phone] and ask for the resident on call  MyChart messaging may be delayed

## 2024-04-16 ENCOUNTER — VIRTUAL VISIT (OUTPATIENT)
Dept: SLEEP MEDICINE | Facility: CLINIC | Age: 49
End: 2024-04-16
Attending: OBSTETRICS & GYNECOLOGY
Payer: COMMERCIAL

## 2024-04-16 VITALS — HEIGHT: 69 IN | WEIGHT: 139 LBS | BODY MASS INDEX: 20.59 KG/M2

## 2024-04-16 DIAGNOSIS — F51.04 CHRONIC INSOMNIA: Primary | ICD-10-CM

## 2024-04-16 DIAGNOSIS — G47.01 INSOMNIA DUE TO MEDICAL CONDITION: ICD-10-CM

## 2024-04-16 PROCEDURE — 90791 PSYCH DIAGNOSTIC EVALUATION: CPT | Mod: 95 | Performed by: PSYCHOLOGIST

## 2024-04-16 ASSESSMENT — PAIN SCALES - GENERAL: PAINLEVEL: NO PAIN (0)

## 2024-04-16 NOTE — NURSING NOTE
Patient denies any changes since echeck-in regarding medication and allergies and states all information entered during echeck-in remains accurate.    Is the patient currently in the state of [place]? YES    Visit mode:VIDEO    If the visit is dropped, the patient can be reconnected by: VIDEO VISIT: Send to e-mail at: [email or web address]    Will anyone else be joining the visit? NO  (If patient encounters technical issues they should call [phone])    How would you like to obtain your AVS? MyChart    Are changes needed to the allergy or medication list? No, Pt stated no changes to allergies, and Pt stated no med changes    Are refills needed on medications prescribed by this physician? NO    Reason for visit: Consult (Consult to discuss insomnia, no initial questions/concerns per pt. Medications/allergies reviewed by pt, up to date per pt. )    Has patient had flu shot for current/most recent flu season? If so, when? Yes: 11/2023    Ana Blackwell, Visit Facilitator/MA.

## 2024-04-16 NOTE — PATIENT INSTRUCTIONS
1.  Preliminary recommendations are to focus on increasing relaxation and relaxing bedtime routine about 1-1.5 hours before bedtime.  Avoid doing Pilates or other exercises within a couple hours before bed.  Discontinue use of caffeine by noon.    2.  Overall sleep schedule appears to be stable and reasonable.  There may be a mild delay in overall sleep phase tending towards a somewhat later time of sleep onset.  For now, recommend an 8-hour sleep window of 11:30 PM-7:30 AM.  We may recommend reducing time in bed, i.e. sleep compression to about 7 hours in bed depending upon response to relaxation strategies.          CBT-I Module - Relaxation Training    Relaxation and Sleep    Winding Down Time    A wind-down time before you go to bed can help you relax your mind and body. Ways to help transition from a busy day to bedtime include things like:    Listening to calm music  Reading  Watching a pleasant or relaxing TV show  Taking a bath    We recommend you set a reminder to begin your wind down time one hour before bedtime.  Many mobile sleep apps such as Insomnia  have wind-down time reminders.    Relaxation and Sleep    Research shows relaxing before bed can reduce the time it takes to fall asleep and improve sleep quality.  Relaxation training works by reducing physical, emotional and mental arousal that can interfere with your sleep.     Relaxation skills are easy to learn on your own using widely available free mobile apps or online resources.  We recommend doing some form of relaxation exercise daily for at least 10 minutes.  This can include short breathing exercises used throughout the day to help manage stress.    If you are using the Insomnia  mobile liliane you will find  relaxation exercises by pressing the Tools icon  going to the Relax Your Body or Quiet Your Mind section.     Insight Timer, Calm, and Headspace are examples of well-reviewed mobile apps that offer free, for-purchase, and  subscription guided relaxation exercises and meditations.    Do not use guided relaxation tapes while driving or operating equipment.      Managing Worry before Bed and During the Night    All human beings worry.  Uncontrolled worry can affect your sleep and health by activating your arousal system.  Worry makes your brain, body and heart behave like there is a danger or threat.  This stress response can make it more difficult to fall asleep and stay asleep. The most common types of worry include:    Concerned about unfinished tasks  Concern over family, finances or work  Worry about things beyond your control    Scheduling Constructive Worry Time    The part of our brain that plans, sorts, and helps manage our emotions is less effective in doing its job as nighttime comes.  Without the usual distractions of the day, we tend to worry more and have trouble putting aside our worries.    A simple technique called Constructive Worry Time can help to reduce and manage worry as you approach bedtime or in the middle of the night. It is most effective when practiced daily.

## 2024-04-16 NOTE — PROGRESS NOTES
Virtual Visit Details    Type of service:  Video Visit     Originating Location (pt. Location): Home    Distant Location (provider location):  On-site  Platform used for Video Visit: AmWell    Visit Start Time: 1:01 PM  Visit End Time: 1:50 PM      SLEEP MEDICINE CONSULTATION  Sleep Psychology  Apr 16, 2024    Name: Renetta Keller MRN# [id number]   Age: 48 year old YOB: 1975     Date of Consultation: Apr 16, 2024  Consultation is requested by: Reyna Marshall MD  [address] [place] [id number]  Primary care provider: Kendall Garza    Reason for Sleep Consultation     Renetta Keller is a 48 year old female seen today for a behavioral sleep medicine consultation because of insomnia    Assessment and Plan     Sleep Diagnoses:         Chronic insomnia    Co-occurring Conditions:    HPV  Seasonal Allergies  Asthma  Depressive Disorder  Migraine Headaches    Clinical Impressions:    Renetta Keller was seen for a sleep psychology consultation and possible behavioral sleep intervention and treatment. History and clinical presentation is consistent with chronic, primary psychophysiologic insomnia.  The precipitant to the onset of insomnia appears to have been physiologic and stemming from extended bedrest required for 3 months during her pregnancy occurring 10 years ago.  This experience was highly stressful she subsequently experienced loss of strength and joint issues which have also affected sleep over time.  Psychophysiologic factors likely contributing to maintenance of insomnia include conditioned arousal, lack of sufficient relaxing routine prior to bed, exercise close to bed, and possibly effects of caffeine taken in the mid afternoon.  This evaluation suggests low pretest probability for obstructive sleep apnea and no signs or symptoms of other sleep disorder such as restless leg syndrome.  A sleep study is not indicated at this time.  Renetta is a very good candidate  for brief cognitive behavioral therapy for insomnia.    Recommendations and Counselin.  Preliminary recommendations are to focus on increasing relaxation and relaxing bedtime routine about 1-1.5 hours before bedtime.  Avoid doing Pilates or other exercises within a couple hours before bed.  Discontinue use of caffeine by noon.    2.  Overall sleep schedule appears to be stable and reasonable.  There may be a mild delay in overall sleep phase tending towards a somewhat later time of sleep onset.  For now, recommend an 8-hour sleep window of 11:30 PM-7:30 AM.  We may recommend reducing time in bed, i.e. sleep compression to about 7 hours in bed depending upon response to relaxation strategies.    3.  Relaxation module introduced containing after visit summary    Renetta was provided information about the pathophysiology of insomnia, psychophysiological factors contributing to the onset and maintenance of insomnia and how co-occurring medical conditions and intrinsic sleep disorders can affect sleep.  Treatment options were discussed  as applicable to patient specific sleep concerns and symptoms. The benefits and potential early side effects of treatment including increased daytime sleepiness were discussed.     Patient was advised to consult with their prescribing provider around use of or changes to prescription sleep medication.  Patient was counseled on the importance of avoiding driving if drowsy.    Services provided are compliant with the requirements of [place] Statute SS 256B.0625 Subd. 3b and paragraph (b)     Follow-up: 3 months     History of Present Sleep Complaint     Renetta Keller is a 48 year old year old female who presents with onset of insomnia with a complicated pregnancy involving three months of bed rest.  This involved muscle weakness and emergenc of joint pain and eventual diagnosis of DJD    She reports that the joint pain resulted in recurrent awakening.    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    Renetta notes overall increase in vigilance since her hospitalization/bedrest requirements of pregnancy.  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    While Renetta reports concerned about her sleep, she does not report sleep specific worry or rumination.  She does describe active mind and having some challenges adequately relaxing before bed.  She notes that she sometimes wakes up wide alert and may have some middle of the night anxiety or preoccupation about work, family or home concerns.    Activities in bed: Read  Prescribed or OTC stimulants: 0  Prescribed or OTC sleep medication: Melatonin spray. Works somewhat, usually uses on nights  Previous sleep medications patients has tried: Benadryl    SLEEP-WAKE SCHEDULE:    Work/School Days: Patient goes to school/work: Yes     Time to Bed:  11:30 to midnight  Sleep Latency: 10-30 minutes to fall asleep  Difficulty falling asleep:  2-3 nights nights per week  Number of awakenings: 3-6 times times per night due to Pain, External stimuli (bed partner, pets, noise, etc), Use the bathroom, Anxiety, Nightmares  Trouble falling back asleep 2-4  times a week   Usually takes 15 min to 1.5 hours to get back to sleep  Rise time: 7:30am  Uses alarm? Yes    Weekends/Non-work Days/All Other Days    Usually gets into bed at 11:30pm to midnight   Sleep latency:  10-45 minutes   Rise time: 7:30 to 8 am   Uses alarm? Yes    Patient sleep estimates:    Average total sleep time:  5.5 to 7 hours per night   Patient estimate of sleep need: 8 hours  Preferred sleep position(s): Back, Side, Head Elevated     Naps    Intentional naps: 4 times a week  Duration:  15-30 minutes in duration  Feels better after a nap:  Yes  Unintentional [place] days per week  Driving accident or near-miss due to sleepiness/drowsiness? No    SLEEP DISRUPTIONS:    Breathing/Snoring    Patient snores:No  Other people complain about Her snoring: No  Patient has been told She stops breathing in Her sleep: No  She has issues with any of the following: Morning headaches, Morning mouth dryness, Stuffy nose when you wake up    Movement:    Patient gets pain, discomfort, with an urge to move: No  Symptoms occur when She is resting: No  Symptoms occur more at night:No  Patient has been told She kicks Her legs at night:No     Behaviors in Sleep:    Renetta Keller has experienced the following behaviors while sleeping: Recurring Nightmares    She has experienced sudden muscle weakness during the day: No    Caffeine, Alcohol and Other Substances:    Number of caffeinated beverages(per day: 2  Last caffeine use is usually: 2pm  Uses alcohol to promote sleep: No  Drinks alcohol near bedtime: No      FAMILY HISTORY OF SLEEP DISORDERS    Patient has a family member been diagnosed with a sleep disorder: No              SCALES     EPWORTH SLEEPINESS SCALE      Sitting and reading 0   Watching TV 0   Sitting, inactive in a public place (theatre or mt.) 0    As a passenger in a car 1   Lying down to rest in the afternoon when circumstance permit 2   Sitting and talking to someone 0   Sitting quietly after lunch without alcohol 0   In a car, while stopped for a few minutes in traffic 0   TOTAL SCORE (nl <11) 3     INSOMNIA SEVERITY INDEX       Difficulty falling asleep 2   Difficult staying asleep 2   Problems waking up to early 0   How SATISFIED/DISSATISFIED are you with your CURRENT sleep pattern? 3   How NOTICEABLE to others do you think your sleep pattern is in terms of your quality of life? 2   How WORRIED/DISTRESSED are you about your current sleep pattern? 2   To what extent do you consider your sleep problem to INTERFERE with your daily fuctioning(e.g.  daytime fatigue, mood, ability to function at work/daily chores, concentration, mood,etc.) CURRENTLY? 3   INSOMNIA SEVERITY INDEX TOTAL SCORE 14    Absence of insomnia (0-7); sub-threshold insomnia (8-14); moderate insomnia (15-21); and severe insomnia (22-28)    STOP BANG     1. Snoring: Do you snore loudly (louder than talking or loud enough to be heard through closed doors)?  No    2. Tired: Do you often feel tired, fatigued, or sleepy during daytime?  Yes    3. Observed: Has anyone observed you stop breathing during your sleep?  No    4. Blood pressure: Do you have or are you being treated for high blood pressure?  No    5. BMI: BMI more than 35 kg/m2?  No    6. Age: Age over 50 yr old?  No    7. Neck circumference: Neck circumference greater than 40 cm?  No    8. Gender: Gender male?  No    STOP-BANG Total Score: 1    ELAINA Risk  0-2 Low risk for ELAINA  3-4  Intermediate risk for ELAINA  5-8 High risk      PATIENT HEALTH QUESTIONNAIRE-9 (PHQ - 9)    Over the last 2 weeks, how often have you been bothered by any of the following problems?    1. Little interest or pleasure in doing things -      2. Feeling down, depressed, or hopeless -      3. Trouble falling or staying asleep, or sleeping too much -     4. Feeling tired or having little energy -      5. Poor appetite or overeating -      6. Feeling bad about yourself - or that you are a failure or have let yourself or your family down -      7. Trouble concentrating on things, such as reading the newspaper or watching television -     8. Moving or speaking so slowly that other people could have noticed? Or the opposite - being so fidgety or restless that you have been moving around a lot more than usual     9. Thoughts that you would be better off dead or of hurting  yourself in some way     Total Score:       If you checked off any problems, how difficult have these problems made it for you to do your work, take care of things at home, or get along with other people?       Developed by Ladan Clemente, Flor Meyers, Nestor Swanson and colleagues, with an educational sully from [employer] No permission required to reproduce, translate, display or distribute. permission required to reproduce, translate, display or distribute.      ANDREW-7        1/19/2023    11:20 AM 8/3/2023    11:38 AM 4/4/2024    11:16 AM   ANDREW-7 SCORE   Total Score 4 (minimal anxiety) 2 (minimal anxiety) 3 (minimal anxiety)   Total Score 4 2 3       CAGE-AID    CAGE- AID Score -        No data to display                CAGE-AID score  > 1 is a positive screen, suggesting further discussion is needed to determine if evaluation for alcohol or substance abuse is appropriate.  A score > 2 is considered clinically significant, suggesting further evaluation of alcohol or substance-related problems is indicated.         Previous Sleep Consultations/Studies     No previous sleep consultation or sleep study.    Vitals     LMP 03/07/2024 (Within Days)      Medical History     Allergies:    Allergies   Allergen Reactions    Ragweeds        Medications:    Current Outpatient Medications   Medication Sig Dispense Refill    [facility] BISMUTH 262 MG chewable tablet       fluticasone (FLONASE) 50 MCG/ACT nasal spray Spray 1 spray into both nostrils daily 9.9 mL 3    SUMAtriptan (IMITREX) 50 MG tablet Take 1 tablet (50 mg) by mouth at onset of headache for migraine May repeat in 2 hours. Max 4 tablets/24 hours. 12 tablet 0    tazarotene (TAZORAC) 0.05 % external cream Externally apply topically At Bedtime Apply to chest and upper back. Stop using if the skin becomes too dry. 60 g 3    traMADol (ULTRAM) 50 MG tablet Take 1-1.5 tablets (50-75 mg) by mouth nightly as needed (for wrist and shoulder pain) 45 tablet 3       Problem List:  Patient Active Problem List    Diagnosis Date Noted    Seasonal allergic rhinitis due to pollen 06/14/2021     Priority: Medium    Cervical high risk HPV (human papillomavirus) test positive  03/04/2021     Priority: Medium     05/15/14 NIL Pap, + HR HPV type unknown. Care everywhere  08/19/16 NIL Pap, Neg HR HPV  02/25/21 NIL Pap, Neg hR HPV. Plan cotest in 3 years.       Chronic, continuous use of opioids 02/26/2021     Priority: Medium     Patient is followed by Kendall Garza MD for ongoing prescription of pain medication.  All refills should only be approved by this provider, or covering partner.    Medication(s): Tramadol 50mg 1-2 times daily  Maximum quantity per month: 40  Clinic visit frequency required: Q 6 months - face to face . Q 3 months - virtual     Controlled substance agreement: on file 08/2020  Pain Clinic evaluation in the past: Yes       Date/Location:       DIRE Total Score(s):  DIRE SCORE 2/25/2021   DIRE Total Score 17       Last [facility] website verification:  Done on 02/25/2021   [email or web address]        Post-nasal drip 08/19/2016     Priority: Medium    Screening for cervical cancer 08/19/2016     Priority: Medium    Migraine without aura and without status migrainosus, not intractable 08/19/2016     Priority: Medium     Patient is followed by Renetta Kumar MD for ongoing prescription of pain medication.  All refills should only be approved by this provider, or covering partner.    Medication(s): Tylenol #3.   Maximum quantity per month: Take 1-2 tablets by mouth Use as needed 1-2 times per month for migraine #20 should last 4 months  Clinic visit frequency required: yearly    Controlled substance agreement:  Encounter-Level CSA - 12/15/16:               Controlled Substance Agreement - Scan on 12/19/2016  1:11 PM : CONTROLLED SUBSTANCE AGREEMENT (below)            Pain Clinic evaluation in the past: No    DIRE Total Score(s):  No flowsheet data found.    Last [facility] website verification:  done on 1/23/2017   [email or web address]        Human papilloma virus (HPV) infection 05/23/2014     Priority: Medium    CARDIOVASCULAR SCREENING; LDL GOAL  LESS THAN 160 10/31/2010     Priority: Medium        Past Medical/Surgical History:  Past Medical History:   Diagnosis Date    Asthma     Cervical high risk HPV (human papillomavirus) test positive 05/15/2014    05/15/14    Depression ages [age]    estrogen induced from OCP    Depressive disorder ages [age]    History of blood transfusion     Miscarriage 2011    1st trimester    PPROM Elevated Risk Factor 2015    Elevated risk with PPROM via Dr Karlos MONGE with persistent subchorionic hemorrhage.   Consult with patient on Betamethasone in 3rd trimester based on this risk unassociated with  ctx.    Please offer bethamethasone in 3rd trimester. (28 weeks)      Ulcer     UTI (lower urinary tract infection)      Patient Active Problem List    Diagnosis Date Noted    Seasonal allergic rhinitis due to pollen 2021     Priority: Medium    Cervical high risk HPV (human papillomavirus) test positive 2021     Priority: Medium     05/15/14 NIL Pap, + HR HPV type unknown. Care everywhere  16 NIL Pap, Neg HR HPV  21 NIL Pap, Neg hR HPV. Plan cotest in 3 years.       Chronic, continuous use of opioids 2021     Priority: Medium     Patient is followed by Kendall Garza MD for ongoing prescription of pain medication.  All refills should only be approved by this provider, or covering partner.    Medication(s): Tramadol 50mg 1-2 times daily  Maximum quantity per month: 40  Clinic visit frequency required: Q 6 months - face to face . Q 3 months - virtual     Controlled substance agreement: on file 2020  Pain Clinic evaluation in the past: Yes       Date/Location:       DIRE Total Score(s):  DIRE SCORE 2021   DIRE Total Score 17       Last [facility] website verification:  Done on 2021   [email or web address]        Post-nasal drip 2016     Priority: Medium    Screening for cervical cancer 2016     Priority: Medium    Migraine without aura and without  status migrainosus, not intractable 08/19/2016     Priority: Medium     Patient is followed by Renetta Kumar MD for ongoing prescription of pain medication.  All refills should only be approved by this provider, or covering partner.    Medication(s): Tylenol #3.   Maximum quantity per month: Take 1-2 tablets by mouth Use as needed 1-2 times per month for migraine #20 should last 4 months  Clinic visit frequency required: yearly    Controlled substance agreement:  Encounter-Level CSA - 12/15/16:               Controlled Substance Agreement - Scan on 12/19/2016  1:11 PM : CONTROLLED SUBSTANCE AGREEMENT (below)            Pain Clinic evaluation in the past: No    DIRE Total Score(s):  No flowsheet data found.    Last [facility] website verification:  done on 1/23/2017   [email or web address]        Human papilloma virus (HPV) infection 05/23/2014     Priority: Medium    CARDIOVASCULAR SCREENING; LDL GOAL LESS THAN 160 10/31/2010     Priority: Medium     Patient Active Problem List   Diagnosis    CARDIOVASCULAR SCREENING; LDL GOAL LESS THAN 160    Post-nasal drip    Screening for cervical cancer    Migraine without aura and without status migrainosus, not intractable    Human papilloma virus (HPV) infection    Chronic, continuous use of opioids    Cervical high risk HPV (human papillomavirus) test positive    Seasonal allergic rhinitis due to pollen        Most Recent Labs:  Lab on 07/27/2023   Component Date Value Ref Range Status    O-Cabrera-Tramadol ng/mL 07/27/2023 652 (H)  <50 ng/mL Final    O-Cabrera-Tramadol 07/27/2023 7,244  Absent ng/mg [creat] Final    O-desmethyltramadol is an expected metabolite of tramadol.    Tramadol ng/mL 07/27/2023 157 (H)  <50 ng/mL Final    Tramadol 07/27/2023 1,744  Absent ng/mg [creat] Final    Sources of tramadol are scheduled prescription medications.    Creatinine Urine for Drug Screen 07/27/2023 9  mg/dL Final    The reference range has not been established for creatinine in  random urines. The results should be integrated into the clinical context for interpretation.       Mental Health History     Prior Mental Health Diagnosis:   None reported, seen for stress/situational stress in the past    Mental Health Treatment:   ; Prior episodes of psychotherapy to address situational stress    Chemical Abuse/Treatment:    None reported    Family History     Family History   Problem Relation Age of Onset    Family History Negative Other     Hypertension Mother     Other Cancer Mother         Melanoma at age 27    Cancer Mother     Family History Negative Father     Coronary Artery Disease Maternal Grandfather     Hypertension Maternal Grandfather     Breast Cancer Maternal Grandmother         Cancer occurred in her late 80s       Social History         Social Determinants of Health     Food Insecurity: Not on file   Depression: Not at risk (4/4/2024)    PHQ-2     PHQ-2 Score: 0   Housing Stability: Not on file   Tobacco Use: Low Risk  (4/4/2024)    Patient History     Smoking Tobacco Use: Never     Smokeless Tobacco Use: Never     Passive Exposure: Not on file   Financial Resource Strain: Not on file   Alcohol Use: Not on file   Transportation Needs: Not on file   Physical Activity: Not on file   Interpersonal Safety: Not on file   Stress: Not on file   Social Connections: Not on file   Health Literacy: Not on file         Mental Status Examination     Renetta presented as oriented X3 with speech and language intact.  The patient was cooperative throughout the evaluation with no signs of hallucinations, delusions, loosening of associations or other thought disturbance.  Mood was normal Affect was congruent with mood. Insight and judgement were intact.  Memory appeared intact for recent and remote elements.  There was no report of suicidal ideation, intention or plan. Attention and concentration were within normal.        Ben Velasco, PsyD, LP, DBSM  Diplomate, Behavioral Sleep  [facility]      Copy:   Kendall Garza MD  [address] [address]    Note: This dictation was created using voice recognition software. This document may contain an error not identified before finalizing the document. If the error changes the accuracy of the document, I would appreciate it being brought to my attention.

## 2024-04-17 ENCOUNTER — OFFICE VISIT (OUTPATIENT)
Dept: DERMATOLOGY | Facility: CLINIC | Age: 49
End: 2024-04-17
Payer: COMMERCIAL

## 2024-04-17 DIAGNOSIS — L98.8 RHYTIDES: Primary | ICD-10-CM

## 2024-04-17 PROCEDURE — 96999 UNLISTED SPEC DERM SVC/PX: CPT | Performed by: DERMATOLOGY

## 2024-04-17 ASSESSMENT — PAIN SCALES - GENERAL: PAINLEVEL: NO PAIN (0)

## 2024-04-17 NOTE — NURSING NOTE
Dermatology Rooming Note    Renetta Keller's goals for this visit include:   Chief Complaint   Patient presents with    Botox     Patient wants glabella done, patient requests to keep costs under $200.         - Does patient need refills? N/A    - Last skin check was N/A    Kyra Randall

## 2024-04-17 NOTE — LETTER
4/17/2024       RE: Renetta Keller  [address]  [facility] [address]     Dear Colleague,    Thank you for referring your patient, Renetta Keller, to the [facility] [place] [facility]. Please see a copy of my visit note below.    Botulinum Injection Procedure Note:  Cosmetic    Dermatology Problem List:  Last skin check 10/25/23, recommend yearly     1. Rhytides and history of migraines  - Current Tx: Tazorac  - Botox 5/31/23, 10/25/23  2. Sebaceous hyperplasia, right forehead  - Future considerations: destruction with hyfrecator  3. Dermatofibroma, left medial ankle     PMHx: Has IUD. Migraines (no other neurologic diseases).  FHx: Melanoma (mother)  SHx: Works at the [employer]    Interval History: Patient has had Botox before. She migraines and it helps with those.Can alternate sites every other time.     SURGEON (S): Dr. Kaelyn Elliott     NURSE:  Rowena Claros CMA     ANESTHESIA:   None    PREOPERATIVE DIAGNOSIS:   Rhytides    LOCATION: upper forehead and glabella    OPERATION/PROCEDURE:   Intralesional botulinum toxin injection     Dilution with 0.6 preserved sterile normal saline in a 50 Unit Botox Vial.    Total units of botulinum toxin: 26    POSTOPERATIVE DIAGNOSIS:   SAME     PREPARATION:   Alcohol swab    DESCRIPTION OF OPERATION/PROCEDURE:   The nature and purpose of the procedure, associated risks including but not limited to bruising, headache or discomfort at the site(s), numbness, muscle twitching, brow or eyelid droop, headache, double vision, not enough effect or too much effect, difficulty whistling or drinking from a straw, loss of muscle tone, or infection. Possible consequences and complications, and alternative methods of treatment were explained in detail. The patient declined a personal or family history of neuromuscular disease prior to the procedure. The patient is not pregnant or breast feeding. A  signed informed operative consent was obtained.    The patient was taken to the operative suite and properly positioned. The area to be treated was defined and confirmed by the patient and physician. The area for Botox injection was marked.    Cosmetic procedure: Injections were performed. The patient tolerated the procedure well and there were no complications noted.       Clinical Follow-Up: 2-3 weeks by phone if needed and otherwise 6 months for repeat botox      The number of units were reduced at the glabella site due to cost, will evaluate if this is effective.     Staff Involved:  Scribe/Staff  Scribe Disclosure:   I, Peri Joseph, am serving as a scribe to document services personally performed by Kaelyn Elliott MD based on data collection and the provider's statements to me.     Provider Disclosure:   The documentation recorded by the scribe accurately reflects the services I personally performed and the decisions made by me.  Kaelyn Elliott MD    Department of Dermatology  [facility]: Phone: [phone], Fax:[phone]  [facility] [place]: Phone: [phone], Fax: [phone]

## 2024-04-24 DIAGNOSIS — G43.009 MIGRAINE WITHOUT AURA AND WITHOUT STATUS MIGRAINOSUS, NOT INTRACTABLE: ICD-10-CM

## 2024-04-24 RX ORDER — SUMATRIPTAN 50 MG/1
50 TABLET, FILM COATED ORAL
Qty: 12 TABLET | Refills: 0 | Status: SHIPPED | OUTPATIENT
Start: 2024-04-24 | End: 2024-07-02

## 2024-05-29 ENCOUNTER — PATIENT OUTREACH (OUTPATIENT)
Dept: CARE COORDINATION | Facility: CLINIC | Age: 49
End: 2024-05-29
Payer: COMMERCIAL

## 2024-06-12 ENCOUNTER — PATIENT OUTREACH (OUTPATIENT)
Dept: CARE COORDINATION | Facility: CLINIC | Age: 49
End: 2024-06-12
Payer: COMMERCIAL

## 2024-06-19 NOTE — NURSING NOTE
Dermatology Rooming Note    Renetta SHANE Lauren's goals for this visit include:   Chief Complaint   Patient presents with    Botox     Renetta is here for her 5 month botox apt. She is content with what we have done previously, and would be open to a little more. She reports no migraines for about 2 months. She states she does not want a scar but is interested in maybe getting rid of lesion on forehead.      Maryellen Ferrer, visit facilitator      Detail Level: Zone Otc Regimen: Eucerin

## 2024-07-02 DIAGNOSIS — G43.009 MIGRAINE WITHOUT AURA AND WITHOUT STATUS MIGRAINOSUS, NOT INTRACTABLE: ICD-10-CM

## 2024-07-03 RX ORDER — SUMATRIPTAN 50 MG/1
50 TABLET, FILM COATED ORAL
Qty: 16 TABLET | Refills: 5 | Status: SHIPPED | OUTPATIENT
Start: 2024-07-03

## 2024-07-24 ENCOUNTER — MYC REFILL (OUTPATIENT)
Dept: FAMILY MEDICINE | Facility: CLINIC | Age: 49
End: 2024-07-24
Payer: COMMERCIAL

## 2024-07-24 DIAGNOSIS — F11.90 CHRONIC, CONTINUOUS USE OF OPIOIDS: ICD-10-CM

## 2024-07-24 DIAGNOSIS — M25.531 RIGHT WRIST PAIN: ICD-10-CM

## 2024-07-24 DIAGNOSIS — M24.011 LOOSE BODY IN SHOULDER JOINT, RIGHT: ICD-10-CM

## 2024-07-24 DIAGNOSIS — M51.369 DDD (DEGENERATIVE DISC DISEASE), LUMBAR: ICD-10-CM

## 2024-07-25 RX ORDER — TRAMADOL HYDROCHLORIDE 50 MG/1
50-75 TABLET ORAL
Qty: 45 TABLET | Refills: 3 | Status: SHIPPED | OUTPATIENT
Start: 2024-07-25 | End: 2024-09-20

## 2024-07-29 ENCOUNTER — HOSPITAL ENCOUNTER (OUTPATIENT)
Dept: MAMMOGRAPHY | Facility: CLINIC | Age: 49
Discharge: HOME | End: 2024-07-29
Attending: FAMILY MEDICINE | Admitting: FAMILY MEDICINE
Payer: COMMERCIAL

## 2024-07-29 DIAGNOSIS — Z12.31 VISIT FOR SCREENING MAMMOGRAM: ICD-10-CM

## 2024-07-29 PROCEDURE — 77063 BREAST TOMOSYNTHESIS BI: CPT

## 2024-08-04 ENCOUNTER — HEALTH MAINTENANCE LETTER (OUTPATIENT)
Age: 49
End: 2024-08-04

## 2024-09-20 ENCOUNTER — OFFICE VISIT (OUTPATIENT)
Dept: FAMILY MEDICINE | Facility: CLINIC | Age: 49
End: 2024-09-20
Attending: FAMILY MEDICINE
Payer: COMMERCIAL

## 2024-09-20 VITALS
DIASTOLIC BLOOD PRESSURE: 71 MMHG | RESPIRATION RATE: 14 BRPM | HEART RATE: 59 BPM | TEMPERATURE: 98.4 F | SYSTOLIC BLOOD PRESSURE: 115 MMHG | OXYGEN SATURATION: 95 % | BODY MASS INDEX: 20.73 KG/M2 | HEIGHT: 69 IN | WEIGHT: 140 LBS

## 2024-09-20 DIAGNOSIS — F11.90 CHRONIC, CONTINUOUS USE OF OPIOIDS: ICD-10-CM

## 2024-09-20 DIAGNOSIS — Z23 ENCOUNTER FOR IMMUNIZATION: ICD-10-CM

## 2024-09-20 DIAGNOSIS — L70.9 ADULT ACNE: ICD-10-CM

## 2024-09-20 DIAGNOSIS — N95.1 PERIMENOPAUSAL: ICD-10-CM

## 2024-09-20 DIAGNOSIS — Z12.4 SCREENING FOR CERVICAL CANCER: ICD-10-CM

## 2024-09-20 DIAGNOSIS — Z00.00 ROUTINE GENERAL MEDICAL EXAMINATION AT A HEALTH CARE FACILITY: Primary | ICD-10-CM

## 2024-09-20 DIAGNOSIS — Z01.84 IMMUNITY STATUS TESTING: ICD-10-CM

## 2024-09-20 DIAGNOSIS — Z12.11 SCREEN FOR COLON CANCER: ICD-10-CM

## 2024-09-20 DIAGNOSIS — M25.531 RIGHT WRIST PAIN: ICD-10-CM

## 2024-09-20 DIAGNOSIS — M51.369 DDD (DEGENERATIVE DISC DISEASE), LUMBAR: ICD-10-CM

## 2024-09-20 DIAGNOSIS — M24.011 LOOSE BODY IN SHOULDER JOINT, RIGHT: ICD-10-CM

## 2024-09-20 LAB
ALBUMIN SERPL BCG-MCNC: 4.5 G/DL (ref 3.5–5.2)
ALP SERPL-CCNC: 55 U/L (ref 40–150)
ALT SERPL W P-5'-P-CCNC: 18 U/L (ref 0–50)
ANION GAP SERPL CALCULATED.3IONS-SCNC: 9 MMOL/L (ref 7–15)
AST SERPL W P-5'-P-CCNC: 26 U/L (ref 0–45)
BASOPHILS # BLD AUTO: 0 10E3/UL (ref 0–0.2)
BASOPHILS NFR BLD AUTO: 1 %
BILIRUB SERPL-MCNC: 0.5 MG/DL
BUN SERPL-MCNC: 14 MG/DL (ref 6–20)
CALCIUM SERPL-MCNC: 9.2 MG/DL (ref 8.8–10.4)
CHLORIDE SERPL-SCNC: 103 MMOL/L (ref 98–107)
CHOLEST SERPL-MCNC: 176 MG/DL
CREAT SERPL-MCNC: 0.7 MG/DL (ref 0.51–0.95)
EGFRCR SERPLBLD CKD-EPI 2021: >90 ML/MIN/1.73M2
EOSINOPHIL # BLD AUTO: 0 10E3/UL (ref 0–0.7)
EOSINOPHIL NFR BLD AUTO: 1 %
ERYTHROCYTE [DISTWIDTH] IN BLOOD BY AUTOMATED COUNT: 12 % (ref 10–15)
EST. AVERAGE GLUCOSE BLD GHB EST-MCNC: 108 MG/DL
ESTRADIOL SERPL-MCNC: 61 PG/ML
FASTING STATUS PATIENT QL REPORTED: YES
FASTING STATUS PATIENT QL REPORTED: YES
FSH SERPL IRP2-ACNC: 66.9 MIU/ML
GLUCOSE SERPL-MCNC: 86 MG/DL (ref 70–99)
HBA1C MFR BLD: 5.4 % (ref 0–5.6)
HCO3 SERPL-SCNC: 28 MMOL/L (ref 22–29)
HCT VFR BLD AUTO: 40.7 % (ref 35–47)
HDLC SERPL-MCNC: 83 MG/DL
HGB BLD-MCNC: 13.6 G/DL (ref 11.7–15.7)
IMM GRANULOCYTES # BLD: 0 10E3/UL
IMM GRANULOCYTES NFR BLD: 0 %
LDLC SERPL CALC-MCNC: 79 MG/DL
LYMPHOCYTES # BLD AUTO: 1.7 10E3/UL (ref 0.8–5.3)
LYMPHOCYTES NFR BLD AUTO: 28 %
MCH RBC QN AUTO: 30.9 PG (ref 26.5–33)
MCHC RBC AUTO-ENTMCNC: 33.4 G/DL (ref 31.5–36.5)
MCV RBC AUTO: 93 FL (ref 78–100)
MONOCYTES # BLD AUTO: 0.3 10E3/UL (ref 0–1.3)
MONOCYTES NFR BLD AUTO: 5 %
NEUTROPHILS # BLD AUTO: 4.2 10E3/UL (ref 1.6–8.3)
NEUTROPHILS NFR BLD AUTO: 67 %
NONHDLC SERPL-MCNC: 93 MG/DL
PLATELET # BLD AUTO: 175 10E3/UL (ref 150–450)
POTASSIUM SERPL-SCNC: 3.8 MMOL/L (ref 3.4–5.3)
PROT SERPL-MCNC: 7.3 G/DL (ref 6.4–8.3)
RBC # BLD AUTO: 4.4 10E6/UL (ref 3.8–5.2)
SODIUM SERPL-SCNC: 140 MMOL/L (ref 135–145)
TRIGL SERPL-MCNC: 70 MG/DL
TSH SERPL DL<=0.005 MIU/L-ACNC: 0.8 UIU/ML (ref 0.3–4.2)
WBC # BLD AUTO: 6.2 10E3/UL (ref 4–11)

## 2024-09-20 PROCEDURE — 90471 IMMUNIZATION ADMIN: CPT | Performed by: FAMILY MEDICINE

## 2024-09-20 PROCEDURE — 86706 HEP B SURFACE ANTIBODY: CPT | Performed by: FAMILY MEDICINE

## 2024-09-20 PROCEDURE — 80053 COMPREHEN METABOLIC PANEL: CPT | Performed by: FAMILY MEDICINE

## 2024-09-20 PROCEDURE — 82670 ASSAY OF TOTAL ESTRADIOL: CPT | Performed by: FAMILY MEDICINE

## 2024-09-20 PROCEDURE — 90673 RIV3 VACCINE NO PRESERV IM: CPT | Performed by: FAMILY MEDICINE

## 2024-09-20 PROCEDURE — 90480 ADMN SARSCOV2 VAC 1/ONLY CMP: CPT | Performed by: FAMILY MEDICINE

## 2024-09-20 PROCEDURE — 36415 COLL VENOUS BLD VENIPUNCTURE: CPT | Performed by: FAMILY MEDICINE

## 2024-09-20 PROCEDURE — 85025 COMPLETE CBC W/AUTO DIFF WBC: CPT | Performed by: FAMILY MEDICINE

## 2024-09-20 PROCEDURE — 87624 HPV HI-RISK TYP POOLED RSLT: CPT | Performed by: FAMILY MEDICINE

## 2024-09-20 PROCEDURE — 99396 PREV VISIT EST AGE 40-64: CPT | Mod: 25 | Performed by: FAMILY MEDICINE

## 2024-09-20 PROCEDURE — 83001 ASSAY OF GONADOTROPIN (FSH): CPT | Performed by: FAMILY MEDICINE

## 2024-09-20 PROCEDURE — 91320 SARSCV2 VAC 30MCG TRS-SUC IM: CPT | Performed by: FAMILY MEDICINE

## 2024-09-20 PROCEDURE — G0481 DRUG TEST DEF 8-14 CLASSES: HCPCS | Performed by: FAMILY MEDICINE

## 2024-09-20 PROCEDURE — 83036 HEMOGLOBIN GLYCOSYLATED A1C: CPT | Performed by: FAMILY MEDICINE

## 2024-09-20 PROCEDURE — 84443 ASSAY THYROID STIM HORMONE: CPT | Performed by: FAMILY MEDICINE

## 2024-09-20 PROCEDURE — G0145 SCR C/V CYTO,THINLAYER,RESCR: HCPCS | Performed by: FAMILY MEDICINE

## 2024-09-20 PROCEDURE — 80061 LIPID PANEL: CPT | Performed by: FAMILY MEDICINE

## 2024-09-20 RX ORDER — LEVONORGESTREL 19.5 MG/1
1 INTRAUTERINE DEVICE INTRAUTERINE ONCE
COMMUNITY
Start: 2019-11-26 | End: 2024-11-26

## 2024-09-20 RX ORDER — TRAMADOL HYDROCHLORIDE 50 MG/1
50-75 TABLET ORAL DAILY
Qty: 45 TABLET | Refills: 3 | Status: SHIPPED | OUTPATIENT
Start: 2024-10-22

## 2024-09-20 RX ORDER — TRAMADOL HYDROCHLORIDE 50 MG/1
50-75 TABLET ORAL
Qty: 55 TABLET | Refills: 0 | Status: SHIPPED | OUTPATIENT
Start: 2024-09-22

## 2024-09-20 ASSESSMENT — PAIN SCALES - GENERAL: PAINLEVEL: SEVERE PAIN (6)

## 2024-09-20 NOTE — LETTER

## 2024-09-20 NOTE — PROGRESS NOTES
Preventive Care Visit  [facility]  Kendall Garza MD, Family Medicine  Sep 20, 2024      Assessment & Plan     Renetta was seen today for physical.    Diagnoses and all orders for this visit:    Routine general medical examination at a health care facility  -     Lipid panel reflex to direct LDL Non-fasting; Future  -     Comprehensive metabolic panel; Future  -     Hemoglobin A1c; Future  -     TSH with free T4 reflex; Future  -     CBC with platelets and differential; Future    Screen for colon cancer  -     Colonoscopy Screening  Referral; Future    Screening for cervical cancer  -     HPV and Gynecologic Cytology Panel - Recommended Age 30 - 65 Years    Encounter for immunization  -     INFLUENZA VACCINE TRIVALENT(FLUBLOK)  -     COVID-19 12+ (PFIZER)    Chronic, continuous use of opioids  -     [id number] - Urine Drug Confirmation Panel (Comprehensive); Future  -     traMADol (ULTRAM) 50 MG tablet; Take 1-1.5 tablets (50-75 mg) by mouth nightly as needed (for wrist and shoulder pain).  -     traMADol (ULTRAM) 50 MG tablet; Take 1-1.5 tablets (50-75 mg) by mouth daily.    Immunity status testing  -     Hepatitis B Surface Antibody; Future    Perimenopausal  -     Estradiol; Future  -     Follicle stimulating hormone; Future    Right wrist pain  -     traMADol (ULTRAM) 50 MG tablet; Take 1-1.5 tablets (50-75 mg) by mouth nightly as needed (for wrist and shoulder pain).    Loose body in shoulder joint, right  -     traMADol (ULTRAM) 50 MG tablet; Take 1-1.5 tablets (50-75 mg) by mouth nightly as needed (for wrist and shoulder pain).  -     traMADol (ULTRAM) 50 MG tablet; Take 1-1.5 tablets (50-75 mg) by mouth daily.    DDD (degenerative disc disease), lumbar  -     traMADol (ULTRAM) 50 MG tablet; Take 1-1.5 tablets (50-75 mg) by mouth nightly as needed (for wrist and shoulder pain).  -     traMADol (ULTRAM) 50 MG tablet; Take 1-1.5 tablets (50-75 mg) by mouth daily.    Adult acne  -      tazarotene (TAZORAC) 0.05 % external cream; Externally apply topically at bedtime. Apply to chest and upper back. Stop using if the skin becomes too dry.    Other orders  -     PRIMARY CARE FOLLOW-UP SCHEDULING  -     PRIMARY CARE FOLLOW-UP SCHEDULING; Future         Follow-up Visit   Expected date:  Sep 20, 2025 (Approximate)      Follow Up Appointment Details:     Follow-up with whom?: PCP    Follow-Up for what?: Adult Preventive    How?: In Person                           Counseling  Appropriate preventive services were addressed with this patient via screening, questionnaire, or discussion as appropriate for fall prevention, nutrition, physical activity, Tobacco-use cessation, social engagement, weight loss and cognition.  Checklist reviewing preventive services available has been given to the patient.  Reviewed patient's diet, addressing concerns and/or questions.     Chencho Jackson is a 49 year old, presenting for the following:  Physical        9/20/2024     1:57 PM   Additional Questions   Roomed by El BOLANOS        Health Care Directive  Patient does not have a Health Care Directive or Living Will: Discussed advance care planning with patient; information given to patient to review.    HPI  Right side pain after a mechanical fall this morning around 10:30 AM  Sleep concerns. for a few years- initially attributed to hip pain. Suspicious about menopause causing symptoms.   Perimenopause - Patient had a cycle in April. Currently has a Kyleena IUD in place. Desires to start HRT in the future.         9/20/2024   General Health   How would you rate your overall physical health? Good   Feel stress (tense, anxious, or unable to sleep) To some extent          9/20/2024   Nutrition   Three or more servings of calcium each day? (!) NO   Diet: Regular (no restrictions)   How many servings of fruit and vegetables per day? 4 or more   How many sweetened beverages each day? 0-1          9/20/2024   Exercise   Days per  week of moderate/strenous exercise 6 days          9/20/2024   Social Factors   Frequency of gathering with friends or relatives Twice a week   Worry food won't last until get money to buy more No   Food not last or not have enough money for food? No   Do you have housing? (Housing is defined as stable permanent housing and does not include staying ouside in a car, in a tent, in an abandoned building, in an overnight shelter, or couch-surfing.) Yes   Are you worried about losing your housing? No   Lack of transportation? No   Unable to get utilities (heat,electricity)? No            9/20/2024   Fall Risk   Fallen 2 or more times in the past year? No   Trouble with walking or balance? No             9/20/2024   Dental   Dentist two times every year? Yes          9/20/2024   TB Screening   Were you born outside of the [place]? No          9/20/2024   Substance Use   Alcohol more than 3/day or more than 7/wk No   Do you use any other substances recreationally? No        Social History     Tobacco Use    Smoking status: Never     Passive exposure: Never    Smokeless tobacco: Never   Substance Use Topics    Alcohol use: Yes     Comment: 1-4 glasses of wine per week    Drug use: Never         7/29/2024   LAST FHS-7 RESULTS   1st degree relative breast or ovarian cancer No   Any relative bilateral breast cancer No   Any male have breast cancer No   Any ONE woman have BOTH breast AND ovarian cancer No   Any woman with breast cancer before 50yrs No   2 or more relatives with breast AND/OR ovarian cancer No   2 or more relatives with breast AND/OR bowel cancer No           Mammogram Screening - Mammogram every 1-2 years updated in Health Maintenance based on mutual decision making        9/20/2024   STI Screening   New sexual partner(s) since last STI/HIV test? (!) YES         History of abnormal Pap smear: No - age 30- 64 PAP with HPV every 5 years recommended        Latest Ref Rng & Units 2/25/2021    11:37 AM 2/25/2021    11:30  "AM 8/19/2016    10:14 AM   PAP / HPV   PAP (Historical)  NIL      HPV 16 DNA NEG^Negative  Negative  Negative    HPV 18 DNA NEG^Negative  Negative  Negative    Other HR HPV NEG^Negative  Negative  Negative      ASCVD Risk   The 10-year ASCVD risk score (Isa LONG, et al., 2019) is: 0.6%    Values used to calculate the score:      Age: 49 years      Sex: Female      Is Non- : No      Diabetic: No      Tobacco smoker: No      Systolic Blood Pressure: 115 mmHg      Is BP treated: No      HDL Cholesterol: 75 mg/dL      Total Cholesterol: 178 mg/dL        9/20/2024   Contraception/Family Planning   Questions about contraception or family planning (!) YES -         Reviewed and updated as needed this visit by Provider   Tobacco  Allergies  Meds  Problems  Med Hx  Surg Hx  Fam Hx            Review of Systems  Constitutional, neuro, ENT, endocrine, pulmonary, cardiac, gastrointestinal, genitourinary, musculoskeletal, integument and psychiatric systems are negative, except as otherwise noted.     Objective    Exam  /71   Pulse 59   Temp 98.4  F (36.9  C) (Temporal)   Resp 14   Ht 1.753 m (5' 9\")   Wt 63.5 kg (140 lb)   LMP 04/16/2024 (Within Weeks)   SpO2 95%   BMI 20.67 kg/m     Estimated body mass index is 20.67 kg/m  as calculated from the following:    Height as of this encounter: 1.753 m (5' 9\").    Weight as of this encounter: 63.5 kg (140 lb).    Physical Exam  GENERAL: alert and no distress  EYES: Eyes grossly normal to inspection, PERRL and conjunctivae and sclerae normal  HENT: ear canals and TM's normal, nose and mouth without ulcers or lesions  NECK: no adenopathy, no asymmetry, masses, or scars  RESP: lungs clear to auscultation - no rales, rhonchi or wheezes  CV: regular rate and rhythm, normal S1 S2, no S3 or S4, no murmur, click or rub, no peripheral edema  ABDOMEN: soft, nontender, no hepatosplenomegaly, no masses and bowel sounds normal   (female): " normal female external genitalia, normal urethral meatus, normal vaginal mucosa  MS: no gross musculoskeletal defects noted, no edema  SKIN: no suspicious lesions or rashes  NEURO: Normal strength and tone, mentation intact and speech normal  PSYCH: mentation appears normal, affect normal/bright        Signed Electronically by: Kendall Garza MD

## 2024-09-20 NOTE — PATIENT INSTRUCTIONS
Patient Education   Preventive Care Advice   This is general advice given by our system to help you stay healthy. However, your care team may have specific advice just for you. Please talk to your care team about your preventive care needs.  Nutrition  Eat 5 or more servings of fruits and vegetables each day.  Try wheat bread, brown rice and whole grain pasta (instead of white bread, rice, and pasta).  Get enough calcium and vitamin D. Check the label on foods and aim for 100% of the RDA (recommended daily allowance).  Lifestyle  Exercise at least 150 minutes each week  (30 minutes a day, 5 days a week).  Do muscle strengthening activities 2 days a week. These help control your weight and prevent disease.  No smoking.  Wear sunscreen to prevent skin cancer.  Have a dental exam and cleaning every 6 months.  Yearly exams  See your health care team every year to talk about:  Any changes in your health.  Any medicines your care team has prescribed.  Preventive care, family planning, and ways to prevent chronic diseases.  Shots (vaccines)   HPV shots (up to age 26), if you've never had them before.  Hepatitis B shots (up to age 59), if you've never had them before.  COVID-19 shot: Get this shot when it's due.  Flu shot: Get a flu shot every year.  Tetanus shot: Get a tetanus shot every 10 years.  Pneumococcal, hepatitis A, and RSV shots: Ask your care team if you need these based on your risk.  Shingles shot (for age 50 and up)  General health tests  Diabetes screening:  Starting at age 35, Get screened for diabetes at least every 3 years.  If you are younger than age 35, ask your care team if you should be screened for diabetes.  Cholesterol test: At age 39, start having a cholesterol test every 5 years, or more often if advised.  Bone density scan (DEXA): At age 50, ask your care team if you should have this scan for osteoporosis (brittle bones).  Hepatitis C: Get tested at least once in your life.  STIs (sexually  transmitted infections)  Before age 24: Ask your care team if you should be screened for STIs.  After age 24: Get screened for STIs if you're at risk. You are at risk for STIs (including HIV) if:  You are sexually active with more than one person.  You don't use condoms every time.  You or a partner was diagnosed with a sexually transmitted infection.  If you are at risk for HIV, ask about PrEP medicine to prevent HIV.  Get tested for HIV at least once in your life, whether you are at risk for HIV or not.  Cancer screening tests  Cervical cancer screening: If you have a cervix, begin getting regular cervical cancer screening tests starting at age 21.  Breast cancer scan (mammogram): If you've ever had breasts, begin having regular mammograms starting at age 40. This is a scan to check for breast cancer.  Colon cancer screening: It is important to start screening for colon cancer at age 45.  Have a colonoscopy test every 10 years (or more often if you're at risk) Or, ask your provider about stool tests like a FIT test every year or Cologuard test every 3 years.  To learn more about your testing options, visit:   .  For help making a decision, visit:   https://bit.ly/hs34105.  Prostate cancer screening test: If you have a prostate, ask your care team if a prostate cancer screening test (PSA) at age 55 is right for you.  Lung cancer screening: If you are a current or former smoker ages 50 to 80, ask your care team if ongoing lung cancer screenings are right for you.  For informational purposes only. Not to replace the advice of your health care provider. Copyright   2023 [facility]. All rights reserved. Clinically reviewed by the [facility]. archify [id number] - REV 01/24.  Safer Sex: Care Instructions  Overview  Safer sex is a way to reduce your risk of getting a sexually transmitted infection (STI). It can also help prevent pregnancy.  Several products can help you practice  safer sex and reduce your chance of STIs. One of the best is a condom. There are internal and external condoms. You can use a special rubber sheet (dental dam) for protection during oral sex. Disposable gloves can keep your hands from touching blood, semen, or other body fluids that can carry infections.  Remember that birth control methods such as diaphragms, IUDs, foams, and birth control pills do not stop you from getting STIs.  Follow-up care is a key part of your treatment and safety. Be sure to make and go to all appointments, and call your doctor if you are having problems. It's also a good idea to know your test results and keep a list of the medicines you take.  How can you care for yourself at home?  Think about getting vaccinated to help prevent hepatitis A, hepatitis B, and human papillomavirus (HPV). They can be spread through sex.  Use a condom every time you have sex. Use an external condom, which goes on the penis. Or use an internal condom, which goes into the vagina or anus.  Make sure you use the right size external condom. A condom that's too small can break easily. A condom that's too big can slip off during sex.  Use a new condom each time you have sex. Be careful not to poke a hole in the condom when you open the wrapper.  Don't use an internal condom and an external condom at the same time.  Never use petroleum jelly (such as Vaseline), grease, hand lotion, baby oil, or anything with oil in it. These products can make holes in the condom.  After intercourse, hold the edge of the condom as you remove it. This will help keep semen from spilling out of the condom.  Do not have sex with anyone who has symptoms of an STI, such as sores on the genitals or mouth.  Do not drink a lot of alcohol or use drugs before sex.  Limit your sex partners. Sex with one partner who has sex only with you can reduce your risk of getting an STI.  Don't share sex toys. But if you do share them, use a condom and clean  "the sex toys between each use.  Talk to any partners before you have sex. Talk about what you feel comfortable with and whether you have any boundaries with sex. And find out if your partner or partners may be at risk for any STI. Keep in mind that a person may be able to spread an STI even if they do not have symptoms. You and any partners may want to get tested for STIs.  Where can you learn more?  Go to https://www.Be Spotted.net/patiented  Enter B608 in the search box to learn more about \"Safer Sex: Care Instructions.\"  Current as of: November 27, 2023               Content Version: 14.0    8612-5905 [employer].   Care instructions adapted under license by your healthcare professional. If you have questions about a medical condition or this instruction, always ask your healthcare professional. [employer] disclaims any warranty or liability for your use of this information.         "

## 2024-09-20 NOTE — LETTER

## 2024-09-23 ENCOUNTER — OFFICE VISIT (OUTPATIENT)
Dept: OPHTHALMOLOGY | Facility: CLINIC | Age: 49
End: 2024-09-23
Attending: OPTOMETRIST
Payer: COMMERCIAL

## 2024-09-23 DIAGNOSIS — H52.4 PRESBYOPIA: Primary | ICD-10-CM

## 2024-09-23 DIAGNOSIS — H52.13 MYOPIA OF BOTH EYES: ICD-10-CM

## 2024-09-23 LAB
CREAT UR-MCNC: 59 MG/DL
HBV SURFACE AB SERPL IA-ACNC: <3.5 M[IU]/ML
HBV SURFACE AB SERPL IA-ACNC: NONREACTIVE M[IU]/ML
HPV HR 12 DNA CVX QL NAA+PROBE: NEGATIVE
HPV16 DNA CVX QL NAA+PROBE: NEGATIVE
HPV18 DNA CVX QL NAA+PROBE: NEGATIVE
HUMAN PAPILLOMA VIRUS FINAL DIAGNOSIS: NORMAL

## 2024-09-23 PROCEDURE — 92002 INTRM OPH EXAM NEW PATIENT: CPT | Performed by: OPTOMETRIST

## 2024-09-23 PROCEDURE — 99213 OFFICE O/P EST LOW 20 MIN: CPT | Performed by: OPTOMETRIST

## 2024-09-23 ASSESSMENT — CONF VISUAL FIELD
OS_SUPERIOR_NASAL_RESTRICTION: 0
OS_INFERIOR_NASAL_RESTRICTION: 0
OD_INFERIOR_TEMPORAL_RESTRICTION: 0
METHOD: COUNTING FINGERS
OS_INFERIOR_TEMPORAL_RESTRICTION: 0
OD_SUPERIOR_NASAL_RESTRICTION: 0
OD_INFERIOR_NASAL_RESTRICTION: 0
OS_NORMAL: 1
OD_SUPERIOR_TEMPORAL_RESTRICTION: 0
OS_SUPERIOR_TEMPORAL_RESTRICTION: 0
OD_NORMAL: 1

## 2024-09-23 ASSESSMENT — CUP TO DISC RATIO
OS_RATIO: 0.2
OD_RATIO: 0.2

## 2024-09-23 ASSESSMENT — REFRACTION_MANIFEST
OD_CYLINDER: +0.25
OD_SPHERE: -0.25
OD_ADD: +1.75
OD_AXIS: 156
OS_CYLINDER: SPHERE
OS_ADD: +1.75
OS_SPHERE: -0.25

## 2024-09-23 ASSESSMENT — VISUAL ACUITY
OS_SC: 20/20
METHOD: SNELLEN - LINEAR
OD_SC: 20/20

## 2024-09-23 ASSESSMENT — TONOMETRY
IOP_METHOD: TONOPEN
OS_IOP_MMHG: 10
OD_IOP_MMHG: 8

## 2024-09-23 ASSESSMENT — SLIT LAMP EXAM - LIDS
COMMENTS: NORMAL
COMMENTS: NORMAL

## 2024-09-23 ASSESSMENT — EXTERNAL EXAM - RIGHT EYE: OD_EXAM: NORMAL

## 2024-09-23 ASSESSMENT — EXTERNAL EXAM - LEFT EYE: OS_EXAM: NORMAL

## 2024-09-23 NOTE — NURSING NOTE
Chief Complaints and History of Present Illnesses   Patient presents with    Decreased Vision Evaluation     Chief Complaint(s) and History of Present Illness(es)       Decreased Vision Evaluation              Laterality: both eyes              Comments    Renetta Keller is being seen for a consult today by the request of Dr. Garza for evaluation of possible decreased near vision.    Patient states has recently noticed a change with reading at near, has to hold material out further. Distance vision has been clear both eyes. Has not worn any correction in the past. H/o migraines with visual disturbances. Increase in headaches over the past year per patient. No ocular pain currently. With headaches, will get some achiness around left eye area. No dryness. Does not use any eyedrops. No past ocular surgeries. H/o corneal abrasion left eye from childhood. No DM.    Kaylene Wise on 9/23/2024 at 1:45 PM

## 2024-09-23 NOTE — PROGRESS NOTES
Assessment & Plan       Renetta Keller is a 49 year old female with the following diagnoses:   1. Presbyopia - Both Eyes    2. Myopia of both eyes - Both Eyes          Near blur now   No RX at dist  Some tear film changes    Recheck 1-2 yrs    Patient disposition:   No follow-ups on file.          Complete documentation of historical and exam elements from today's encounter can be found in the full encounter summary report (not reduplicated in this progress note). I personally obtained the chief complaint(s) and history of present illness.  I confirmed and edited as necessary the review of systems, past medical/surgical history, family history, social history, and examination findings as documented by others; and I examined the patient myself. I personally reviewed the relevant tests, images, and reports as documented above. I formulated and edited as necessary the assessment and plan and discussed the findings and management plan with the patient and family.  Dr. Eris Hidalgo

## 2024-09-24 LAB
N-NORTRAMADOL/CREAT UR CFM: ABNORMAL NG/MG {CREAT}
O-NORTRAMADOL UR CFM-MCNC: 6800 NG/ML
TRAMADOL CTO UR CFM-MCNC: 3180 NG/ML
TRAMADOL/CREAT UR: 5390 NG/MG {CREAT}

## 2024-09-25 LAB
BKR AP ASSOCIATED HPV REPORT: NORMAL
BKR LAB AP GYN ADEQUACY: NORMAL
BKR LAB AP GYN INTERPRETATION: NORMAL
BKR LAB AP GYN OTHER FINDINGS: NORMAL
BKR LAB AP PREVIOUS ABNORMAL: NORMAL
PATH REPORT.COMMENTS IMP SPEC: NORMAL
PATH REPORT.COMMENTS IMP SPEC: NORMAL
PATH REPORT.RELEVANT HX SPEC: NORMAL

## 2024-09-25 NOTE — RESULT ENCOUNTER NOTE
Dear Renetta,   Here are your recent results:  1- Your FSH is high and your estrogen is okay. This is consistent with perimenopause. If you complete 12 consecutive months without a menstrual cycle you will be considered postmenopausal.   2- Your results revealed that you do not have immunity against hepatitis B. Lack of immunity puts you at risk of rony hepatitis B infection in the future.  I would recommend scheduling a nurse only visit for immunizations. We have Engerix-B at our clinic and it is a three dose series given at 0, 1, 6 months.    All other labs are normal.     Best Regards  Kendall Garza MD

## 2024-09-29 NOTE — RESULT ENCOUNTER NOTE
"Dear Renetta,     Good news!  Your Pap smear was normal.  You are due for routine screening after 5 years.  There was an incidental finding of a bacteria \"Actinomyces.\" This is a very common bacteria in patients with IUD.  It does not cause any symptoms and does not require any treatment at this time.    Best Regards  Kendall Garza MD"

## 2024-10-04 ENCOUNTER — HOSPITAL ENCOUNTER (OUTPATIENT)
Facility: CLINIC | Age: 49
End: 2024-10-04
Attending: INTERNAL MEDICINE | Admitting: INTERNAL MEDICINE
Payer: COMMERCIAL

## 2024-10-04 ENCOUNTER — TELEPHONE (OUTPATIENT)
Dept: GASTROENTEROLOGY | Facility: CLINIC | Age: 49
End: 2024-10-04
Payer: COMMERCIAL

## 2024-10-04 NOTE — TELEPHONE ENCOUNTER
"Endoscopy Scheduling Screen    Have you had any respiratory illness or flu-like symptoms in the last 10 days?  No    What is your communication preference for Instructions and/or Bowel Prep?   MyChart    What insurance is in the chart?  Other:  [employer]    Ordering/Referring Provider: Kendall Garza MD in [facility]     (If ordering provider performs procedure, schedule with ordering provider unless otherwise instructed. )    BMI: Estimated body mass index is 20.67 kg/m  as calculated from the following:    Height as of 9/20/24: 1.753 m (5' 9\").    Weight as of 9/20/24: 63.5 kg (140 lb).     Sedation Ordered  moderate sedation.   If patient BMI > 50 do not schedule in [facility].    If patient BMI > 45 do not schedule at [facility].    Are you taking methadone or Suboxone?  NO, No RN review required.    Have you been diagnosed and are being treated for severe PTSD or severe anxiety?  NO, No RN review required.    Are you taking any prescription medications for pain 3 or more times per week?   YES, Schedule with [facility]. (If patient has additional questions please InBasket to RN pool for .)    Do you have a history of malignant hyperthermia?  No    (Females) Are you currently pregnant?   No     Have you been diagnosed or told you have pulmonary hypertension?   No    Do you have an LVAD?  No    Have you been told you have moderate to severe sleep apnea?  No.    Have you been told you have COPD, asthma, or any other lung disease?  No    Do you have any heart conditions?  No     Have you ever had or are you waiting for an organ transplant?  No. Continue scheduling, no site restrictions.    Have you had a stroke or transient ischemic attack (TIA aka \"mini stroke\" in the last 6 months?   No    Have you been diagnosed with or been told you have cirrhosis of the liver?   No.    Are you currently on dialysis?   No    Do you need assistance transferring?   No    BMI: Estimated body mass index is 20.67 kg/m  as calculated " "from the following:    Height as of 9/20/24: 1.753 m (5' 9\").    Weight as of 9/20/24: 63.5 kg (140 lb).     Is patients BMI > 40 and scheduling location [facility]?  No    Do you take an injectable or oral medication for weight loss or diabetes (excluding insulin)?  No    Do you take the medication Naltrexone?  No    Do you take blood thinners?  No       Prep   Are you currently on dialysis or do you have chronic kidney disease?  No    Do you have a diagnosis of diabetes?  No    Do you have a diagnosis of cystic fibrosis (CF)?  No    On a regular basis do you go 3 -5 days between bowel movements?  No    BMI > 40?  No    Preferred Pharmacy:      [id number] - [facility] [address] [address]  Phone: [phone] Fax: [phone]      Final Scheduling Details     Procedure scheduled  Colonoscopy    Surgeon:  VLADIMIR     Date of procedure:  1/30     Pre-OP / PAC:   Yes - Patient informed of pre-op requirement.    Location  [facility] - Patient preference.    Sedation   [facility]/Deep Sedation - Per exclusion criteria.      Patient Reminders:   You will receive a call from a Nurse to review instructions and health history.  This assessment must be completed prior to your procedure.  Failure to complete the Nurse assessment may result in the procedure being cancelled.      On the day of your procedure, please designate an adult(s) who can drive you home stay with you for the next 24 hours. The medicines used in the exam will make you sleepy. You will not be able to drive.      You cannot take public transportation, ride share services, or non-medical taxi service without a responsible caregiver.  Medical transport services are allowed with the requirement that a responsible caregiver will receive you at your destination.  We require that drivers and caregivers are confirmed prior to your procedure.    "

## 2024-10-11 ENCOUNTER — MYC MEDICAL ADVICE (OUTPATIENT)
Dept: FAMILY MEDICINE | Facility: CLINIC | Age: 49
End: 2024-10-11
Payer: COMMERCIAL

## 2024-10-11 NOTE — TELEPHONE ENCOUNTER
Our last visit was for a physical, we did not discuss it in details. I would recommend scheduling a visit. Virtual visit or E-visit.     MD Kayla

## 2024-11-21 NOTE — TELEPHONE ENCOUNTER
DIAGNOSIS: I have an acute hand/wrist stress injury from computer use, which may be exacerbated by degenerative shoulder issues and a pinched ulnar nerve, all on my right side.     APPOINTMENT DATE: 11.26.24   NOTES STATUS DETAILS   OFFICE NOTE from other specialist Internal 9.20.24, 4.4.24 + more  Kayla MCKEON   MEDICATION LIST Internal    XRAYS (IMAGES & REPORTS) Internal 9.23.21  XR Hand Right    5.25.17  XR Wrist Right

## 2024-11-25 NOTE — PATIENT INSTRUCTIONS
Botulinum Toxin (Botox/Dysport) Cosmetic Information    Risks: I will have pain, redness, and swelling. I may have bruising, headache or discomfort at the site(s). Risks are asymmetry, numbness, twitching, brow droop, eyelid droop, headache, double vision, not enough effect or too much effect, difficulty whistling or drinking, loss of muscle tone, headache or infection. A touch-up or multiple treatments may be required.    About Botulinum Toxin (Botox/Dysport)  You have inquired about Botox cosmetic. Botulinum toxin is a purified protein derivative developed from bacteria. It has the ability to immobilize facial muscles that create dynamic wrinkles. Dynamic wrinkles develop due to muscle contraction, and over time become permanent folds in the skin, even when the muscles are not flexed. The use of Botox results in a very pleasing cosmetic effect for many people, leading to a more youthful, relaxed appearance. Botox can be used in combination with injectable fillers, chemical peels, and laser resurfacing to treat deeper wrinkles. It also has become an accepted form of treatment for hyperhidrosis, (or excessive sweating) in people who have not responded to other therapies.     With my treatment side effects may include bruising, headache or discomfort at the site(s). Asymmetry may occur and a touch-up may be required. Risks of this procedure include numbness, muscle twitching, brow or eyelid droop, headache, double vision, not enough effect or too much effect, difficulty whistling or drinking from a straw, loss of muscle tone, headache or infection.    Post-Procedure Instructions:   Do not rub the treated area. Avoid exercise for the 24 hours following the procedure. You may move the muscles. Some people will experience bruising or eyelid ptosis (drooping) after injection. This is temporary and usually mild. Eyelid ptosis may be treated with special eye drops. Call your doctor if you have any questions or concerns  after your treatment.     Do not massage the area for 24 hours  Stay upright for 4 hours  Do not push on the injected area  Contact us if you have asymmetry at 2 weeks  Do not apply skin products to the area for 24 hours    Who should I call with questions?  [facility]: [phone]  [facility]: [phone]  For urgent needs outside of business hours call the [facility] at [phone] and ask for the resident on call  MyChart messaging may be delayed

## 2024-11-25 NOTE — PROGRESS NOTES
Botulinum Injection Procedure Note:  Cosmetic      Dermatology Problem List:  Last skin check 10/25/23, recommend yearly     1. Rhytides and history of migraines  - Current Tx: Tazorac  - Botox 5/31/23, 10/25/23, 4/17/24, 11/25/24  2. Sebaceous hyperplasia, right forehead  - Future considerations: destruction with hyfrecator  3. Dermatofibroma, left medial ankle     PMHx: Has IUD. Migraines (no other neurologic diseases).  FHx: Melanoma (mother)  SHx: Works at the [employer]    Patient has migraines and Botox helps with those. Can alternate sites every other time.      SURGEON (S): Dr. Kaelyn Elliott        ANESTHESIA:   None    PREOPERATIVE DIAGNOSIS:   Rhytides    LOCATION: upper forehead, glabella, perioral        OPERATION/PROCEDURE:   Intralesional botulinum toxin injection     Dilution with 0.6 preserved sterile normal saline in a 50 Unit Botox Vial.    Total units of botulinum toxin: 29 (charged for 20)    POSTOPERATIVE DIAGNOSIS:   SAME     PREPARATION:   Alcohol swab    DESCRIPTION OF OPERATION/PROCEDURE:   The nature and purpose of the procedure, associated risks including but not limited to bruising, headache or discomfort at the site(s), numbness, muscle twitching, brow or eyelid droop, headache, double vision, not enough effect or too much effect, difficulty whistling or drinking from a straw, loss of muscle tone, or infection. Possible consequences and complications, and alternative methods of treatment were explained in detail. The patient declined a personal or family history of neuromuscular disease prior to the procedure.  The patient is not pregnant or breast feeding.    A signed informed operative consent was obtained.    The patient was taken to the operative suite and properly positioned. The area to be treated was defined and confirmed by the patient and physician. The area for Botox injection was marked.    Cosmetic procedure: Injections were performed. The patient tolerated the procedure well  and there were no complications noted.         Clinical Follow-Up:  2-3 weeks by phone if needed and otherwise 3-6 months for repeat botox      Staff Involved:  Scribe/Staff  Scribe Disclosure:   I, Peri Joseph, am serving as a scribe to document services personally performed by Kaelyn Elliott MD based on data collection and the provider's statements to me.     Provider Disclosure:   The documentation recorded by the scribe accurately reflects the services I personally performed and the decisions made by me.    Kaelyn Elliott MD    Department of Dermatology  [facility]: Phone: [phone], Fax:[phone]  [facility]: Phone: [phone], Fax: [phone]

## 2024-11-26 ENCOUNTER — OFFICE VISIT (OUTPATIENT)
Dept: ORTHOPEDICS | Facility: CLINIC | Age: 49
End: 2024-11-26
Payer: COMMERCIAL

## 2024-11-26 ENCOUNTER — PRE VISIT (OUTPATIENT)
Dept: ORTHOPEDICS | Facility: CLINIC | Age: 49
End: 2024-11-26

## 2024-11-26 DIAGNOSIS — G56.21 ULNAR NEUROPATHY AT ELBOW, RIGHT: ICD-10-CM

## 2024-11-26 DIAGNOSIS — M77.8 TENDINITIS OF RIGHT WRIST: Primary | ICD-10-CM

## 2024-11-26 DIAGNOSIS — R20.2 PARESTHESIAS IN RIGHT HAND: ICD-10-CM

## 2024-11-26 NOTE — PROGRESS NOTES
CHIEF COMPLAINT:  Pain of the Right Hand     HISTORY OF PRESENT ILLNESS  Ms. Keller is a pleasant 49 year old year old female who presents to clinic today with right wrist pain.  Renetta explains that she has been having right hand/wrist pain/numbness/tingling for the last 6 months with no injury.     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  She states that she use a program called canvas in which she is frequently swiping with her wrist and clicking rather than simply typing.  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    Onset: gradual  Location: right wrist/hand  Quality:  achy, numb, tingling  Duration: 6 months   Severity: 10/10 at worst  Timing: Mouse work, gripping, cutting veggies  Modifying factors:  resting and non-use makes it better, movement and use makes it worse  Associated signs & symptoms: pain and swelling  Previous similar pain: Yes, previously diagnosed with DeQuervains   Treatments to date: Brace    Additional history: as documented    Review of Systems:  Have you recently had a a fever, chills, weight loss? No  Do you have any vision problems? No  Do you have any chest pain or edema? No  Do you have any shortness of breath or wheezing?  No  Do you have stomach problems? No  Do you have any numbness or focal weakness? No  Do you have diabetes? No  Do you have problems with bleeding or clotting? No  Do you have an rashes or other skin lesions? No    MEDICAL HISTORY  Patient Active Problem List   Diagnosis    CARDIOVASCULAR SCREENING; LDL GOAL LESS THAN 160    Post-nasal drip    Screening for cervical cancer    Migraine without aura and without status migrainosus, not intractable    Human papilloma virus (HPV) infection     "Chronic, continuous use of opioids    Cervical high risk HPV (human papillomavirus) test positive    Seasonal allergic rhinitis due to pollen       Current Outpatient Medications   Medication Sig Dispense Refill    [employer] BISMUTH 262 MG chewable tablet       fluticasone (FLONASE) 50 MCG/ACT nasal spray Spray 1 spray into both nostrils daily 9.9 mL 3    levonorgestrel (KYLEENA) 19.5 MG IUD 1 each by Intrauterine route once.      SUMAtriptan (IMITREX) 50 MG tablet Take 1 tablet (50 mg) by mouth at onset of headache for migraine May repeat in 2 hours. Max 4 tablets/24 hours. 16 tablet 5    tazarotene (TAZORAC) 0.05 % external cream Externally apply topically at bedtime. Apply to chest and upper back. Stop using if the skin becomes too dry. 60 g 3    traMADol (ULTRAM) 50 MG tablet Take 1-1.5 tablets (50-75 mg) by mouth nightly as needed (for wrist and shoulder pain). 55 tablet 0    traMADol (ULTRAM) 50 MG tablet Take 1-1.5 tablets (50-75 mg) by mouth daily. 45 tablet 3       Allergies   Allergen Reactions    Ragweeds        Family History   Problem Relation Age of Onset    Hypertension Mother     Other Cancer Mother         Melanoma at age 27    Cancer Mother     Macular Degeneration Father         injury related \"considered blind in one eye\"    Family History Negative Father     Breast Cancer Maternal Grandmother         Cancer occurred in her late 80s    Coronary Artery Disease Maternal Grandfather     Hypertension Maternal Grandfather     Family History Negative Other     Glaucoma No family hx of        Additional medical/Social/Surgical [facility] and updated as appropriate.       PHYSICAL EXAM  There were no vitals taken for this visit.    General  - normal appearance, in no obvious distress  Musculoskeletal - Right wrist  - inspection: no atrophy, normal joint alignment, no swelling  - palpation: mild tenderness soft tissue tendons over the radiocarpal joint, no bony or soft tissue tenderness, no " tenderness at the anatomical snuffbox  - ROM:  90 deg flexion   70 deg extension   25 deg abduction   65 deg adduction  - strength: 5/5  strength, 5/5 wrist abduction, 5/5 flexion, extension, pronation, supination, adduction  - special tests:  (+) Tinel's right elbow   (+) Finkelstein mild  (+) Phalen and carpal compression at median nerve  (-) Plaza click test  (-) ulnar impaction  Neuro  - no numbness, no motor deficit, grossly normal coordination, normal muscle tone    IMAGING :  XR HAND RT G/E 3 VW   9/23/2021 8:16 AM      HISTORY:  . Problem/pain.                                                                      IMPRESSION: Unremarkable exam.     CHICA CEDENO MD         ASSESSMENT & PLAN  Ms. Keller is a 49 year old year old female with past medical history of tendinitis of right wrist, EMG diagnosed mild ulnar neuropath who presents to clinic today with worsening right wrist pain. Additional paresthesias of hand concerning for new carpal tunnel syndrome    Diagnosis:   Tendinitis of right wrist  Ulnar neuritis of right elbow  Paresthesias right wrist    Additional diagnostic testing reviewed such as updated EMG discussed. Due to high level of discomfort with last emg, we opted to hold off.     Start occupational hand therapy for tendinitis, ulnar neuritis, and presumed carpal tunnel symptoms.  Wear thumb spica wrist brace at night to determine whether paresthesias and tendinitis improve.  Wear thumb based brace with Canvas use as able.  Trial of diclofenac gel.    Of note previous corticosteroid injection at first dorsal compartment caused significant skin atrophy performed in 2016. Will try to avoid repeat injection superficially.    If no improvement in symptoms will reevaluate need for repeat EMG, consider MRI wrist, and/or discuss diagnostic injection such as carpal tunnel injection.    I support reduced work with Canvas when able and I can support her needs at [place] due to medical  necessity.    It was a pleasure seeing Renetta today.    Silver Cortez DO, CAM  Primary Care Sports Medicine

## 2024-11-26 NOTE — LETTER
11/26/2024      RE: Renetta Keller  [address]  [facility] [address]     Dear Colleague,    Thank you for referring your patient, Renetta Keller, to the [facility] [place]. Please see a copy of my visit note below.    CHIEF COMPLAINT:  Pain of the Right Hand     HISTORY OF PRESENT ILLNESS  Ms. Keller is a pleasant 49 year old year old female who presents to clinic today with right wrist pain.  Renetta explains that she has been having right hand/wrist pain/numbness/tingling for the last 6 months with no injury.     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  She states that she use a program called canvas in which she is frequently swiping with her wrist and clicking rather than simply typing.  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    Onset: gradual  Location: right wrist/hand  Quality:  achy, numb, tingling  Duration: 6 months   Severity: 10/10 at worst  Timing: Mouse work, gripping, cutting veggies  Modifying factors:  resting and non-use makes it better, movement and use makes it worse  Associated signs & symptoms: pain and swelling  Previous similar pain: Yes, previously diagnosed with DeQuervains   Treatments to date: Brace    Additional history: as documented    Review of Systems:  Have you recently had a a fever, chills, weight loss? No  Do you have any vision problems? No  Do you have any chest pain or edema? No  Do you have any shortness of breath or wheezing?  No  Do you have stomach problems? No  Do you have any numbness or focal weakness? No  Do you have diabetes? No  Do you have problems with bleeding or clotting? No  Do you have an rashes or other skin lesions? No    MEDICAL  "HISTORY  Patient Active Problem List   Diagnosis     CARDIOVASCULAR SCREENING; LDL GOAL LESS THAN 160     Post-nasal drip     Screening for cervical cancer     Migraine without aura and without status migrainosus, not intractable     Human papilloma virus (HPV) infection     Chronic, continuous use of opioids     Cervical high risk HPV (human papillomavirus) test positive     Seasonal allergic rhinitis due to pollen       Current Outpatient Medications   Medication Sig Dispense Refill     [employer]S BISMUTH 262 MG chewable tablet        fluticasone (FLONASE) 50 MCG/ACT nasal spray Spray 1 spray into both nostrils daily 9.9 mL 3     levonorgestrel (KYLEENA) 19.5 MG IUD 1 each by Intrauterine route once.       SUMAtriptan (IMITREX) 50 MG tablet Take 1 tablet (50 mg) by mouth at onset of headache for migraine May repeat in 2 hours. Max 4 tablets/24 hours. 16 tablet 5     tazarotene (TAZORAC) 0.05 % external cream Externally apply topically at bedtime. Apply to chest and upper back. Stop using if the skin becomes too dry. 60 g 3     traMADol (ULTRAM) 50 MG tablet Take 1-1.5 tablets (50-75 mg) by mouth nightly as needed (for wrist and shoulder pain). 55 tablet 0     traMADol (ULTRAM) 50 MG tablet Take 1-1.5 tablets (50-75 mg) by mouth daily. 45 tablet 3       Allergies   Allergen Reactions     Ragweeds        Family History   Problem Relation Age of Onset     Hypertension Mother      Other Cancer Mother         Melanoma at age 27     Cancer Mother      Macular Degeneration Father         injury related \"considered blind in one eye\"     Family History Negative Father      Breast Cancer Maternal Grandmother         Cancer occurred in her late 80s     Coronary Artery Disease Maternal Grandfather      Hypertension Maternal Grandfather      Family History Negative Other      Glaucoma No family hx of        Additional medical/Social/Surgical histories reviewed in [facility] and updated as appropriate.       PHYSICAL EXAM  There were no " vitals taken for this visit.    General  - normal appearance, in no obvious distress  Musculoskeletal - Right wrist  - inspection: no atrophy, normal joint alignment, no swelling  - palpation: mild tenderness soft tissue tendons over the radiocarpal joint, no bony or soft tissue tenderness, no tenderness at the anatomical snuffbox  - ROM:  90 deg flexion   70 deg extension   25 deg abduction   65 deg adduction  - strength: 5/5  strength, 5/5 wrist abduction, 5/5 flexion, extension, pronation, supination, adduction  - special tests:  (+) Tinel's right elbow   (+) Finkelstein mild  (+) Phalen and carpal compression at median nerve  (-) Plaza click test  (-) ulnar impaction  Neuro  - no numbness, no motor deficit, grossly normal coordination, normal muscle tone    IMAGING :  XR HAND RT G/E 3 VW   9/23/2021 8:16 AM      HISTORY:  . Problem/pain.                                                                      IMPRESSION: Unremarkable exam.     CHICA CEDENO MD         ASSESSMENT & PLAN  Ms. Keller is a 49 year old year old female with past medical history of tendinitis of right wrist, EMG diagnosed mild ulnar neuropath who presents to clinic today with worsening right wrist pain. Additional paresthesias of hand concerning for new carpal tunnel syndrome    Diagnosis:   Tendinitis of right wrist  Ulnar neuritis of right elbow  Paresthesias right wrist    Additional diagnostic testing reviewed such as updated EMG discussed. Due to high level of discomfort with last emg, we opted to hold off.     Start occupational hand therapy for tendinitis, ulnar neuritis, and presumed carpal tunnel symptoms.  Wear thumb spica wrist brace at night to determine whether paresthesias and tendinitis improve.  Wear thumb based brace with Canvas use as able.  Trial of diclofenac gel.    Of note previous corticosteroid injection at first dorsal compartment caused significant skin atrophy performed in 2016. Will try to avoid repeat  injection superficially.    If no improvement in symptoms will reevaluate need for repeat EMG, consider MRI wrist, and/or discuss diagnostic injection such as carpal tunnel injection.    I support reduced work with Canvas when able and I can support her needs at [place] due to medical necessity.    It was a pleasure seeing Renetta today.    Silver Cortez DO, [facility]  Primary Care Sports Medicine      Again, thank you for allowing me to participate in the care of your patient.      Sincerely,    Silver Cortez DO

## 2024-11-27 ENCOUNTER — OFFICE VISIT (OUTPATIENT)
Dept: DERMATOLOGY | Facility: CLINIC | Age: 49
End: 2024-11-27
Payer: COMMERCIAL

## 2024-11-27 DIAGNOSIS — L98.8 RHYTIDES: Primary | ICD-10-CM

## 2024-11-27 ASSESSMENT — PAIN SCALES - GENERAL: PAINLEVEL_OUTOF10: NO PAIN (0)

## 2024-11-27 NOTE — NURSING NOTE
Dermatology Rooming Note    Renetta Keller's goals for this visit include:   Chief Complaint   Patient presents with    Derm Problem     Renetta is here today for Botox injections. Glabella and forehead.     Kaylee Durand, EMT

## 2024-11-27 NOTE — LETTER
11/27/2024       RE: Renetta Keller  [address]  [place] [address]     Dear Colleague,    Thank you for referring your patient, Renetta Keller, to the [facility] [place] at [facility]. Please see a copy of my visit note below.      Botulinum Injection Procedure Note:  Cosmetic      Dermatology Problem List:  Last skin check 10/25/23, recommend yearly     1. Rhytides and history of migraines  - Current Tx: Tazorac  - Botox 5/31/23, 10/25/23, 4/17/24, 11/25/24  2. Sebaceous hyperplasia, right forehead  - Future considerations: destruction with hyfrecator  3. Dermatofibroma, left medial ankle     PMHx: Has IUD. Migraines (no other neurologic diseases).  FHx: Melanoma (mother)  SHx: Works at the [employer]    Patient has migraines and Botox helps with those. Can alternate sites every other time.      SURGEON (S): Dr. Kaelyn Elliott        ANESTHESIA:   None    PREOPERATIVE DIAGNOSIS:   Rhytides    LOCATION: upper forehead, glabella          OPERATION/PROCEDURE:   Intralesional botulinum toxin injection     Dilution with 0.6 preserved sterile normal saline in a 50 Unit Botox Vial.    Total units of botulinum toxin: 26     POSTOPERATIVE DIAGNOSIS:   SAME     PREPARATION:   Alcohol swab    DESCRIPTION OF OPERATION/PROCEDURE:   The nature and purpose of the procedure, associated risks including but not limited to bruising, headache or discomfort at the site(s), numbness, muscle twitching, brow or eyelid droop, headache, double vision, not enough effect or too much effect, difficulty whistling or drinking from a straw, loss of muscle tone, or infection. Possible consequences and complications, and alternative methods of treatment were explained in detail. The patient declined a personal or family history of neuromuscular disease prior to the procedure.  The patient is not pregnant or breast feeding.    A signed informed operative  consent was obtained.    The patient was taken to the operative suite and properly positioned. The area to be treated was defined and confirmed by the patient and physician. The area for Botox injection was marked.    Cosmetic procedure: Injections were performed. The patient tolerated the procedure well and there were no complications noted.         Clinical Follow-Up:  2-3 weeks by phone if needed and otherwise 3-6 months for repeat botox      Staff Involved:  Scribe/Staff  Scribe Disclosure:   I, Peri Joseph, am serving as a scribe to document services personally performed by Kaelyn Elliott MD based on data collection and the provider's statements to me.     Provider Disclosure:   The documentation recorded by the scribe accurately reflects the services I personally performed and the decisions made by me.    Kaelyn Elliott MD    Department of Dermatology  [facility]: Phone: [phone], Fax:[phone]  [facility]: Phone: [phone], Fax: [phone]       Again, thank you for allowing me to participate in the care of your patient.      Sincerely,    Kaelyn Elliott MD

## 2024-12-10 NOTE — PROGRESS NOTES
OCCUPATIONAL THERAPY EVALUATION  Type of Visit: Evaluation              Subjective        Presenting condition or subjective complaint:    Date of onset: 24    Relevant medical history:     Past Medical History:   Diagnosis Date    Asthma     Cervical high risk HPV (human papillomavirus) test positive 05/15/2014    05/15/14    Depression ages 17-21    estrogen induced from OCP    Depressive disorder ages [age]    History of blood transfusion     Miscarriage 2011    1st trimester    PPROM Elevated Risk Factor 2015    Elevated risk with PPROM via Dr Karlos MONGE with persistent subchorionic hemorrhage.   Consult with patient on Betamethasone in 3rd trimester based on this risk unassociated with  ctx.    Please offer bethamethasone in 3rd trimester. (28 weeks)      Ulcer     UTI (lower urinary tract infection)       Dates & types of surgery:    Past Surgical History:   Procedure Laterality Date    BIOPSY      dermatology    GENITOURINARY SURGERY      postpartum d&c    OPERATIVE HYSTEROSCOPY WITH MORCELLATOR N/A 2015    Procedure: OPERATIVE HYSTEROSCOPY WITH MORCELLATOR;  Surgeon: Reyna Marshall MD;  Location:  OR    [facility] APPENDECTOMY      [facility] STOMACH SURGERY PROCEDURE UNLISTED      Appendectomy        Prior diagnostic imaging/testing results:       Prior therapy history for the same diagnosis, illness or injury:          Pain with work related tasks, increased work load, lots of mousing that is bothersome. Typing is not so bad. Has vertical mouse with rollerball which sometimes helps, but laptop trackpad is also bothersome. Started in the last year or so. History of Dequervain's in previous years, has brace from then, maybe about 7 years ago. . Pain is dorsal aspect of index and thumb, volar aspect of forearm.     Numbness started about a month ago, feels this is in conjunction with increased student volumes/increased worload on computer. Is working more and later into the  night, getting less sleep.  Numbness and tingling is all digits aside from middle finger, sometimes feel stiff and swollen or hot in hand too. Has noticed increasing weakness as well, dropped a pot of hot water yesterday.       Prior Level of Function  Ind with ADL, IADLs, work, and driving    Living Environment  No concerns    Employment:    Full time, teacher at the . Mostly mousing  Hobbies/Interests:      Patient goals for therapy:  improved pain, sensation     Objective   ADDITIONAL HISTORY:  Right hand dominant  Patient reports symptoms of pain, stiffness/loss of motion, weakness/loss of strength, numbness, and tingling   Transportation: drives  Currently working in normal job without restrictions; Put a request for [facility] for assessment for modifications    Functional Outcome Measure:       12/13/2024    12:00 PM   Upper Extremity Functional Index (  [address] [place] [address] Pete)   a.  Any of your usual work, housework or school activities 2-Moderate Difficulty   b.  Your usual hobbies, recreational or sporting activities 3-A Little bit of Difficulty   c.  Lifting a bag of groceries to waist level 4-No Difficulty   d.  Placing an object onto, or removing it from an overhead shelf 4-No Difficulty   e.  Washing your hair or scalp 4-No Difficulty   f.   Pushing up on your hands (e.g., from bathtub or chair) 4-No Difficulty   g.  Preparing food (e.g., peeling, cutting) 2-Moderate Difficulty   h.  Driving 3-A Little bit of Difficulty   I.   Vacuuming, sweeping, or raking 4-No Difficulty   j.   Dressing 4-No Difficulty   k.  Doing up buttons 4-No Difficulty   l.   Using tools or appliances 3-A Little bit of Difficulty   m. Opening doors 3-A Little bit of Difficulty   n.  Cleaning 3-A Little bit of Difficulty   o.  Tying or lacing shoes 4-No Difficulty   p.  Sleeping 3-A Little bit of Difficulty   q.  Laundering clothes. (e.g., washing, ironing, folding) 4-No Difficulty   r.   Opening a jar 2-Moderate Difficulty   s.  Throwing a  ball 3-A Little bit of Difficulty   t.   Carrying a small suitcase with your affected limb  2-Moderate Difficulty   Column Totals: /80 65        PAIN:  Pain Level at Rest: 3/10  Pain Level with Use: 5/10  Pain Location: wrist, hand, thumb, index finger, ring finger, and small finger  Pain Quality: Aching, Burning, Dull, and Tingling  Pain Frequency: daily  Pain is Worst: daytime or nighttime  Pain is Exacerbated By: , overuse, mousing  Pain is Relieved By: heat, rest, and stretch  Pain Progression: Worsened    POSTURE: Normal     EDEMA:   Edema   - none  + mild    ++ moderate    +++ severe       1st DC +   Radial Styloid -       SENSATION: Decreased Median Nerve distribution per pt report and Decreased Ulnar Nerve distribution per pt report     SCREENS:     ROM:   Wrist ROM  Left AROM Right AROM    Extension 77 77+   Flexion WNL WNL, pain free   Radial Deviation (RD)  WNL, pain free   Ulnar Deviation (UD)  WNL, pain free   Supination  WNL, pain free   Pronation  WNL, pain free         Thumb ROM  Left AROM Right AROM    MP Joint  0/51   IP Joint   0/61   Radial Abduction  WNL, pain free   Palmar Abduction  WNL, pain free   Kapandji Opposition Scale (0-10/10)  10+       OBSERVATIONS/APPEARANCE: No overt deformity    SPECIAL TESTS:   CTS Special Tests  Pain Report Left Right   Median Nerve Compression at Pronator  + mild   Carpal Compression Test-Durkan Test (30 sec)  + mild   Haney Test for Lumbrical Incursion (fist x30 sec)  + mild   Tinel's at Carpal Tunnel     Phalen's Sign  +     De Quervain's Special Tests  Pain Report Left Right   Finkelstein's Test  +   Radial Nerve Tinel's (DRSN)     Joint Laxity of Trapezium  -   Crepitus Present  -   Thumb CMC Joint Adduction Stress Test  -   Thumb CMC Joint Extension Stress Test  -   WHAT Test  +     Ulnar Nerve Special Tests  Pain Report Left Right   Tinel's Cubital Tunnel  ++   Tinel's Guyon's Canal     Elbow Flexion Test  -   Ulnar N Subluxation at Elbow  Patient  reports some catching   Froment's Test  -     NEURAL TENSION TESTING: DNT due to nerve irritability, plan to assess in upcoming session    RESISTED TESTING: Resisted Testing (pain report)   Left Right   Elbow Extension     Elbow Flexion     Supination      Pronation     Wrist Flexion  4-/5+   EDC with Elbow at side     EDC with Elbow Ext     Long Finger Test     FDS     APL  4/5+   EPB  4/5+   EPL  5/5   FCR  4-/5+        STRENGTH: DNT this date    PALPATION:   Ulnar Nerve Palpation    Subscapularis    [place]    Cubital Tunnel +   Guyon's Canal      Median Nerve Palpation    Bicep Muscle    Distal Bicep Tendon -   Pronator Teres  -   Carpal Tunnel -     Pain Report:  - none    + mild    ++ moderate    +++ severe       Radial Styloid -   1st DC +   FCR -   Thumb CMC -   PIN Site    Extensor Wad               Assessment & Plan   CLINICAL IMPRESSIONS  Medical Diagnosis: Tendinitis of right wrist (M77.8)  - Primary    Ulnar neuropathy at elbow, 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  The following significant findings have been identified: Impaired activity tolerance, Impaired coordination, Impaired strength, and Pain.  These identified deficits interfere with their ability to perform work tasks, recreational activities, household chores, meal planning and preparation, and community or volunteer activities as compared to previous level of function.     Clinical Decision Making (Complexity):  Assessment of Occupational Performance: 5 or more Performance Deficits  Occupational Performance Limitations: child rearing, communication management, home establishment and management, meal preparation and cleanup, sleep, work, and leisure activities  Clinical Decision Making (Complexity): Low complexity    PLAN OF CARE  Treatment  Interventions:  Modalities:  US, Iontophoresis, Fluidotherapy, Paraffin, and E-Stim  Therapeutic Exercise:  AROM, AAROM, PROM, Tendon Gliding, Isotonics, Isometrics, and Stabilization  Neuromuscular re-education:  Nerve Gliding, Kinesthetic Training, Proprioceptive Training, Posture, Kinesiotaping, Isometrics, and Stabilization  Manual Techniques:  Friction massage, Myofascial release, and Manual edema mobilization  Orthotic Fabrication:  Static  Self Care:  Self Care Tasks, Ergonomic Considerations, Community Transportation, and Work Tasks    Long Term Goals   OT Goal 1  Goal Identifier: IADL  Goal Description: Renetta will be able to complete meal prep tasks, including transporting a pan and  chopping vegetables, with no more than 2/10 discomfort  Rationale: In order to maximize safety and independence with ADL/IADLs  Target Date: 02/21/25      Frequency of Treatment: 1x/wek, tapering as appropriate  Duration of Treatment: 10 weeks     Recommended Referrals to Other Professionals:  NA  Education Assessment:       Risks and benefits of evaluation/treatment have been explained.   Patient/Family/caregiver agrees with Plan of Care.     Evaluation Time:    OT Eval, Low Complexity Minutes (45183): 25       Signing Clinician: EDUARDO GORE

## 2024-12-13 ENCOUNTER — THERAPY VISIT (OUTPATIENT)
Dept: OCCUPATIONAL THERAPY | Facility: CLINIC | Age: 49
End: 2024-12-13
Payer: COMMERCIAL

## 2024-12-13 DIAGNOSIS — G56.21 ULNAR NEUROPATHY OF RIGHT UPPER EXTREMITY: ICD-10-CM

## 2024-12-13 DIAGNOSIS — R20.2 PARESTHESIAS IN RIGHT HAND: ICD-10-CM

## 2024-12-13 DIAGNOSIS — M77.8 TENDINITIS OF RIGHT WRIST: Primary | ICD-10-CM

## 2024-12-13 PROCEDURE — 97535 SELF CARE MNGMENT TRAINING: CPT | Mod: GO | Performed by: SPECIALIST/TECHNOLOGIST

## 2024-12-13 PROCEDURE — 97165 OT EVAL LOW COMPLEX 30 MIN: CPT | Mod: GO | Performed by: SPECIALIST/TECHNOLOGIST

## 2024-12-15 PROBLEM — G56.21 ULNAR NEUROPATHY OF RIGHT UPPER EXTREMITY: Status: ACTIVE | Noted: 2024-12-15

## 2024-12-15 PROBLEM — R20.2 PARESTHESIAS IN RIGHT HAND: Status: ACTIVE | Noted: 2024-12-15

## 2024-12-15 PROBLEM — M77.8 TENDINITIS OF RIGHT WRIST: Status: ACTIVE | Noted: 2024-12-15

## 2024-12-16 ENCOUNTER — TELEPHONE (OUTPATIENT)
Dept: FAMILY MEDICINE | Facility: CLINIC | Age: 49
End: 2024-12-16
Payer: COMMERCIAL

## 2024-12-16 NOTE — TELEPHONE ENCOUNTER
Prior Authorization Retail Medication Request    Medication/Dose:  Disp Refills Start End GEORGE  traMADol (ULTRAM) 50 MG tablet           Diagnosis and ICD code (if different than what is on RX):  F11.90/M24.011/M51.36  New/renewal/insurance change PA/secondary ins. PA:  Previously Tried and Failed:    Rationale:      Insurance        Primary: [employer]  Insurance ID:  [id number]    Secondary (if applicable):  Insurance ID:      Pharmacy Information (if different than what is on RX)  Name:  [id number] [address]  Phone:    Fax:    Clinic Information  Preferred routing pool for dept communication:

## 2024-12-18 NOTE — TELEPHONE ENCOUNTER
Prior Authorization Approval    Authorization Effective Date: 11/18/2024  Authorization Expiration Date: 6/18/2025  Medication: traMADol (ULTRAM) 50 MG tablet  Approved Dose/Quantity:    Reference #:     Insurance Company: [id number] - Phone [phone] Fax [phone]  Expected CoPay:       CoPay Card Available:      Foundation Assistance Needed:    Which Pharmacy is filling the prescription (Not needed for infusion/clinic administered): [id number] - [place], [place] - [address] [place] [address]  Pharmacy Notified:  Yes  Patient Notified:  **Instructed pharmacy to notify patient when script is ready to /ship.**

## 2024-12-18 NOTE — TELEPHONE ENCOUNTER
Central Prior Authorization Team   Phone: [phone]    PA Initiation    Medication: traMADol (ULTRAM) 50 MG tablet  Insurance Company: [employer] - Phone [phone] Fax [phone]  Pharmacy Filling the Rx: [id number] - [place], [place] - [address] [place] [address]  Filling Pharmacy Phone: [phone]  Filling Pharmacy Fax:    Start Date: 12/18/2024

## 2024-12-20 ENCOUNTER — OFFICE VISIT (OUTPATIENT)
Dept: OBGYN | Facility: CLINIC | Age: 49
End: 2024-12-20
Payer: COMMERCIAL

## 2024-12-20 VITALS
BODY MASS INDEX: 21.07 KG/M2 | WEIGHT: 142.7 LBS | OXYGEN SATURATION: 99 % | SYSTOLIC BLOOD PRESSURE: 121 MMHG | DIASTOLIC BLOOD PRESSURE: 81 MMHG | HEART RATE: 62 BPM

## 2024-12-20 DIAGNOSIS — Z30.430 ENCOUNTER FOR INSERTION OF INTRAUTERINE CONTRACEPTIVE DEVICE: ICD-10-CM

## 2024-12-20 DIAGNOSIS — N95.1 PERIMENOPAUSE: Primary | ICD-10-CM

## 2024-12-20 DIAGNOSIS — Z30.432 ENCOUNTER FOR REMOVAL OF INTRAUTERINE CONTRACEPTIVE DEVICE: ICD-10-CM

## 2024-12-20 PROCEDURE — 58301 REMOVE INTRAUTERINE DEVICE: CPT | Performed by: OBSTETRICS & GYNECOLOGY

## 2024-12-20 PROCEDURE — 58300 INSERT INTRAUTERINE DEVICE: CPT | Performed by: OBSTETRICS & GYNECOLOGY

## 2024-12-20 RX ORDER — ESTRADIOL 0.1 MG/D
1 FILM, EXTENDED RELEASE TRANSDERMAL
Qty: 24 PATCH | Refills: 3 | Status: SHIPPED | OUTPATIENT
Start: 2024-12-23

## 2024-12-20 NOTE — PROGRESS NOTES
IUD Insertion:  CONSULT:    Is a pregnancy test required: No.  Was a consent obtained?  Yes    Subjective: Renetta Keller is a 49 year old  presents for IUD and desires Mirena type IUD.    Patient has been given the opportunity to ask questions about all forms of birth control, including all options appropriate for Renetta Keller. Discussed that no method of birth control, except abstinence is 100% effective against pregnancy or sexually transmitted infection.     Renetta Keller understands she may have the IUD removed at any time. IUD should be removed by a health care provider.    The entire insertion procedure was reviewed with the patient, including care after placement.    No LMP recorded. (Menstrual status: IUD). Has current contraception. No allergy to betadine or shellfish. Patient declines STD screening  HCG Qual Urine   Date Value Ref Range Status   2019 Negative NEG^Negative Final     Comment:     This test is for screening purposes.  Results should be interpreted along with   the clinical picture.  Confirmation testing is available if warranted by   ordering [id number], HCG Quantitative Pregnancy.           /81 (BP Location: Left arm, Patient Position: Sitting, Cuff Size: Adult Regular)   Pulse 62   Wt 64.7 kg (142 lb 11.2 oz)   SpO2 99%   BMI 21.07 kg/m      Pelvic Exam:   EG/BUS: normal genital architecture without lesions, erythema or abnormal secretions.   Vagina: moist, pink, rugae with physiologic discharge and secretions  Cervix: parous no lesions and pink, moist, closed, without lesion or CMT  Uterus: midposition, mobile, no pain  Adnexa: within normal limits and no masses, nodularity, tenderness    PROCEDURE NOTE: -- IUD Insertion    Reason for Insertion: contraception and abnormal uterine bleeding    Paracervical block performed.  Under sterile technique, cervix was visualized with speculum and prepped with Betadine solution swab x 3. Tenaculum was placed for stability.  The uterus was gently straightened and sounded to 7.5 cm. IUD prepared for placement, and IUD inserted according to 's instructions without difficulty or significant resitance, and deployed at the fundus. The strings were visualized and trimmed to 3.0 cm from the external os. Tenaculum was removed and hemostasis noted. Speculum removed.  Patient tolerated procedure well.    EBL: minimal    Complications: none    ASSESSMENT:     ICD-10-CM    1. Encounter for insertion of intrauterine contraceptive device  Z30.430 levonorgestrel (MIRENA) 52 MG (20 mcg/day) IUD 1 each     INSERTION INTRAUTERINE DEVICE      2. Encounter for removal of intrauterine contraceptive device  Z30.432 REMOVE INTRAUTERINE DEVICE           PLAN:    Given 's handouts, including when to have IUD removed, list of danger s/sx, side effects and follow up recommended. Encouraged condom use for prevention of STD. Back up contraception advised for 7 days if progestin method. Advised to call for any fever, for prolonged or severe pain or bleeding, abnormal vaginal discharge, or unable to palpate strings. She was advised to use pain medications (ibuprofen) as needed for mild to moderate pain. Advised to follow-up in clinic in 4-6 weeks for IUD string check if unable to find strings or as directed by provider.     Reyna Marshall MD      IUD Removal:  SUBJECTIVE:    Is a pregnancy test required: No.  Was a consent obtained?  Yes    Renetta Kellre is a 49 year old female,, No LMP recorded. (Menstrual status: IUD). who presents today for IUD removal. Her current IUD was placed 5 year ago. She has not had problems with the IUD. She requests removal of the IUD because the IUD effectiveness has     Today's PHQ-2 Score:       2024    12:41 PM   PHQ-2 (  [employer])   Q1: Little interest or pleasure in doing things 0   Q2: Feeling down, depressed or hopeless 0   PHQ-2 Score 0       PROCEDURE:    A speculum exam  was performed and the cervix was visualized. The IUD string was visualized. Using ring forceps, the string  was grasped and the IUD removed intact.    POST PROCEDURE:    The patient tolerated the procedure well. Patient was discharged in stable condition.    Call if bleeding, pain or fever occur. and Birth control counseling given.    Reyna Marshall MD

## 2024-12-20 NOTE — PROGRESS NOTES
Assessment & Plan     Perimenopause  Discussed options for management - hormonal and nonhormonal  Discussed FDA approved indications for MHT  Discussed formulations, risks, benefits.   Reviewed breast cancer and CVD risk  Discussed reduction in breast cancer risk elevation with non-systemic progestin. Kyleena removed and Mirena replaced today.   Will start with 0.1 since still in josefina-menopause.   Discussed risk of thrombo-embolism, vaginal bleeding, other side effects.   RTC 3 months.     - estradiol (VIVELLE-DOT) 0.1 MG/24HR bi-weekly patch; Place 1 patch over 96 hours onto the skin twice a week.    Encounter for insertion of intrauterine contraceptive device    - levonorgestrel (MIRENA) 52 MG (20 mcg/day) IUD 1 each  - INSERTION INTRAUTERINE DEVICE    Encounter for removal of intrauterine contraceptive device    - REMOVE INTRAUTERINE DEVICE                No follow-ups on file.    Chencho Jackson is a 49 year old, presenting for the following health issues:  Contraception (IUD removal and re-insertion)    HPI   Presents for IUD remove/replace  Also would like to discuss perimenopause symptoms  Periods have gotten lighter and lighter, but still regular.  Sleep issues have worsened since last visit.   Has been having some lower back ortho issues    Past Medical History:   Diagnosis Date    Asthma     Cervical high risk HPV (human papillomavirus) test positive 05/15/2014    05/15/14    Depression ages 17-21    estrogen induced from OCP    Depressive disorder ages 17-21    History of blood transfusion     Miscarriage 2011    1st trimester    PPROM Elevated Risk Factor 2015    Elevated risk with PPROM via Dr Karlos MONGE with persistent subchorionic hemorrhage.   Consult with patient on Betamethasone in 3rd trimester based on this risk unassociated with  ctx.    Please offer bethamethasone in 3rd trimester. (28 weeks)      Ulcer     UTI (lower urinary tract infection)        Past Surgical History:  "  Procedure Laterality Date    BIOPSY      dermatology    GENITOURINARY SURGERY      postpartum d&c    OPERATIVE HYSTEROSCOPY WITH MORCELLATOR N/A 11/23/2015    Procedure: OPERATIVE HYSTEROSCOPY WITH MORCELLATOR;  Surgeon: Reyna Marshall MD;  Location:  OR    [facility] APPENDECTOMY  1991    [facility] STOMACH SURGERY PROCEDURE UNLISTED  1990    Appendectomy       Family History   Problem Relation Age of Onset    Hypertension Mother     Other Cancer Mother         Melanoma at age 27    Cancer Mother     Macular Degeneration Father         injury related \"considered blind in one eye\"    Family History Negative Father     Breast Cancer Maternal Grandmother         Cancer occurred in her late 80s    Coronary Artery Disease Maternal Grandfather     Hypertension Maternal Grandfather     Family History Negative Other     Glaucoma No family hx of        Social History     Socioeconomic History    Marital status: Single     Spouse name: Not on file    Number of children: 0    Years of education: Not on file    Highest education level: Not on file   Occupational History    Not on file   Tobacco Use    Smoking status: Never     Passive exposure: Never    Smokeless tobacco: Never   Substance and Sexual Activity    Alcohol use: Yes     Comment: 1-4 glasses of wine per week    Drug use: Never    Sexual activity: Yes     Partners: Male     Birth control/protection: I.U.D.   Other Topics Concern    Parent/sibling w/ CABG, MI or angioplasty before 65F 55M? No   Social History Narrative        Works as a .        Social Documentation:        Balanced Diet: YES    Calcium intake: 2-3 per day    Caffeine: 2 per day    Exercise:  type of activity cardio;  3-4 times per week    Sunscreen: Yes    Seatbelts:  Yes    Self Breast Exam:  Yes    Self Testicular Exam: na    Physical/Emotional/Sexual Abuse: No    Do you feel safe in your environment? Yes        Cholesterol screen up to date: fam hx, had it checked 2 years ago    Eye " Exam up to date: Yes    Dental Exam up to date: Yes    Pap smear up to date: Yes, do today.  Last was 06/08, nml    Mammogram up to date: Does Not Apply    Dexa Scan up to date: Does Not Apply    Colonoscopy up to date: Does Not Apply    Immunizations up to date: Yes    Glucose screen if over 40:  na                 Social Drivers of Health     Financial Resource Strain: Low Risk  (9/20/2024)    Financial Resource Strain     Within the past 12 months, have you or your family members you live with been unable to get utilities (heat, electricity) when it was really needed?: No   Food Insecurity: Low Risk  (9/20/2024)    Food Insecurity     Within the past 12 months, did you worry that your food would run out before you got money to buy more?: No     Within the past 12 months, did the food you bought just not last and you didn t have money to get more?: No   Transportation Needs: Low Risk  (9/20/2024)    Transportation Needs     Within the past 12 months, has lack of transportation kept you from medical appointments, getting your medicines, non-medical meetings or appointments, work, or from getting things that you need?: No   Physical Activity: Unknown (9/20/2024)    Exercise Vital Sign     Days of Exercise per Week: 6 days     Minutes of Exercise per Session: Not on file   Stress: Stress Concern Present (9/20/2024)    [place] [employer] - Occupational Stress Questionnaire     Feeling of Stress : To some extent   Social Connections: Unknown (9/20/2024)    Social Connection and Isolation Panel [NHANES]     Frequency of Communication with Friends and Family: Not on file     Frequency of Social Gatherings with Friends and Family: Twice a week     Attends Latter-day Services: Not on file     Active Member of Clubs or Organizations: Not on file     Attends Club or Organization Meetings: Not on file     Marital Status: Not on file   Interpersonal Safety: Low Risk  (9/20/2024)    Interpersonal Safety      Do you feel physically and emotionally safe where you currently live?: Yes     Within the past 12 months, have you been hit, slapped, kicked or otherwise physically hurt by someone?: No     Within the past 12 months, have you been humiliated or emotionally abused in other ways by your partner or ex-partner?: No   Housing Stability: Low Risk  (9/20/2024)    Housing Stability     Do you have housing? : Yes     Are you worried about losing your housing?: No       Current Outpatient Medications   Medication Sig Dispense Refill    [facility] BISMUTH 262 MG chewable tablet       fluticasone (FLONASE) 50 MCG/ACT nasal spray Spray 1 spray into both nostrils daily 9.9 mL 3    SUMAtriptan (IMITREX) 50 MG tablet Take 1 tablet (50 mg) by mouth at onset of headache for migraine May repeat in 2 hours. Max 4 tablets/24 hours. 16 tablet 5    tazarotene (TAZORAC) 0.05 % external cream Externally apply topically at bedtime. Apply to chest and upper back. Stop using if the skin becomes too dry. 60 g 3    traMADol (ULTRAM) 50 MG tablet Take 1-1.5 tablets (50-75 mg) by mouth nightly as needed (for wrist and shoulder pain). 55 tablet 0    traMADol (ULTRAM) 50 MG tablet Take 1-1.5 tablets (50-75 mg) by mouth daily. 45 tablet 3     Current Facility-Administered Medications   Medication Dose Route Frequency Provider Last Rate Last Admin    Botulinum Toxin Type A (Cosm) (BOTOX-COSMETIC) 50 units injection 24 Units  24 Units Intramuscular Once Kaelyn Elliott MD        levonorgestrel (KYLEENA) 19.5 MG IUD 19.5 mg  1 each Intrauterine Once Reyna Marshall MD              Allergies   Allergen Reactions    Ragweeds            Review of Systems  Constitutional, HEENT, cardiovascular, pulmonary, gi and gu systems are negative, except as otherwise noted.      Objective    /81 (BP Location: Left arm, Patient Position: Sitting, Cuff Size: Adult Regular)   Pulse 62   Wt 64.7 kg (142 lb 11.2 oz)   SpO2 99%   BMI 21.07 kg/m    Body mass index  is 21.07 kg/m .  Physical Exam   GENERAL: alert and no distress  ABDOMEN: soft, nontender, no hepatosplenomegaly, no masses and bowel sounds normal   (female) w/bimanual: normal female external genitalia, normal urethral meatus, normal vaginal mucosa, normal cervix/adnexa/uterus without masses or discharge  MS: no gross musculoskeletal defects noted, no edema  PSYCH: mentation appears normal, affect normal/bright            Signed Electronically by: Reyna Marshall MD

## 2025-01-17 ENCOUNTER — TELEPHONE (OUTPATIENT)
Dept: GASTROENTEROLOGY | Facility: CLINIC | Age: 50
End: 2025-01-17
Payer: COMMERCIAL

## 2025-01-17 DIAGNOSIS — Z12.11 ENCOUNTER FOR SCREENING COLONOSCOPY: Primary | ICD-10-CM

## 2025-01-17 RX ORDER — BISACODYL 5 MG/1
TABLET, DELAYED RELEASE ORAL
Qty: 4 TABLET | Refills: 0 | Status: SHIPPED | OUTPATIENT
Start: 2025-01-17

## 2025-01-17 NOTE — TELEPHONE ENCOUNTER
Attempted to contact patient in order to complete pre assessment questions.     Pt answered, but was not able to talk right now.  Pt will call back.     Callback communication sent via "[employer].    Sho Conner RN

## 2025-01-17 NOTE — TELEPHONE ENCOUNTER
"Pre visit planning completed.      Procedure details:    Patient scheduled for Colonoscopy on 1/30/25.     Arrival time: 0630. Procedure time 0730    Facility location: [facility]; [address] [place] [address] [place] [address]. Check in location: [facility].     Sedation type: MAC - Noted \"Chronic, continuous use of opioids\" per problem list  and per scheduling note: \"Are you taking any prescription medications for pain 3 or more times per week? YES, Schedule with MAC\"  Tramadol scripts noted per med list.    Pre op exam needed? Yes. Patient needs to complete an in person pre-operative exam within 30 days of procedure. Informed patient pre op is needed via [employer].    Indication for procedure: Screening      Chart review:     Electronic implanted devices? No    Recent diagnosis of diverticulitis within the last 6 weeks? No      Medication review:    Diabetic? No    Anticoagulants? No    Weight loss medication/injectable? No GLP-1 medication per patient's medication list. Nursing to verify with pre-assessment call.    Other medication HOLDING recommendations:  N/A      Prep for procedure:     Bowel prep recommendation: Extended Golytely. Bowel prep sent to [facility]/PHARMACY #[id number] [address] [facility].  Due to: chronic pain medication noted in chart - Noted \"Chronic, continuous use of opioids\" per problem list  and per scheduling note: \"Are you taking any prescription medications for pain 3 or more times per week? YES, Schedule with MAC\"  Tramadol scripts noted per med list.    Procedure information and instructions sent via [employer]         Adriana Garcia RN  Endoscopy Procedure Pre Assessment   [phone] option 2  "

## 2025-01-27 ENCOUNTER — TELEPHONE (OUTPATIENT)
Dept: GASTROENTEROLOGY | Facility: CLINIC | Age: 50
End: 2025-01-27
Payer: COMMERCIAL

## 2025-01-27 NOTE — TELEPHONE ENCOUNTER
Caller: Renetta    Reason for Reschedule/Cancellation (please be detailed, any staff messages or encounters to note?):   Schedule conflict with work    Did you cancel or rescheduled an EUS procedure? No.    Is screening questionnaire older than 3 months from the reschedule date.   If Yes, please complete screening questionnaire. No    Prior to reschedule please review:  Ordering Provider: Kendall Garza  Sedation Determined: MAC  Does patient have any ASC Exclusions, please identify?: N    Notes on Cancelled Procedure:  Procedure: Lower Endoscopy [Colonoscopy]   Date: 1/30/25  Location: [facility]; [address] [place] [address]   Surgeon: Yanick    Rescheduled: Yes,   Procedure: Lower Endoscopy [Colonoscopy]    Date: 2/3/25   Location: [facility]; [address] [place] [address]    Surgeon: Yanick   Sedation Level Scheduled  MAC ,  Reason for Sedation Level Review   Instructions updated and sent: Augustin     Does patient need PAC or Pre -Op Rescheduled? : No

## 2025-01-30 NOTE — TELEPHONE ENCOUNTER
"Caller: Renetta    Reason for Reschedule/Cancellation (please be detailed, any staff messages or encounters to note?):   Unable to get in for pre-op needed at     Did you cancel or rescheduled an EUS procedure? No.    Is screening questionnaire older than 3 months from the reschedule date.   If Yes, please complete screening questionnaire. Yes    Prior to reschedule please review:  Ordering Provider: Kendall Garza   Sedation Determined: MAC  Does patient have any ASC Exclusions, please identify?: no    Notes on Cancelled Procedure:  Procedure: Lower Endoscopy [Colonoscopy]   Date: 2/3  Location: [facility]; [facility] [address] [place] [address]   Surgeon: Yanick    Rescheduled: Yes,       Endoscopy Scheduling Screen    Have you had any respiratory illness or flu-like symptoms in the last 10 days?  No    What is your communication preference for Instructions and/or Bowel Prep?   MyChart    What insurance is in the chart?  Other:  [employer]    Ordering/Referring Provider: Kendall Garza    (If ordering provider performs procedure, schedule with ordering provider unless otherwise instructed. )    BMI: Estimated body mass index is 21.07 kg/m  as calculated from the following:    Height as of 9/20/24: 1.753 m (5' 9\").    Weight as of 12/20/24: 64.7 kg (142 lb 11.2 oz).     Sedation Ordered  MAC/deep sedation.   BMI<= 45 45 < BMI <= 48 48 < BMI < = 50  BMI > 50   No Restrictions No [facility]  No [facility]  [facility] with exceptions Hospital Only OR Only       Do you have a history of malignant hyperthermia?  No    (Females) Are you currently pregnant?   No     Have you been diagnosed or told you have pulmonary hypertension?   No    Do you have an LVAD?  No    Have you been told you have moderate to severe sleep apnea?  No.    Have you been told you have COPD, asthma, or any other lung disease?  No    Do you have any heart conditions?  No     Have you ever had or are you waiting for an organ transplant?  No. " "Continue scheduling, no site restrictions.    Have you had a stroke or transient ischemic attack (TIA aka \"mini stroke\" in the last 6 months?   No    Have you been diagnosed with or been told you have cirrhosis of the liver?   No.    Are you currently on dialysis?   No    Do you need assistance transferring?   No    BMI: Estimated body mass index is 21.07 kg/m  as calculated from the following:    Height as of 9/20/24: 1.753 m (5' 9\").    Weight as of 12/20/24: 64.7 kg (142 lb 11.2 oz).     Is patients BMI > 40 and scheduling location UP?  No    Do you take an injectable or oral medication for weight loss or diabetes (excluding insulin)?  No    Do you take the medication Naltrexone?  No    Do you take blood thinners?  No       Prep   Are you currently on dialysis or do you have chronic kidney disease?  No    Do you have a diagnosis of diabetes?  No    Do you have a diagnosis of cystic fibrosis (CF)?  No    On a regular basis do you go 3 -5 days between bowel movements?  No    BMI > 40?  No    Preferred Pharmacy:    [id number] [address] [address]  Phone: [phone] Fax: [phone]      Final Scheduling Details     Procedure scheduled  Colonoscopy / Upper endoscopy (EGD)    Surgeon:  Amie     Date of procedure:  4/11     Pre-OP / PAC:   No - Not required for this site.    Location  [facility] - ASC - Patient preference.    Sedation   MAC/Deep Sedation - Per exclusion criteria.      Patient Reminders:   You will receive a call from a Nurse to review instructions and health history.  This assessment must be completed prior to your procedure.  Failure to complete the Nurse assessment may result in the procedure being cancelled.      On the day of your procedure, please designate an adult(s) who can drive you home stay with you for the next 24 hours. The medicines used in the exam will make you sleepy. You will not be able to drive.      You " cannot take public transportation, ride share services, or non-medical taxi service without a responsible caregiver.  Medical transport services are allowed with the requirement that a responsible caregiver will receive you at your destination.  We require that drivers and caregivers are confirmed prior to your procedure.

## 2025-03-21 ENCOUNTER — MYC REFILL (OUTPATIENT)
Dept: OBGYN | Facility: CLINIC | Age: 50
End: 2025-03-21
Payer: COMMERCIAL

## 2025-03-21 DIAGNOSIS — N95.1 PERIMENOPAUSE: ICD-10-CM

## 2025-03-25 ENCOUNTER — TELEPHONE (OUTPATIENT)
Dept: GASTROENTEROLOGY | Facility: CLINIC | Age: 50
End: 2025-03-25
Payer: COMMERCIAL

## 2025-03-25 NOTE — TELEPHONE ENCOUNTER
Bowel Prep Review:  Disclaimer: No call was made to the patient.     Extended Golytely bowel prep. Bowel prep sent to [facility]/PHARMACY #[id number] - [place], [place] - [address] [place] [address]. Will wait to send prescription until talking to the patient. She may still have prescription from January.  Recommended due to chronic pain medication noted in chart.   Instructions were sent via [employer].       Kirsten Lauren LPN  Endoscopy Procedure Pre Assessment

## 2025-03-26 RX ORDER — ESTRADIOL 0.1 MG/D
1 FILM, EXTENDED RELEASE TRANSDERMAL
Qty: 24 PATCH | Refills: 3 | OUTPATIENT
Start: 2025-03-27

## 2025-03-27 ENCOUNTER — TELEPHONE (OUTPATIENT)
Dept: GASTROENTEROLOGY | Facility: CLINIC | Age: 50
End: 2025-03-27
Payer: COMMERCIAL

## 2025-03-27 NOTE — TELEPHONE ENCOUNTER
Rescheduled Colonoscopy  Due to: did not complete pre op.        Pre visit planning completed.      Procedure details:    Patient scheduled for Colonoscopy on 4/11/25.     Arrival time: 0845. Procedure time 0945    Facility location: [facility]; [address] [place] [address], 5[address] Floor, [place] [address]. Check in location: 5th Floor.    Sedation type: MAC    Pre op exam needed? No.    Indication for procedure: screening       Chart review:     Electronic implanted devices? No    Recent diagnosis of diverticulitis within the last 6 weeks? No      Medication review:    Diabetic? No    Anticoagulants? No    Weight loss medication/injectable? No GLP-1 medication per patient's medication list. Nursing to verify with pre-assessment call.    Other medication HOLDING recommendations:  N/A      Prep for procedure:     Bowel prep recommendation: Extended Golytely. Bowel prep sent to [facility]/PHARMACY #[id number] [address]. (Still active script from 1/17/25)    Due to: chronic pain medication noted in chart    Procedure information and instructions sent via [employer]         Trang Pina RN  Endoscopy Procedure Pre Assessment   [phone] option 3

## 2025-03-27 NOTE — TELEPHONE ENCOUNTER
Attempted to contact patient in order to complete pre assessment questions.     Called the patient but she was unable to do her pre assessment at this time. Left a message to return call to [phone] option 3.    Callback communication sent via [employer].      Kirsten Lauren LPN  Endoscopy Procedure Pre Assessment

## 2025-04-01 NOTE — TELEPHONE ENCOUNTER
Second call attempt to complete pre assessment.     No answer. Left message to return call to [phone] #3 by next business day prior to 4PM.    Callback communication sent via [employer].      Kirsten Lauren LPN  Endoscopy Procedure Pre Assessment

## 2025-04-03 ENCOUNTER — MYC REFILL (OUTPATIENT)
Dept: FAMILY MEDICINE | Facility: CLINIC | Age: 50
End: 2025-04-03
Payer: COMMERCIAL

## 2025-04-03 DIAGNOSIS — M51.369 DDD (DEGENERATIVE DISC DISEASE), LUMBAR: ICD-10-CM

## 2025-04-03 DIAGNOSIS — M24.011 LOOSE BODY IN SHOULDER JOINT, RIGHT: ICD-10-CM

## 2025-04-03 DIAGNOSIS — F11.90 CHRONIC, CONTINUOUS USE OF OPIOIDS: ICD-10-CM

## 2025-04-06 RX ORDER — TRAMADOL HYDROCHLORIDE 50 MG/1
50-75 TABLET ORAL DAILY
Qty: 45 TABLET | Refills: 3 | Status: SHIPPED | OUTPATIENT
Start: 2025-04-06

## 2025-04-07 NOTE — TELEPHONE ENCOUNTER
Pre assessment completed for upcoming procedure.   (Please see previous telephone encounter notes for complete details)    Patient returned call.     Procedure details:    Arrival time and facility location reviewed.    Pre op exam needed? No.    Designated  policy reviewed. Instructed to have someone stay 24  hours post procedure.     Medication review:    Medications reviewed. Please see supporting documentation below. Holding recommendations discussed (if applicable).     Prep for procedure:     Procedure prep instructions reviewed.      Any additional information needed:  N/A    Patient verbalized understanding and had no questions or concerns at this time.      Kirsten Lauren LPN  Endoscopy Procedure Pre Assessment   [phone] option 3

## 2025-04-10 NOTE — H&P
Renetta Keller  [id number]  female  49 year old    Reason for procedure/surgery: 49F with history of migraine, chronic opioid use, who presents for screening colonoscopy.    Patient Active Problem List   Diagnosis    CARDIOVASCULAR SCREENING; LDL GOAL LESS THAN 160    Post-nasal drip    Screening for cervical cancer    Migraine without aura and without status migrainosus, not intractable    Human papilloma virus (HPV) infection    Chronic, continuous use of opioids    Cervical high risk HPV (human papillomavirus) test positive    Seasonal allergic rhinitis due to pollen    Tendinitis of right wrist    Ulnar neuropathy of right upper extremity    Paresthesias in right hand       Past Surgical History:    Past Surgical History:   Procedure Laterality Date    BIOPSY      dermatology    GENITOURINARY SURGERY      postpartum d&c    OPERATIVE HYSTEROSCOPY WITH MORCELLATOR N/A 2015    Procedure: OPERATIVE HYSTEROSCOPY WITH MORCELLATOR;  Surgeon: Reyna Marshall MD;  Location:  OR    [facility] APPENDECTOMY      [facility] STOMACH SURGERY PROCEDURE UNLISTED      Appendectomy       Past Medical History:   Past Medical History:   Diagnosis Date    Asthma     Cervical high risk HPV (human papillomavirus) test positive 05/15/2014    05/15/14    Depression ages 17-21    estrogen induced from OCP    Depressive disorder ages [age]    History of blood transfusion     Miscarriage 2011    1st trimester    PPROM Elevated Risk Factor 2015    Elevated risk with PPROM via Dr Karlos MONGE with persistent subchorionic hemorrhage.   Consult with patient on Betamethasone in 3rd trimester based on this risk unassociated with  ctx.    Please offer bethamethasone in 3rd trimester. (28 weeks)      Ulcer     UTI (lower urinary tract infection)        Social History:   Social History     Tobacco Use    Smoking status: Never     Passive exposure: Never    Smokeless tobacco: Never   Substance Use Topics    Alcohol use: Yes  "    Comment: 1-4 glasses of wine per week       Family History:   Family History   Problem Relation Age of Onset    Hypertension Mother     Other Cancer Mother         Melanoma at age 27    Cancer Mother     Macular Degeneration Father         injury related \"considered blind in one eye\"    Family History Negative Father     Breast Cancer Maternal Grandmother         Cancer occurred in her late 80s    Coronary Artery Disease Maternal Grandfather     Hypertension Maternal Grandfather     Family History Negative Other     Glaucoma No family hx of        Allergies:   Allergies   Allergen Reactions    Ragweeds        Active Medications:   Current Outpatient Medications   Medication Sig Dispense Refill    bisacodyl (DULCOLAX) 5 MG EC tablet Two days prior to exam take two (2) tablets at 4pm. One day prior to exam take two (2) tablets at 4pm 4 tablet 0    [employer] BISMUTH 262 MG chewable tablet       estradiol (VIVELLE-DOT) 0.1 MG/24HR bi-weekly patch Place 1 patch over 96 hours onto the skin twice a week. 24 patch 3    fluticasone (FLONASE) 50 MCG/ACT nasal spray Spray 1 spray into both nostrils daily 9.9 mL 3    levonorgestrel (MIRENA) 52 MG (20 mcg/day) IUD 1 each by Intrauterine route once.      polyethylene glycol (GOLYTELY) 236 g suspension Two days before procedure at 5PM fill first container with water. Mix and drink an 8 oz glass every 15 minutes until HALF of the container is gone. Place the remainder in the refrigerator. One day before procedure at 5PM drink second half of bowel prep. Drink an 8 oz glass every 15 minutes until it is gone. Day of procedure 6 hours before arrival time fill the 2nd container with water. Mix and drink an 8 oz glass every 15 minutes until HALF of the container is gone. Discard the remaining solution. 8000 mL 0    SUMAtriptan (IMITREX) 50 MG tablet Take 1 tablet (50 mg) by mouth at onset of headache for migraine May repeat in 2 hours. Max 4 tablets/24 hours. 16 tablet 5    tazarotene " (TAZORAC) 0.05 % external cream Externally apply topically at bedtime. Apply to chest and upper back. Stop using if the skin becomes too dry. 60 g 3    traMADol (ULTRAM) 50 MG tablet Take 1-1.5 tablets (50-75 mg) by mouth daily. 45 tablet 3    traMADol (ULTRAM) 50 MG tablet Take 1-1.5 tablets (50-75 mg) by mouth nightly as needed (for wrist and shoulder pain). 55 tablet 0       Systemic Review:   CONSTITUTIONAL: NEGATIVE for fever, chills, change in weight  ENT/MOUTH: NEGATIVE for ear, mouth and throat problems  RESP: NEGATIVE for significant cough or SOB  CV: NEGATIVE for chest pain, palpitations or peripheral edema    Physical Examination:   Vital Signs: There were no vitals taken for this visit.  GENERAL: healthy, alert and no distress  NECK: no adenopathy, no asymmetry, masses, or scars  RESP: lungs clear to auscultation - no rales, rhonchi or wheezes  CV: regular rate and rhythm, normal S1 S2, no S3 or S4, no murmur, click or rub, no peripheral edema and peripheral pulses strong  ABDOMEN: soft, nontender, no hepatosplenomegaly, no masses and bowel sounds normal  MS: no gross musculoskeletal defects noted, no edema    I examined patient s dentition and informed the patient that dental injuries are a risk of any anesthetic management. No concerns were noted with this patient's dentition. . The patient has consented to proceed with the procedure.    Plan: Appropriate to proceed as scheduled.      Shell Holloway MD  4/11/2025    PCP:  Kendall Garza

## 2025-04-11 ENCOUNTER — HOSPITAL ENCOUNTER (OUTPATIENT)
Facility: AMBULATORY SURGERY CENTER | Age: 50
Discharge: HOME | End: 2025-04-11
Attending: STUDENT IN AN ORGANIZED HEALTH CARE EDUCATION/TRAINING PROGRAM
Payer: COMMERCIAL

## 2025-04-11 VITALS
WEIGHT: 142 LBS | RESPIRATION RATE: 16 BRPM | TEMPERATURE: 97.1 F | HEIGHT: 69 IN | HEART RATE: 48 BPM | SYSTOLIC BLOOD PRESSURE: 119 MMHG | DIASTOLIC BLOOD PRESSURE: 70 MMHG | OXYGEN SATURATION: 100 % | BODY MASS INDEX: 21.03 KG/M2

## 2025-04-11 LAB
COLONOSCOPY: NORMAL
HCG UR QL: NEGATIVE
INTERNAL QC OK POCT: NORMAL
POCT KIT EXPIRATION DATE: NORMAL
POCT KIT LOT NUMBER: NORMAL

## 2025-04-11 PROCEDURE — 81025 URINE PREGNANCY TEST: CPT | Performed by: PATHOLOGY

## 2025-04-11 PROCEDURE — 45378 DIAGNOSTIC COLONOSCOPY: CPT | Mod: 33 | Performed by: STUDENT IN AN ORGANIZED HEALTH CARE EDUCATION/TRAINING PROGRAM

## 2025-04-11 RX ORDER — PROCHLORPERAZINE MALEATE 10 MG
10 TABLET ORAL EVERY 6 HOURS PRN
Status: DISCONTINUED | OUTPATIENT
Start: 2025-04-11 | End: 2025-04-12 | Stop reason: HOSPADM

## 2025-04-11 RX ORDER — ONDANSETRON 2 MG/ML
4 INJECTION INTRAMUSCULAR; INTRAVENOUS
Status: DISCONTINUED | OUTPATIENT
Start: 2025-04-11 | End: 2025-04-11 | Stop reason: HOSPADM

## 2025-04-11 RX ORDER — LIDOCAINE 40 MG/G
CREAM TOPICAL
Status: DISCONTINUED | OUTPATIENT
Start: 2025-04-11 | End: 2025-04-11 | Stop reason: HOSPADM

## 2025-04-11 RX ORDER — SODIUM CHLORIDE, SODIUM LACTATE, POTASSIUM CHLORIDE, CALCIUM CHLORIDE 600; 310; 30; 20 MG/100ML; MG/100ML; MG/100ML; MG/100ML
INJECTION, SOLUTION INTRAVENOUS CONTINUOUS
Status: DISCONTINUED | OUTPATIENT
Start: 2025-04-11 | End: 2025-04-11 | Stop reason: HOSPADM

## 2025-04-11 RX ORDER — NALOXONE HYDROCHLORIDE 0.4 MG/ML
0.4 INJECTION, SOLUTION INTRAMUSCULAR; INTRAVENOUS; SUBCUTANEOUS
Status: DISCONTINUED | OUTPATIENT
Start: 2025-04-11 | End: 2025-04-12 | Stop reason: HOSPADM

## 2025-04-11 RX ORDER — NALOXONE HYDROCHLORIDE 0.4 MG/ML
0.2 INJECTION, SOLUTION INTRAMUSCULAR; INTRAVENOUS; SUBCUTANEOUS
Status: DISCONTINUED | OUTPATIENT
Start: 2025-04-11 | End: 2025-04-12 | Stop reason: HOSPADM

## 2025-04-11 RX ORDER — ONDANSETRON 4 MG/1
4 TABLET, ORALLY DISINTEGRATING ORAL EVERY 6 HOURS PRN
Status: DISCONTINUED | OUTPATIENT
Start: 2025-04-11 | End: 2025-04-12 | Stop reason: HOSPADM

## 2025-04-11 RX ORDER — FLUMAZENIL 0.1 MG/ML
0.2 INJECTION, SOLUTION INTRAVENOUS
Status: ACTIVE | OUTPATIENT
Start: 2025-04-11 | End: 2025-04-11

## 2025-04-11 RX ORDER — ONDANSETRON 2 MG/ML
4 INJECTION INTRAMUSCULAR; INTRAVENOUS EVERY 6 HOURS PRN
Status: DISCONTINUED | OUTPATIENT
Start: 2025-04-11 | End: 2025-04-12 | Stop reason: HOSPADM

## 2025-04-14 ENCOUNTER — TELEPHONE (OUTPATIENT)
Dept: DERMATOLOGY | Facility: CLINIC | Age: 50
End: 2025-04-14

## 2025-04-14 NOTE — TELEPHONE ENCOUNTER
4/17 Left Voicemail (2nd Attempt) and sent letter for the patient to call back and schedule the following:    Appointment type: Cosmetic Return   Provider: Kev  Return date: Next available (December 2025)  Specialty phone number: [phone]  Additional appointment(s) needed: na  Additonal Notes: Was a Dr. Elliott pt, Lexi no longer works at [employer] [employer]      4/14 Left Voicemail (1st Attempt) and Sent Mychart (1st Attempt) for the patient to call back and schedule the following:    Appointment type: Cosmetic Return   Provider: Kev  Return date: Next available (December 2025)  Specialty phone number: [phone]  Additional appointment(s) needed: na  Additonal Notes: Was a Dr. Elliott pt, Lexi no longer works at [facility] [employer]

## 2025-05-01 ENCOUNTER — MYC MEDICAL ADVICE (OUTPATIENT)
Dept: FAMILY MEDICINE | Facility: CLINIC | Age: 50
End: 2025-05-01
Payer: COMMERCIAL

## 2025-06-26 ENCOUNTER — VIRTUAL VISIT (OUTPATIENT)
Dept: FAMILY MEDICINE | Facility: CLINIC | Age: 50
End: 2025-06-26
Payer: COMMERCIAL

## 2025-06-26 DIAGNOSIS — M51.362 DEGENERATION OF INTERVERTEBRAL DISC OF LUMBAR REGION WITH DISCOGENIC BACK PAIN AND LOWER EXTREMITY PAIN: Primary | ICD-10-CM

## 2025-06-26 PROCEDURE — 1125F AMNT PAIN NOTED PAIN PRSNT: CPT | Mod: 95 | Performed by: FAMILY MEDICINE

## 2025-06-26 PROCEDURE — 98006 SYNCH AUDIO-VIDEO EST MOD 30: CPT | Performed by: FAMILY MEDICINE

## 2025-06-26 RX ORDER — TRAMADOL HYDROCHLORIDE 50 MG/1
75 TABLET ORAL DAILY PRN
Qty: 90 TABLET | Refills: 0 | Status: SHIPPED | OUTPATIENT
Start: 2025-06-28

## 2025-06-26 RX ORDER — CELECOXIB 50 MG/1
50 CAPSULE ORAL DAILY PRN
Qty: 60 CAPSULE | Refills: 0 | Status: SHIPPED | OUTPATIENT
Start: 2025-06-26

## 2025-06-26 ASSESSMENT — ANXIETY QUESTIONNAIRES
3. WORRYING TOO MUCH ABOUT DIFFERENT THINGS: NOT AT ALL
GAD7 TOTAL SCORE: 2
IF YOU CHECKED OFF ANY PROBLEMS ON THIS QUESTIONNAIRE, HOW DIFFICULT HAVE THESE PROBLEMS MADE IT FOR YOU TO DO YOUR WORK, TAKE CARE OF THINGS AT HOME, OR GET ALONG WITH OTHER PEOPLE: SOMEWHAT DIFFICULT
1. FEELING NERVOUS, ANXIOUS, OR ON EDGE: SEVERAL DAYS
6. BECOMING EASILY ANNOYED OR IRRITABLE: NOT AT ALL
8. IF YOU CHECKED OFF ANY PROBLEMS, HOW DIFFICULT HAVE THESE MADE IT FOR YOU TO DO YOUR WORK, TAKE CARE OF THINGS AT HOME, OR GET ALONG WITH OTHER PEOPLE?: SOMEWHAT DIFFICULT
2. NOT BEING ABLE TO STOP OR CONTROL WORRYING: NOT AT ALL
7. FEELING AFRAID AS IF SOMETHING AWFUL MIGHT HAPPEN: NOT AT ALL
GAD7 TOTAL SCORE: 2
5. BEING SO RESTLESS THAT IT IS HARD TO SIT STILL: NOT AT ALL
7. FEELING AFRAID AS IF SOMETHING AWFUL MIGHT HAPPEN: NOT AT ALL
GAD7 TOTAL SCORE: 2
4. TROUBLE RELAXING: SEVERAL DAYS

## 2025-06-26 ASSESSMENT — PATIENT HEALTH QUESTIONNAIRE - PHQ9
SUM OF ALL RESPONSES TO PHQ QUESTIONS 1-9: 2
10. IF YOU CHECKED OFF ANY PROBLEMS, HOW DIFFICULT HAVE THESE PROBLEMS MADE IT FOR YOU TO DO YOUR WORK, TAKE CARE OF THINGS AT HOME, OR GET ALONG WITH OTHER PEOPLE: NOT DIFFICULT AT ALL
SUM OF ALL RESPONSES TO PHQ QUESTIONS 1-9: 2

## 2025-06-26 NOTE — PROGRESS NOTES
"Renetta is a 50 year old who is being evaluated via a billable video visit.    How would you like to obtain your AVS? MyChart  If the video visit is dropped, the invitation should be resent by: Send to e-mail at: [email or web address]  Will anyone else be joining your video visit? No    {PROVIDER CHARTING PREFERENCE:450589}    Subjective   Renetta is a 50 year old, presenting for the following health issues:  Pain        6/26/2025     9:58 AM   Additional Questions   Roomed by El BOLANOS     History of Present Illness       Reason for visit:  To discuss treatment plan for nerve pain in hip related to disc disease diagnosis, and to check on medications.    She eats 4 or more servings of fruits and vegetables daily.She consumes 1 sweetened beverage(s) daily.She exercises with enough effort to increase her heart rate 20 to 29 minutes per day.  She exercises with enough effort to increase her heart rate 5 days per week.   She is taking medications regularly.      Estragen patch is working well. Sleep improved since starting it.     Noticed an increase in pain in hip that radiates to the leg and foot. Initially attributed to a pulled muscle but she is noticing tingling sensation. Pain is present despite the presence of ibuprofen and tramadol.       {SUPERLIST (Optional):309207}  {additonal problems for provider to add (Optional):545199}  07/01-   07/20-08/14      {ROS Picklists (Optional):677730}      Objective    Vitals - Patient Reported  Pain Score: Mild Pain (2)  Pain Loc: Hip      Vitals:  No vitals were obtained today due to virtual visit.    Physical Exam   {video visit exam brief selected:341581}    {Diagnostic Test Results (Optional):212269}      Video-Visit Details    Type of service:  Video Visit   Originating Location (pt. Location): {video visit patient location:995495::\"Home\"}  {PROVIDER LOCATION On-site should be selected for visits conducted from your clinic location or adjoining [facility] " "office, or other nearby [facility] building. Off-site should be selected for all other provider locations, including home:211799}  Distant Location (provider location):  {virtual location provider:116897}  Platform used for Video Visit: {Virtual Visit Platforms:838533::\"Datto\"}  Signed Electronically by: Kendall Garza MD  {Email feedback regarding this note to [email or web address]   :179995}  " abnormality.    L1-L2: Disc height maintained. No herniation. Mild bilateral facet arthropathy. No foraminal or spinal canal stenosis.    L2-L3: Disc height maintained. Minimal bulge. Probable left foraminal annular fissure. Mild bilateral facet arthropathy. No foraminal or spinal canal stenosis.    L3-L4: Mild disc height loss. Disc bulge. Mild bilateral facet arthropathy. No foraminal or spinal canal stenosis.    L4-L5: Severe disc height loss. Disc bulge. Small central protrusion/extrusion. Mild bilateral facet arthropathy. Mild, if any, right foraminal stenosis. Mild left foraminal stenosis. Mild, if any, spinal canal stenosis.    L5-S1: Severe disc height loss. Disc bulge. Mild right and moderate left facet arthropathy. Mild, if any, right foraminal stenosis. Mild left foraminal stenosis. No spinal canal stenosis.    Impression  IMPRESSION: Degenerative change as detailed, worst at L4-L5 and L5-S1.        Video-Visit Details    Type of service:  Video Visit   Originating Location (pt. Location): Home  Distant Location (provider location):  On-site  Platform used for Video Visit: Barrera  Signed Electronically by: Kendall Garza MD

## 2025-06-30 ENCOUNTER — PATIENT OUTREACH (OUTPATIENT)
Dept: CARE COORDINATION | Facility: CLINIC | Age: 50
End: 2025-06-30
Payer: COMMERCIAL

## 2025-07-09 DIAGNOSIS — L70.9 ADULT ACNE: ICD-10-CM

## 2025-07-09 RX ORDER — TAZAROTENE 0.5 MG/G
CREAM TOPICAL AT BEDTIME
Qty: 60 G | Refills: 3 | Status: SHIPPED | OUTPATIENT
Start: 2025-07-09

## 2025-08-21 ENCOUNTER — PATIENT OUTREACH (OUTPATIENT)
Dept: CARE COORDINATION | Facility: CLINIC | Age: 50
End: 2025-08-21
Payer: COMMERCIAL

## 2025-08-21 DIAGNOSIS — G43.009 MIGRAINE WITHOUT AURA AND WITHOUT STATUS MIGRAINOSUS, NOT INTRACTABLE: ICD-10-CM

## 2025-08-21 RX ORDER — SUMATRIPTAN 50 MG/1
TABLET, FILM COATED ORAL
Qty: 9 TABLET | Refills: 10 | Status: SHIPPED | OUTPATIENT
Start: 2025-08-21

## 2025-09-04 ENCOUNTER — PATIENT OUTREACH (OUTPATIENT)
Dept: CARE COORDINATION | Facility: CLINIC | Age: 50
End: 2025-09-04
Payer: COMMERCIAL

## (undated) DEVICE — TUBING SUCTION MEDI-VAC 1/4"X20' N620A

## (undated) DEVICE — GOWN IMPERVIOUS 2XL BLUE

## (undated) DEVICE — KIT ENDO TURNOVER/PROCEDURE CARRY-ON 101822

## (undated) DEVICE — SPECIMEN CONTAINER 3OZ W/FORMALIN 59901

## (undated) DEVICE — SOL WATER IRRIG 500ML BOTTLE 2F7113

## (undated) DEVICE — SUCTION MANIFOLD NEPTUNE 2 SYS 1 PORT 702-025-000